# Patient Record
Sex: FEMALE | Employment: OTHER | URBAN - METROPOLITAN AREA
[De-identification: names, ages, dates, MRNs, and addresses within clinical notes are randomized per-mention and may not be internally consistent; named-entity substitution may affect disease eponyms.]

---

## 2017-09-13 PROBLEM — R56.9 SEIZURE (HCC): Status: ACTIVE | Noted: 2017-09-13

## 2017-10-03 ENCOUNTER — APPOINTMENT (OUTPATIENT)
Dept: LAB | Age: 77
End: 2017-10-03
Attending: INTERNAL MEDICINE

## 2017-10-03 DIAGNOSIS — R56.9 SEIZURE (HCC): ICD-10-CM

## 2017-10-03 PROCEDURE — 80185 ASSAY OF PHENYTOIN TOTAL: CPT

## 2017-10-03 PROCEDURE — 36415 COLL VENOUS BLD VENIPUNCTURE: CPT

## 2017-10-10 ENCOUNTER — APPOINTMENT (OUTPATIENT)
Dept: LAB | Age: 77
End: 2017-10-10
Attending: INTERNAL MEDICINE

## 2017-10-10 DIAGNOSIS — R56.9 SEIZURE (HCC): ICD-10-CM

## 2017-10-10 PROCEDURE — 80185 ASSAY OF PHENYTOIN TOTAL: CPT

## 2017-10-10 PROCEDURE — 36415 COLL VENOUS BLD VENIPUNCTURE: CPT

## 2017-10-17 ENCOUNTER — APPOINTMENT (OUTPATIENT)
Dept: LAB | Age: 77
End: 2017-10-17
Attending: INTERNAL MEDICINE

## 2017-10-17 DIAGNOSIS — R56.9 SEIZURE (HCC): ICD-10-CM

## 2017-10-17 PROCEDURE — 80185 ASSAY OF PHENYTOIN TOTAL: CPT

## 2017-10-17 PROCEDURE — 36415 COLL VENOUS BLD VENIPUNCTURE: CPT

## 2017-10-24 ENCOUNTER — APPOINTMENT (OUTPATIENT)
Dept: LAB | Age: 77
End: 2017-10-24
Attending: INTERNAL MEDICINE

## 2017-10-24 DIAGNOSIS — R56.9 SEIZURE (HCC): ICD-10-CM

## 2017-10-24 PROCEDURE — 36415 COLL VENOUS BLD VENIPUNCTURE: CPT

## 2017-10-24 PROCEDURE — 80185 ASSAY OF PHENYTOIN TOTAL: CPT

## 2017-11-07 ENCOUNTER — APPOINTMENT (OUTPATIENT)
Dept: LAB | Age: 77
End: 2017-11-07
Attending: INTERNAL MEDICINE

## 2017-11-07 DIAGNOSIS — E55.9 VITAMIN D DEFICIENCY: ICD-10-CM

## 2017-11-07 DIAGNOSIS — R56.9 SEIZURE (HCC): ICD-10-CM

## 2017-11-07 PROCEDURE — 36415 COLL VENOUS BLD VENIPUNCTURE: CPT

## 2017-11-07 PROCEDURE — 80185 ASSAY OF PHENYTOIN TOTAL: CPT

## 2017-11-07 PROCEDURE — 82306 VITAMIN D 25 HYDROXY: CPT

## 2017-12-05 ENCOUNTER — APPOINTMENT (OUTPATIENT)
Dept: LAB | Age: 77
End: 2017-12-05
Attending: INTERNAL MEDICINE

## 2017-12-05 DIAGNOSIS — R56.9 SEIZURE (HCC): ICD-10-CM

## 2017-12-05 PROCEDURE — 80185 ASSAY OF PHENYTOIN TOTAL: CPT

## 2017-12-05 PROCEDURE — 36415 COLL VENOUS BLD VENIPUNCTURE: CPT

## 2018-01-02 ENCOUNTER — APPOINTMENT (OUTPATIENT)
Dept: LAB | Age: 78
End: 2018-01-02
Attending: INTERNAL MEDICINE

## 2018-01-02 DIAGNOSIS — R56.9 SEIZURE (HCC): ICD-10-CM

## 2018-01-02 LAB — PHENYTOIN (DILANTIN): 17.3 UG/ML (ref 10–20)

## 2018-01-02 PROCEDURE — 36415 COLL VENOUS BLD VENIPUNCTURE: CPT

## 2018-01-02 PROCEDURE — 80185 ASSAY OF PHENYTOIN TOTAL: CPT

## 2018-01-23 ENCOUNTER — NURSE ONLY (OUTPATIENT)
Dept: LAB | Age: 78
End: 2018-01-23
Attending: INTERNAL MEDICINE

## 2018-01-23 DIAGNOSIS — N18.9 CHRONIC KIDNEY DISEASE, UNSPECIFIED CKD STAGE: ICD-10-CM

## 2018-01-23 DIAGNOSIS — R56.9 SEIZURE (HCC): ICD-10-CM

## 2018-01-23 DIAGNOSIS — I10 HYPERTENSION, UNSPECIFIED TYPE: ICD-10-CM

## 2018-01-23 LAB
BASOPHILS # BLD AUTO: 0.08 X10(3) UL (ref 0–0.1)
BASOPHILS NFR BLD AUTO: 1.1 %
BUN BLD-MCNC: 14 MG/DL (ref 8–20)
CALCIUM BLD-MCNC: 8.8 MG/DL (ref 8.3–10.3)
CHLORIDE: 109 MMOL/L (ref 101–111)
CO2: 26 MMOL/L (ref 22–32)
CREAT BLD-MCNC: 0.71 MG/DL (ref 0.55–1.02)
EOSINOPHIL # BLD AUTO: 0.51 X10(3) UL (ref 0–0.3)
EOSINOPHIL NFR BLD AUTO: 7.1 %
ERYTHROCYTE [DISTWIDTH] IN BLOOD BY AUTOMATED COUNT: 13.3 % (ref 11.5–16)
GLUCOSE BLD-MCNC: 85 MG/DL (ref 70–99)
HCT VFR BLD AUTO: 35.6 % (ref 34–50)
HGB BLD-MCNC: 11.7 G/DL (ref 12–16)
IMMATURE GRANULOCYTE COUNT: 0.02 X10(3) UL (ref 0–1)
IMMATURE GRANULOCYTE RATIO %: 0.3 %
LYMPHOCYTES # BLD AUTO: 2.54 X10(3) UL (ref 0.9–4)
LYMPHOCYTES NFR BLD AUTO: 35.6 %
MCH RBC QN AUTO: 29.3 PG (ref 27–33.2)
MCHC RBC AUTO-ENTMCNC: 32.9 G/DL (ref 31–37)
MCV RBC AUTO: 89 FL (ref 81–100)
MONOCYTES # BLD AUTO: 0.66 X10(3) UL (ref 0.1–0.6)
MONOCYTES NFR BLD AUTO: 9.2 %
NEUTROPHIL ABS PRELIM: 3.33 X10 (3) UL (ref 1.3–6.7)
NEUTROPHILS # BLD AUTO: 3.33 X10(3) UL (ref 1.3–6.7)
NEUTROPHILS NFR BLD AUTO: 46.7 %
PLATELET # BLD AUTO: 180 10(3)UL (ref 150–450)
POTASSIUM SERPL-SCNC: 4 MMOL/L (ref 3.6–5.1)
RBC # BLD AUTO: 4 X10(6)UL (ref 3.8–5.1)
RED CELL DISTRIBUTION WIDTH-SD: 43.1 FL (ref 35.1–46.3)
SODIUM SERPL-SCNC: 143 MMOL/L (ref 136–144)
TSI SER-ACNC: 0.58 MIU/ML (ref 0.35–5.5)
WBC # BLD AUTO: 7.1 X10(3) UL (ref 4–13)

## 2018-01-23 PROCEDURE — 84443 ASSAY THYROID STIM HORMONE: CPT

## 2018-01-23 PROCEDURE — 85025 COMPLETE CBC W/AUTO DIFF WBC: CPT

## 2018-01-23 PROCEDURE — 80048 BASIC METABOLIC PNL TOTAL CA: CPT

## 2018-01-23 PROCEDURE — 36415 COLL VENOUS BLD VENIPUNCTURE: CPT

## 2018-01-30 ENCOUNTER — APPOINTMENT (OUTPATIENT)
Dept: LAB | Age: 78
End: 2018-01-30
Attending: INTERNAL MEDICINE

## 2018-01-30 DIAGNOSIS — Z51.81 ENCOUNTER FOR MEDICATION MONITORING: ICD-10-CM

## 2018-01-30 DIAGNOSIS — R56.9 SEIZURE (HCC): ICD-10-CM

## 2018-01-30 LAB — PHENYTOIN (DILANTIN): 13.7 UG/ML (ref 10–20)

## 2018-01-30 PROCEDURE — 36415 COLL VENOUS BLD VENIPUNCTURE: CPT

## 2018-01-30 PROCEDURE — 80185 ASSAY OF PHENYTOIN TOTAL: CPT

## 2018-03-06 ENCOUNTER — APPOINTMENT (OUTPATIENT)
Dept: LAB | Age: 78
End: 2018-03-06
Attending: INTERNAL MEDICINE

## 2018-03-06 DIAGNOSIS — R56.9 SEIZURE (HCC): ICD-10-CM

## 2018-03-06 PROCEDURE — 80185 ASSAY OF PHENYTOIN TOTAL: CPT

## 2018-03-07 LAB — PHENYTOIN (DILANTIN): 14.8 UG/ML (ref 10–20)

## 2018-04-26 ENCOUNTER — NURSE ONLY (OUTPATIENT)
Dept: LAB | Age: 78
End: 2018-04-26
Attending: INTERNAL MEDICINE

## 2018-04-26 DIAGNOSIS — R56.9 SEIZURE (HCC): ICD-10-CM

## 2018-04-26 PROCEDURE — 80048 BASIC METABOLIC PNL TOTAL CA: CPT

## 2018-04-26 PROCEDURE — 85025 COMPLETE CBC W/AUTO DIFF WBC: CPT

## 2018-04-26 PROCEDURE — 36415 COLL VENOUS BLD VENIPUNCTURE: CPT

## 2018-06-05 ENCOUNTER — APPOINTMENT (OUTPATIENT)
Dept: LAB | Age: 78
End: 2018-06-05
Attending: INTERNAL MEDICINE

## 2018-06-05 DIAGNOSIS — R56.9 SEIZURE (HCC): ICD-10-CM

## 2018-06-05 PROCEDURE — 80185 ASSAY OF PHENYTOIN TOTAL: CPT

## 2018-06-05 PROCEDURE — 36415 COLL VENOUS BLD VENIPUNCTURE: CPT

## 2018-06-19 ENCOUNTER — APPOINTMENT (OUTPATIENT)
Dept: CT IMAGING | Facility: HOSPITAL | Age: 78
DRG: 101 | End: 2018-06-19
Attending: EMERGENCY MEDICINE
Payer: MEDICARE

## 2018-06-19 ENCOUNTER — HOSPITAL ENCOUNTER (INPATIENT)
Facility: HOSPITAL | Age: 78
LOS: 2 days | Discharge: HOME | DRG: 101 | End: 2018-06-21
Attending: EMERGENCY MEDICINE | Admitting: INTERNAL MEDICINE
Payer: MEDICARE

## 2018-06-19 DIAGNOSIS — G40.909 SEIZURE DISORDER (HCC): Primary | ICD-10-CM

## 2018-06-19 DIAGNOSIS — R56.9 SEIZURE (HCC): ICD-10-CM

## 2018-06-19 PROCEDURE — 96376 TX/PRO/DX INJ SAME DRUG ADON: CPT

## 2018-06-19 PROCEDURE — 99285 EMERGENCY DEPT VISIT HI MDM: CPT

## 2018-06-19 PROCEDURE — 85025 COMPLETE CBC W/AUTO DIFF WBC: CPT | Performed by: EMERGENCY MEDICINE

## 2018-06-19 PROCEDURE — 87086 URINE CULTURE/COLONY COUNT: CPT | Performed by: EMERGENCY MEDICINE

## 2018-06-19 PROCEDURE — 70450 CT HEAD/BRAIN W/O DYE: CPT | Performed by: EMERGENCY MEDICINE

## 2018-06-19 PROCEDURE — 87081 CULTURE SCREEN ONLY: CPT | Performed by: INTERNAL MEDICINE

## 2018-06-19 PROCEDURE — 93005 ELECTROCARDIOGRAM TRACING: CPT

## 2018-06-19 PROCEDURE — 96375 TX/PRO/DX INJ NEW DRUG ADDON: CPT

## 2018-06-19 PROCEDURE — 86225 DNA ANTIBODY NATIVE: CPT | Performed by: OTHER

## 2018-06-19 PROCEDURE — 86235 NUCLEAR ANTIGEN ANTIBODY: CPT | Performed by: OTHER

## 2018-06-19 PROCEDURE — 86038 ANTINUCLEAR ANTIBODIES: CPT | Performed by: OTHER

## 2018-06-19 PROCEDURE — 81001 URINALYSIS AUTO W/SCOPE: CPT | Performed by: EMERGENCY MEDICINE

## 2018-06-19 PROCEDURE — 80185 ASSAY OF PHENYTOIN TOTAL: CPT | Performed by: EMERGENCY MEDICINE

## 2018-06-19 PROCEDURE — 96374 THER/PROPH/DIAG INJ IV PUSH: CPT

## 2018-06-19 PROCEDURE — 84484 ASSAY OF TROPONIN QUANT: CPT | Performed by: EMERGENCY MEDICINE

## 2018-06-19 PROCEDURE — 93010 ELECTROCARDIOGRAM REPORT: CPT

## 2018-06-19 PROCEDURE — C9254 INJECTION, LACOSAMIDE: HCPCS | Performed by: EMERGENCY MEDICINE

## 2018-06-19 PROCEDURE — 80053 COMPREHEN METABOLIC PANEL: CPT | Performed by: EMERGENCY MEDICINE

## 2018-06-19 RX ORDER — DIPHENHYDRAMINE HCL 25 MG
25 CAPSULE ORAL EVERY 6 HOURS PRN
Status: DISCONTINUED | OUTPATIENT
Start: 2018-06-19 | End: 2018-06-21

## 2018-06-19 RX ORDER — PHENYTOIN SODIUM 100 MG/1
200 CAPSULE, EXTENDED RELEASE ORAL DAILY
Status: DISCONTINUED | OUTPATIENT
Start: 2018-06-19 | End: 2018-06-20

## 2018-06-19 RX ORDER — ATORVASTATIN CALCIUM 20 MG/1
20 TABLET, FILM COATED ORAL NIGHTLY
Status: DISCONTINUED | OUTPATIENT
Start: 2018-06-19 | End: 2018-06-21

## 2018-06-19 RX ORDER — CETIRIZINE HYDROCHLORIDE 10 MG/1
10 TABLET ORAL DAILY
Status: DISCONTINUED | OUTPATIENT
Start: 2018-06-19 | End: 2018-06-21

## 2018-06-19 RX ORDER — PHENYTOIN SODIUM 100 MG/1
100 CAPSULE, EXTENDED RELEASE ORAL EVERY OTHER DAY
Status: DISCONTINUED | OUTPATIENT
Start: 2018-06-20 | End: 2018-06-20 | Stop reason: SDUPTHER

## 2018-06-19 RX ORDER — CLONAZEPAM 1 MG/1
1 TABLET ORAL 2 TIMES DAILY PRN
Status: DISCONTINUED | OUTPATIENT
Start: 2018-06-19 | End: 2018-06-21

## 2018-06-19 RX ORDER — GABAPENTIN 300 MG/1
300 CAPSULE ORAL 2 TIMES DAILY
Status: DISCONTINUED | OUTPATIENT
Start: 2018-06-19 | End: 2018-06-21

## 2018-06-19 RX ORDER — PHENYTOIN SODIUM 100 MG/1
200 CAPSULE, EXTENDED RELEASE ORAL EVERY OTHER DAY
Status: DISCONTINUED | OUTPATIENT
Start: 2018-06-20 | End: 2018-06-20 | Stop reason: SDUPTHER

## 2018-06-19 RX ORDER — PANTOPRAZOLE SODIUM 40 MG/1
40 TABLET, DELAYED RELEASE ORAL
Status: DISCONTINUED | OUTPATIENT
Start: 2018-06-20 | End: 2018-06-21

## 2018-06-19 RX ORDER — SODIUM CHLORIDE 9 MG/ML
INJECTION, SOLUTION INTRAVENOUS CONTINUOUS
Status: DISCONTINUED | OUTPATIENT
Start: 2018-06-19 | End: 2018-06-21

## 2018-06-19 RX ORDER — PHENYTOIN 50 MG/1
50 TABLET, CHEWABLE ORAL EVERY OTHER DAY
Status: DISCONTINUED | OUTPATIENT
Start: 2018-06-20 | End: 2018-06-20 | Stop reason: SDUPTHER

## 2018-06-19 RX ORDER — ASPIRIN 81 MG/1
81 TABLET, CHEWABLE ORAL DAILY
Status: DISCONTINUED | OUTPATIENT
Start: 2018-06-20 | End: 2018-06-21

## 2018-06-19 RX ORDER — SODIUM CHLORIDE 9 MG/ML
1000 INJECTION, SOLUTION INTRAVENOUS ONCE
Status: COMPLETED | OUTPATIENT
Start: 2018-06-19 | End: 2018-06-19

## 2018-06-19 RX ORDER — LORAZEPAM 2 MG/ML
0.5 INJECTION INTRAMUSCULAR ONCE
Status: COMPLETED | OUTPATIENT
Start: 2018-06-19 | End: 2018-06-19

## 2018-06-19 RX ORDER — BUMETANIDE 1 MG/1
0.5 TABLET ORAL DAILY
Status: DISCONTINUED | OUTPATIENT
Start: 2018-06-19 | End: 2018-06-21

## 2018-06-19 NOTE — ED INITIAL ASSESSMENT (HPI)
Pt aox4. Pt presents to ed from [facility] per EMS. Pt states has had several focal seizures x 2 days. Pt states this is normal for her. Pt c/o generalized weakness, and chest pain after seizure today.

## 2018-06-19 NOTE — ED PROVIDER NOTES
Patient Seen in: [facility] Emergency Department    History   Patient presents with:  Seizure Disorder (neurologic)  Numbness Weakness (neurologic)    Stated Complaint: focal seizure and generalized weakness    HPI    Patient is a 61-year-old female (36.2 °C) (Temporal)   Resp 17   Ht 167.6 cm (5' 6\")   Wt 80 kg   SpO2 97%   BMI 28.47 kg/m²         Physical Exam   Constitutional: She is oriented to person, place, and time. She appears well-developed and well-nourished.    HENT:   Head: Normocephalic a 6/19/2018  CONCLUSION:  No acute intracranial process. Dictated by: Elida Murrieta MD on 6/19/2018 at 19:49     Approved by: Elida Murrieta MD            EKG    Rate, intervals and axes as noted on EKG Report.   Rate: 82  Rhythm: Sinus Rhythm  Reading: No In the room she is sort of looking around, fidgeting and occasionally looking at her left hand. She does have some of her responses, 1-2 words, some are appropriate but some are not. No evidence of facial droop, dysarthria.   As above, symptoms are very

## 2018-06-19 NOTE — ED NOTES
Pt aox4. Pt answering questions appropriately. Pt continuously sighing. Pt states \" everything is hurting me right now. \" Pt unable to describe her complaints. Continuing to monitor pt.  Pt ready for ct scan

## 2018-06-19 NOTE — ED NOTES
Patient began staring, nonverbal, pt following commands, moving all extremities, and reactive to painful stimuli. / Pao Messer notified and at bedside to evaluate pt. Pt placed on monitor and pulse ox.

## 2018-06-20 ENCOUNTER — APPOINTMENT (OUTPATIENT)
Dept: GENERAL RADIOLOGY | Facility: HOSPITAL | Age: 78
DRG: 101 | End: 2018-06-20
Attending: Other
Payer: MEDICARE

## 2018-06-20 PROCEDURE — 83735 ASSAY OF MAGNESIUM: CPT | Performed by: INTERNAL MEDICINE

## 2018-06-20 PROCEDURE — 97161 PT EVAL LOW COMPLEX 20 MIN: CPT

## 2018-06-20 PROCEDURE — 95819 EEG AWAKE AND ASLEEP: CPT

## 2018-06-20 PROCEDURE — 85652 RBC SED RATE AUTOMATED: CPT | Performed by: OTHER

## 2018-06-20 PROCEDURE — 84443 ASSAY THYROID STIM HORMONE: CPT | Performed by: INTERNAL MEDICINE

## 2018-06-20 PROCEDURE — 97165 OT EVAL LOW COMPLEX 30 MIN: CPT

## 2018-06-20 PROCEDURE — 82607 VITAMIN B-12: CPT | Performed by: OTHER

## 2018-06-20 PROCEDURE — 71046 X-RAY EXAM CHEST 2 VIEWS: CPT | Performed by: OTHER

## 2018-06-20 PROCEDURE — 85025 COMPLETE CBC W/AUTO DIFF WBC: CPT | Performed by: INTERNAL MEDICINE

## 2018-06-20 PROCEDURE — 84425 ASSAY OF VITAMIN B-1: CPT | Performed by: OTHER

## 2018-06-20 PROCEDURE — 82746 ASSAY OF FOLIC ACID SERUM: CPT | Performed by: OTHER

## 2018-06-20 RX ORDER — PHENYTOIN 50 MG/1
50 TABLET, CHEWABLE ORAL
Status: DISCONTINUED | OUTPATIENT
Start: 2018-06-21 | End: 2018-06-20

## 2018-06-20 RX ORDER — PHENYTOIN SODIUM 100 MG/1
100 CAPSULE, EXTENDED RELEASE ORAL EVERY OTHER DAY
COMMUNITY
End: 2018-10-15

## 2018-06-20 RX ORDER — PHENYTOIN SODIUM 200 MG/1
400 CAPSULE, EXTENDED RELEASE ORAL EVERY OTHER DAY
Status: ON HOLD | COMMUNITY
End: 2018-06-20

## 2018-06-20 RX ORDER — PHENYTOIN SODIUM 100 MG/1
100 CAPSULE, EXTENDED RELEASE ORAL EVERY OTHER DAY
Status: DISCONTINUED | OUTPATIENT
Start: 2018-06-21 | End: 2018-06-21

## 2018-06-20 RX ORDER — PHENYTOIN SODIUM 100 MG/1
350 CAPSULE, EXTENDED RELEASE ORAL EVERY OTHER DAY
Status: ON HOLD | COMMUNITY
End: 2018-06-20

## 2018-06-20 RX ORDER — PHENYTOIN SODIUM 100 MG/1
400 CAPSULE, EXTENDED RELEASE ORAL
Status: DISCONTINUED | OUTPATIENT
Start: 2018-06-22 | End: 2018-06-20

## 2018-06-20 RX ORDER — LORATADINE 10 MG/1
10 TABLET ORAL DAILY PRN
Status: ON HOLD | COMMUNITY
End: 2018-11-17

## 2018-06-20 RX ORDER — ERGOCALCIFEROL (VITAMIN D2) 1250 MCG
50000 CAPSULE ORAL WEEKLY
COMMUNITY
End: 2019-09-12

## 2018-06-20 RX ORDER — PHENYTOIN 50 MG/1
50 TABLET, CHEWABLE ORAL EVERY OTHER DAY
COMMUNITY
End: 2018-10-15

## 2018-06-20 RX ORDER — PHENYTOIN SODIUM 100 MG/1
200 CAPSULE, EXTENDED RELEASE ORAL EVERY OTHER DAY
Status: DISCONTINUED | OUTPATIENT
Start: 2018-06-22 | End: 2018-06-21

## 2018-06-20 RX ORDER — PHENYTOIN SODIUM 100 MG/1
300 CAPSULE, EXTENDED RELEASE ORAL
Status: DISCONTINUED | OUTPATIENT
Start: 2018-06-21 | End: 2018-06-20

## 2018-06-20 RX ORDER — MULTIVIT-MIN/FOLIC ACID/LUTEIN 500-250MCG
1 TABLET,CHEWABLE ORAL DAILY
COMMUNITY
End: 2019-05-15

## 2018-06-20 RX ORDER — PHENYTOIN SODIUM 200 MG/1
200 CAPSULE, EXTENDED RELEASE ORAL EVERY OTHER DAY
COMMUNITY
End: 2018-10-15

## 2018-06-20 RX ORDER — MELATONIN
1000 DAILY
COMMUNITY
End: 2019-05-15

## 2018-06-20 RX ORDER — PHENYTOIN SODIUM 200 MG/1
200 CAPSULE, EXTENDED RELEASE ORAL NIGHTLY
COMMUNITY
End: 2018-09-04

## 2018-06-20 RX ORDER — PHENYTOIN 50 MG/1
50 TABLET, CHEWABLE ORAL EVERY OTHER DAY
Status: DISCONTINUED | OUTPATIENT
Start: 2018-06-21 | End: 2018-06-21

## 2018-06-20 RX ORDER — PHENYTOIN SODIUM 100 MG/1
200 CAPSULE, EXTENDED RELEASE ORAL NIGHTLY
Status: DISCONTINUED | OUTPATIENT
Start: 2018-06-21 | End: 2018-06-21

## 2018-06-20 RX ORDER — CLONAZEPAM 1 MG/1
1 TABLET ORAL NIGHTLY
COMMUNITY
End: 2019-07-16

## 2018-06-20 NOTE — PLAN OF CARE
Problem: Patient/Family Goals  Goal: Patient/Family Long Term Goal  Patient's Long Term Goal: return to [facility]. Interventions:  -monitor for seizure activity and give medications as ordered.   - See additional Care Plan goals for specific i

## 2018-06-20 NOTE — CM/SW NOTE
Order to discuss home health/post d/c needs with patient. MSW spoke to patient, she has hx of [facility] and declines need for it at this time.

## 2018-06-20 NOTE — OCCUPATIONAL THERAPY NOTE
OCCUPATIONAL THERAPY QUICK EVALUATION - INPATIENT    Room Number: 5487/3110-M  Evaluation Date: 6/20/2018     Type of Evaluation: Quick Eval  Presenting Problem: possible seizure    Physician Order: IP Consult to Occupational Therapy  Reason for Therapy: help from another person does the patient currently need…  -   Putting on and taking off regular lower body clothing?: None   -   Bathing (including washing, rinsing, drying)?: None  -   Toileting, which includes using toilet, bedpan or urinal? : None  - from Occupational Therapy services. Please re-order if a new functional limitation presents during this admission.     Patient was able to achieve the following goals:  Patient able to toilet transfer: safely and independently  Patient able to dress lower

## 2018-06-20 NOTE — PLAN OF CARE
Problem: Patient/Family Goals  Goal: Patient/Family Short Term Goal  Patient's Short Term Goal:no seizure activity    Interventions:   - medications as ordered.    - See additional Care Plan goals for specific interventions   Outcome: Progressing  No seizur

## 2018-06-20 NOTE — H&P
Amanda U. [address]. Patient Status:  Inpatient    1940 MRN [id number]   [facility] 3NE-A Attending Ye Buckner MD   [address] Day # 1 PCP None Pcp     History of Present Illness:  Ayla Gallo is a(n) (two) times daily. Disp:  Rfl:  6/19/2018 at 0800   Levothyroxine Sodium 200 MCG Oral Tab Take 200 mcg by mouth before breakfast. Disp:  Rfl:  6/19/2018 at 0800   metoprolol Tartrate 25 MG Oral Tab Take 25 mg by mouth 2 (two) times daily.  Disp:  Rfl:  6/19 focal neurological deficits. Musculoskeletal: Full range of motion of all extremities. No swelling noted. Integument: No lesions. No erythema. Psychiatric: Appropriate mood and affect.     Laboratory Data:     Lab Results  Component Value Date   WBC 8.1 MD on 6/19/2018 at 19:49     Approved by: Ryland Cloud MD               Assessment & Plan:   Seizure disorder [facility])  On Dilatin , started on Lacosamide by neurology.  Tolerating well  EEG  And MRI of brain pending   CAD - Asymptomatic  Hypothyroidism well

## 2018-06-20 NOTE — CONSULTS
Matt 2  Neurology  Hospital Consultation Report    Nicho Olson Patient Status:  Observation    1940 MRN [id number]   [facility] 3NE-A Attending Rosalita Mohs, MD   Hosp Day # 0 PCP None Pcp   Date of Admission:   history. Family History  History reviewed. No pertinent family history.   Social History  Smoking status: Never Smoker                                                              Smokeless tobacco: Never Used                             Current Medication daily.   Omega-3 1000 MG Oral Cap Take by mouth.   ergocalciferol 01851 units Oral Cap Take by mouth every 7 days. Dexlansoprazole 60 MG Oral Capsule Delayed Release Take 60 mg by mouth daily.    phenytoin 50 MG Oral Chew Tab Chew 1 tablet (50 mg total) b 28.47 kg/m²    General: The patient appears attentive, bright and well composed. Head: Normocephalic, atraumatic. Eyes: anicteric  ENT: normal tongue, normal mucosa.    Neck: supple, normal range of motion, no cervical bruit  Lymph Nodes:  No adenopathy Sanjuanita Bui MD on 6/19/2018 at 19:49     Approved by: Sanjuanita Bui MD            Pertinent Labs and Imaging: Reviewed. Impression:     Seizure disorder (HCC)  Phenytoin and  lacosamide will be continued. EEG testing to follow.   MRI brain looking for

## 2018-06-20 NOTE — ED NOTES
RN called to bedside by pt daughter who states patient is having a seizure. RN witnessed patient looking for items in her bed. Pt not answering questions appropriately and looking around the room. Dr. Lyndon Paulino at bedside, orders received.

## 2018-06-20 NOTE — PROCEDURES
ELECTROENCEPHALOGRAM REPORT      Patient Name: Judi Miller  Chart ID: [id number]  Ordering Physician: Bere Bueno M.D. Date of Test: June 20, 2018  Patient Diagnosis: focal seizures    A portable bedsideEEG was obtained. No sedation was given.

## 2018-06-20 NOTE — CM/SW NOTE
06/20/18 1000   CM/SW Referral Data   Referral Source Social Work (self-referral)   Reason for Referral Discharge planning   Informant Patient   Patient Info   Patient's Mental Status Alert;Oriented   Patient's [address]

## 2018-06-20 NOTE — PROGRESS NOTES
06/20/18 0830   Clinical Encounter Type   Visited With Patient   Routine Visit Introduction   Continue Visiting No   Yarsanism Encounters   Spiritual Requests During Visit / Hospitalization 9[address] Leilani Johnson Patient

## 2018-06-20 NOTE — PHYSICAL THERAPY NOTE
PHYSICAL THERAPY QUICK EVALUATION - INPATIENT    Room Number: 3391/1945-S  Evaluation Date: 6/20/2018  Presenting Problem: seizures  Physician Order: PT Eval and Treat    Problem List  Principal Problem:    Seizure disorder [facility])      Past Medical Histor AM-PAC Score:  Raw Score: 22   PT Approx Degree of Impairment Score: 20.91%   Standardized Score (AM-PAC Scale): 53.28   CMS Modifier (G-Code): CJ      FUNCTIONAL ABILITY STATUS  Gait Assessment  Gait Assistance: Supervision  Distance (ft): 150  Assi

## 2018-06-21 ENCOUNTER — APPOINTMENT (OUTPATIENT)
Dept: MRI IMAGING | Facility: HOSPITAL | Age: 78
DRG: 101 | End: 2018-06-21
Attending: Other
Payer: MEDICARE

## 2018-06-21 VITALS
DIASTOLIC BLOOD PRESSURE: 65 MMHG | WEIGHT: 176.38 LBS | HEART RATE: 70 BPM | HEIGHT: 66 IN | TEMPERATURE: 98 F | OXYGEN SATURATION: 98 % | RESPIRATION RATE: 18 BRPM | BODY MASS INDEX: 28.34 KG/M2 | SYSTOLIC BLOOD PRESSURE: 126 MMHG

## 2018-06-21 PROCEDURE — A9576 INJ PROHANCE MULTIPACK: HCPCS | Performed by: INTERNAL MEDICINE

## 2018-06-21 PROCEDURE — 82962 GLUCOSE BLOOD TEST: CPT

## 2018-06-21 PROCEDURE — 70553 MRI BRAIN STEM W/O & W/DYE: CPT | Performed by: OTHER

## 2018-06-21 RX ORDER — CETIRIZINE HYDROCHLORIDE 10 MG/1
10 TABLET ORAL DAILY
Qty: 30 TABLET | Refills: 0 | Status: SHIPPED | OUTPATIENT
Start: 2018-06-22

## 2018-06-21 NOTE — PROGRESS NOTES
[facility]  Neurology  [facility] Consultation Report    Nicho Olson Patient Status:  Inpatient    1940 MRN [id number]   [facility] 3NE-A Attending Rosalita Mohs, MD   [facility] Day # 2 PCP None Pcp   Date of Admission:   Medications:  Phenytoin Sodium Extended (DILANTIN) ER cap 200 mg 200 mg Oral Nightly   [START ON 6/22/2018] Phenytoin Sodium Extended (DILANTIN) ER cap 200 mg 200 mg Oral QOD   Phenytoin Sodium Extended (DILANTIN) ER cap 100 mg 100 mg Oral QOD   And      p Chewable Tabs). Phenytoin Sodium Extended 100 MG Oral Cap Take 100 mg by mouth every other day. Takes Phenytoin  mg capsule with Phenytoin 50 mg chewable tablet to equal dose of Phenytoin 150 mg every other morning.  Alternating with Phenytoin ER 2 exertion, coughing or wheezing  CARDIOVASCULAR: denies chest pain on exertion; no palpitations  GI: denies abdominal pain, denies heartburn, denies vomiting, constipation,diarrhea,nausea; no rectal bleeding  : no complaint of urinary incontinence  PSYCH: 06/19/2018   CO2 27.0 06/19/2018    (H) 06/19/2018   CA 9.2 06/19/2018   ALB 3.7 06/19/2018   ALKPHO 119 06/19/2018   TP 7.7 06/19/2018   AST 24 06/19/2018   ALT 25 06/19/2018   TSH 0.752 06/20/2018   ESRML 18 06/20/2018   MG 2.3 06/20/2018   TROP < 100 mg by mouth twice a day #60, 3 refills to the patient's pharmacy.   Imani Whitlock MD  6/21/18  15:59

## 2018-06-21 NOTE — PLAN OF CARE
Impaired Functional Mobility    • Achieve highest/safest level of mobility/gait Progressing        NEUROLOGICAL - ADULT    • Absence of seizures Progressing    • Remains free of injury related to seizure activity Progressing        Patient/Family Goals

## 2018-06-21 NOTE — PROGRESS NOTES
Patient discharged to [facility] assist living daughter here to take her home. Explained all medications and follow up appts. Patient and her daughter verbalized there understanding of teaching.

## 2018-07-16 NOTE — DISCHARGE SUMMARY
[facility]  Discharge Summary    Yusuf Perera Patient Status:  Inpatient    1940 MRN [id number]   [facility] 3NE-A Attending No att. providers found   Hosp Day # 2 PCP None Pcp     Date of Admission: 2018    Date of Dischar Historical    !! Phenytoin Sodium Extended 200 MG Oral Cap  Take 200 mg by mouth every other day. Takes Phenytoin  mg every other morning. Alternating with Phenytoin 150mg every other morning (Phenytoin ER 100mg + Phenytoin 50mg Chewable Tabs).  , His Tab          Follow up Visits: Follow-up with Physicians as directed.         [facility]  7/16/2018  6:35 PM

## 2018-08-02 ENCOUNTER — NURSE ONLY (OUTPATIENT)
Dept: LAB | Age: 78
End: 2018-08-02
Attending: INTERNAL MEDICINE
Payer: COMMERCIAL

## 2018-08-02 DIAGNOSIS — R56.9 SEIZURE (HCC): ICD-10-CM

## 2018-08-02 LAB
ALBUMIN SERPL-MCNC: 3.1 G/DL (ref 3.5–4.8)
ALBUMIN/GLOB SERPL: 1 {RATIO} (ref 1–2)
ALP LIVER SERPL-CCNC: 94 U/L (ref 55–142)
ALT SERPL-CCNC: 18 U/L (ref 14–54)
ANION GAP SERPL CALC-SCNC: 5 MMOL/L (ref 0–18)
AST SERPL-CCNC: 15 U/L (ref 15–41)
BASOPHILS # BLD AUTO: 0.08 X10(3) UL (ref 0–0.1)
BASOPHILS NFR BLD AUTO: 1.2 %
BILIRUB SERPL-MCNC: 0.2 MG/DL (ref 0.1–2)
BUN BLD-MCNC: 14 MG/DL (ref 8–20)
BUN/CREAT SERPL: 22.6 (ref 10–20)
CALCIUM BLD-MCNC: 8.4 MG/DL (ref 8.3–10.3)
CHLORIDE SERPL-SCNC: 110 MMOL/L (ref 101–111)
CHOLEST SMN-MCNC: 179 MG/DL (ref ?–200)
CO2 SERPL-SCNC: 28 MMOL/L (ref 22–32)
CREAT BLD-MCNC: 0.62 MG/DL (ref 0.55–1.02)
EOSINOPHIL # BLD AUTO: 0.42 X10(3) UL (ref 0–0.3)
EOSINOPHIL NFR BLD AUTO: 6.5 %
ERYTHROCYTE [DISTWIDTH] IN BLOOD BY AUTOMATED COUNT: 13.6 % (ref 11.5–16)
EST. AVERAGE GLUCOSE BLD GHB EST-MCNC: 123 MG/DL (ref 68–126)
GLOBULIN PLAS-MCNC: 3.2 G/DL (ref 2.5–3.7)
GLUCOSE BLD-MCNC: 83 MG/DL (ref 70–99)
HBA1C MFR BLD HPLC: 5.9 % (ref ?–5.7)
HCT VFR BLD AUTO: 35.3 % (ref 34–50)
HDLC SERPL-MCNC: 87 MG/DL (ref 40–59)
HGB BLD-MCNC: 11.2 G/DL (ref 12–16)
IMMATURE GRANULOCYTE COUNT: 0.02 X10(3) UL (ref 0–1)
IMMATURE GRANULOCYTE RATIO %: 0.3 %
LDLC SERPL CALC-MCNC: 83 MG/DL (ref ?–100)
LYMPHOCYTES # BLD AUTO: 1.75 X10(3) UL (ref 0.9–4)
LYMPHOCYTES NFR BLD AUTO: 27.2 %
M PROTEIN MFR SERPL ELPH: 6.3 G/DL (ref 6.1–8.3)
MCH RBC QN AUTO: 28.5 PG (ref 27–33.2)
MCHC RBC AUTO-ENTMCNC: 31.7 G/DL (ref 31–37)
MCV RBC AUTO: 89.8 FL (ref 81–100)
MONOCYTES # BLD AUTO: 0.57 X10(3) UL (ref 0.1–1)
MONOCYTES NFR BLD AUTO: 8.9 %
NEUTROPHIL ABS PRELIM: 3.59 X10 (3) UL (ref 1.3–6.7)
NEUTROPHILS # BLD AUTO: 3.59 X10(3) UL (ref 1.3–6.7)
NEUTROPHILS NFR BLD AUTO: 55.9 %
NONHDLC SERPL-MCNC: 92 MG/DL (ref ?–130)
OSMOLALITY SERPL CALC.SUM OF ELEC: 296 MOSM/KG (ref 275–295)
PHENYTOIN (DILANTIN): 18.6 UG/ML (ref 10–20)
PLATELET # BLD AUTO: 154 10(3)UL (ref 150–450)
POTASSIUM SERPL-SCNC: 4.2 MMOL/L (ref 3.6–5.1)
RBC # BLD AUTO: 3.93 X10(6)UL (ref 3.8–5.1)
RED CELL DISTRIBUTION WIDTH-SD: 45.4 FL (ref 35.1–46.3)
SODIUM SERPL-SCNC: 143 MMOL/L (ref 136–144)
TRIGL SERPL-MCNC: 45 MG/DL (ref 30–149)
TSI SER-ACNC: 1.6 MIU/ML (ref 0.35–5.5)
VIT D+METAB SERPL-MCNC: 52.7 NG/ML (ref 30–100)
VLDLC SERPL CALC-MCNC: 9 MG/DL (ref 0–30)
WBC # BLD AUTO: 6.4 X10(3) UL (ref 4–13)

## 2018-08-02 PROCEDURE — 80061 LIPID PANEL: CPT

## 2018-08-02 PROCEDURE — 36415 COLL VENOUS BLD VENIPUNCTURE: CPT

## 2018-08-02 PROCEDURE — 80185 ASSAY OF PHENYTOIN TOTAL: CPT

## 2018-08-02 PROCEDURE — 82306 VITAMIN D 25 HYDROXY: CPT

## 2018-08-02 PROCEDURE — 84443 ASSAY THYROID STIM HORMONE: CPT

## 2018-08-02 PROCEDURE — 83036 HEMOGLOBIN GLYCOSYLATED A1C: CPT

## 2018-08-02 PROCEDURE — 85025 COMPLETE CBC W/AUTO DIFF WBC: CPT

## 2018-08-02 PROCEDURE — 80053 COMPREHEN METABOLIC PANEL: CPT

## 2018-09-08 ENCOUNTER — APPOINTMENT (OUTPATIENT)
Dept: GENERAL RADIOLOGY | Facility: HOSPITAL | Age: 78
End: 2018-09-08
Attending: EMERGENCY MEDICINE
Payer: MEDICARE

## 2018-09-08 ENCOUNTER — HOSPITAL ENCOUNTER (EMERGENCY)
Facility: HOSPITAL | Age: 78
Discharge: HOME | End: 2018-09-08
Attending: EMERGENCY MEDICINE
Payer: MEDICARE

## 2018-09-08 ENCOUNTER — APPOINTMENT (OUTPATIENT)
Dept: CT IMAGING | Facility: HOSPITAL | Age: 78
End: 2018-09-08
Attending: EMERGENCY MEDICINE
Payer: MEDICARE

## 2018-09-08 VITALS
SYSTOLIC BLOOD PRESSURE: 168 MMHG | RESPIRATION RATE: 16 BRPM | HEIGHT: 62 IN | DIASTOLIC BLOOD PRESSURE: 80 MMHG | OXYGEN SATURATION: 100 % | WEIGHT: 160 LBS | BODY MASS INDEX: 29.44 KG/M2 | TEMPERATURE: 98 F | HEART RATE: 70 BPM

## 2018-09-08 DIAGNOSIS — S20.212A CONTUSION OF RIB ON LEFT SIDE, INITIAL ENCOUNTER: ICD-10-CM

## 2018-09-08 DIAGNOSIS — S09.90XA INJURY OF HEAD, INITIAL ENCOUNTER: Primary | ICD-10-CM

## 2018-09-08 DIAGNOSIS — S80.02XA CONTUSION OF LEFT KNEE, INITIAL ENCOUNTER: ICD-10-CM

## 2018-09-08 PROCEDURE — 73562 X-RAY EXAM OF KNEE 3: CPT | Performed by: EMERGENCY MEDICINE

## 2018-09-08 PROCEDURE — 99285 EMERGENCY DEPT VISIT HI MDM: CPT

## 2018-09-08 PROCEDURE — 99284 EMERGENCY DEPT VISIT MOD MDM: CPT

## 2018-09-08 PROCEDURE — 71045 X-RAY EXAM CHEST 1 VIEW: CPT | Performed by: EMERGENCY MEDICINE

## 2018-09-08 PROCEDURE — 70450 CT HEAD/BRAIN W/O DYE: CPT | Performed by: EMERGENCY MEDICINE

## 2018-09-08 NOTE — ED PROVIDER NOTES
Patient Seen in: [facility] Emergency Department    History   Patient presents with:  Fall (musculoskeletal, neurologic)    Stated Complaint: fall    HPI    Patient is a 61-year-old female comes emergency room for evaluation of a fall.   Patient appa Normocephalic.  small right temporal contusion with ecchymosis per moist mucous membranes. Pupils equal round reactive to light accommodation, extraocular motion is intact, sclerae white, conjunctiva is pink.   Oropharynx is unremarkable, no exudate, denti forward. She hit her head on the right side and landed onto carpet  no LOC. FINDINGS:  No evidence of acute displaced fracture or dislocation. Normal mineralization.   Changes of previous left total knee arthroplasty without evidence of hardware complic radiograph was obtained. COMPARISON:  [facility] , XR CHEST PA + LAT CHEST (CPT=71046), 6/20/2018, 8:52.   INDICATIONS:  fall  PATIENT STATED HISTORY: (As transcribed by Technologist)  Fall  Patient fell Thursday while getting out of the shower her slippers go Impression:  Injury of head, initial encounter  (primary encounter diagnosis)  Contusion of left knee, initial encounter  Contusion of rib on left side, initial encounter    Disposition:  Discharge  9/8/2018  3:33 pm    Follow-up:  Yahaira Goodman MD  [phone]

## 2018-09-08 NOTE — ED INITIAL ASSESSMENT (HPI)
Patient fell Thursday while getting out of the shower her slippers got in the way  She hit her left knee. She was not seen for that fall Today  She was trying to take her stockings and shoes off she lost her balance and went forward.   She hit her head on t

## 2018-10-25 PROBLEM — I10 ESSENTIAL HYPERTENSION: Status: ACTIVE | Noted: 2018-10-25

## 2018-10-25 PROBLEM — E03.9 HYPOTHYROIDISM: Status: ACTIVE | Noted: 2018-10-25

## 2018-10-25 PROBLEM — E78.2 MIXED HYPERLIPIDEMIA: Status: ACTIVE | Noted: 2018-10-25

## 2018-11-15 ENCOUNTER — HOSPITAL ENCOUNTER (INPATIENT)
Facility: HOSPITAL | Age: 78
LOS: 1 days | Discharge: HOME HEALTH | DRG: 101 | End: 2018-11-17
Attending: EMERGENCY MEDICINE | Admitting: INTERNAL MEDICINE
Payer: MEDICARE

## 2018-11-15 ENCOUNTER — APPOINTMENT (OUTPATIENT)
Dept: GENERAL RADIOLOGY | Facility: HOSPITAL | Age: 78
DRG: 101 | End: 2018-11-15
Attending: EMERGENCY MEDICINE
Payer: MEDICARE

## 2018-11-15 ENCOUNTER — APPOINTMENT (OUTPATIENT)
Dept: CT IMAGING | Facility: HOSPITAL | Age: 78
DRG: 101 | End: 2018-11-15
Attending: EMERGENCY MEDICINE
Payer: MEDICARE

## 2018-11-15 DIAGNOSIS — R56.9 CONVULSIONS, UNSPECIFIED CONVULSION TYPE (HCC): Primary | ICD-10-CM

## 2018-11-15 DIAGNOSIS — R56.9 SEIZURE (HCC): ICD-10-CM

## 2018-11-15 LAB
ALBUMIN SERPL-MCNC: 3.9 G/DL (ref 3.1–4.5)
ALBUMIN/GLOB SERPL: 0.9 {RATIO} (ref 1–2)
ALP LIVER SERPL-CCNC: 126 U/L (ref 55–142)
ALT SERPL-CCNC: 26 U/L (ref 14–54)
ANION GAP SERPL CALC-SCNC: 13 MMOL/L (ref 0–18)
APTT PPP: 29.1 SECONDS (ref 26.1–34.6)
AST SERPL-CCNC: 24 U/L (ref 15–41)
BASOPHILS # BLD AUTO: 0.06 X10(3) UL (ref 0–0.1)
BASOPHILS NFR BLD AUTO: 0.6 %
BILIRUB SERPL-MCNC: 0.2 MG/DL (ref 0.1–2)
BUN BLD-MCNC: 16 MG/DL (ref 8–20)
BUN/CREAT SERPL: 17.6 (ref 10–20)
CALCIUM BLD-MCNC: 9.1 MG/DL (ref 8.3–10.3)
CHLORIDE SERPL-SCNC: 103 MMOL/L (ref 101–111)
CO2 SERPL-SCNC: 22 MMOL/L (ref 22–32)
CREAT BLD-MCNC: 0.91 MG/DL (ref 0.55–1.02)
EOSINOPHIL # BLD AUTO: 0.17 X10(3) UL (ref 0–0.3)
EOSINOPHIL NFR BLD AUTO: 1.7 %
ERYTHROCYTE [DISTWIDTH] IN BLOOD BY AUTOMATED COUNT: 13.9 % (ref 11.5–16)
GLOBULIN PLAS-MCNC: 4.2 G/DL (ref 2.8–4.4)
GLUCOSE BLD-MCNC: 93 MG/DL (ref 65–99)
GLUCOSE BLD-MCNC: 94 MG/DL (ref 70–99)
HCT VFR BLD AUTO: 39.9 % (ref 34–50)
HGB BLD-MCNC: 13.7 G/DL (ref 12–16)
IMMATURE GRANULOCYTE COUNT: 0.05 X10(3) UL (ref 0–1)
IMMATURE GRANULOCYTE RATIO %: 0.5 %
INR BLD: 0.99 (ref 0.9–1.1)
LYMPHOCYTES # BLD AUTO: 2.05 X10(3) UL (ref 0.9–4)
LYMPHOCYTES NFR BLD AUTO: 20.5 %
M PROTEIN MFR SERPL ELPH: 8.1 G/DL (ref 6.4–8.2)
MCH RBC QN AUTO: 29.2 PG (ref 27–33.2)
MCHC RBC AUTO-ENTMCNC: 34.3 G/DL (ref 31–37)
MCV RBC AUTO: 85.1 FL (ref 81–100)
MONOCYTES # BLD AUTO: 0.82 X10(3) UL (ref 0.1–1)
MONOCYTES NFR BLD AUTO: 8.2 %
NEUTROPHIL ABS PRELIM: 6.87 X10 (3) UL (ref 1.3–6.7)
NEUTROPHILS # BLD AUTO: 6.87 X10(3) UL (ref 1.3–6.7)
NEUTROPHILS NFR BLD AUTO: 68.5 %
OSMOLALITY SERPL CALC.SUM OF ELEC: 287 MOSM/KG (ref 275–295)
PHENYTOIN (DILANTIN): 24.9 UG/ML (ref 10–20)
PLATELET # BLD AUTO: 217 10(3)UL (ref 150–450)
POTASSIUM SERPL-SCNC: 3.9 MMOL/L (ref 3.6–5.1)
PSA SERPL DL<=0.01 NG/ML-MCNC: 13.5 SECONDS (ref 12.4–14.7)
RBC # BLD AUTO: 4.69 X10(6)UL (ref 3.8–5.1)
RED CELL DISTRIBUTION WIDTH-SD: 43 FL (ref 35.1–46.3)
SODIUM SERPL-SCNC: 138 MMOL/L (ref 136–144)
TROPONIN I SERPL-MCNC: <0.046 NG/ML (ref ?–0.05)
WBC # BLD AUTO: 10 X10(3) UL (ref 4–13)

## 2018-11-15 PROCEDURE — 71045 X-RAY EXAM CHEST 1 VIEW: CPT | Performed by: EMERGENCY MEDICINE

## 2018-11-15 PROCEDURE — 70450 CT HEAD/BRAIN W/O DYE: CPT | Performed by: EMERGENCY MEDICINE

## 2018-11-15 RX ORDER — LORAZEPAM 2 MG/ML
INJECTION INTRAMUSCULAR
Status: COMPLETED
Start: 2018-11-15 | End: 2018-11-15

## 2018-11-15 RX ORDER — LORAZEPAM 2 MG/ML
0.5 INJECTION INTRAMUSCULAR ONCE
Status: COMPLETED | OUTPATIENT
Start: 2018-11-15 | End: 2018-11-15

## 2018-11-16 ENCOUNTER — HOSPITAL ENCOUNTER (EMERGENCY)
Facility: HOSPITAL | Age: 78
Discharge: STILL ADMITTED OR ADMINISTRATIVE | End: 2018-11-17
Payer: COMMERCIAL

## 2018-11-16 PROBLEM — R56.9 CONVULSIONS, UNSPECIFIED CONVULSION TYPE (HCC): Status: ACTIVE | Noted: 2018-11-16

## 2018-11-16 LAB
ANION GAP SERPL CALC-SCNC: 7 MMOL/L (ref 0–18)
ATRIAL RATE: 94 BPM
BASOPHILS # BLD AUTO: 0.06 X10(3) UL (ref 0–0.1)
BASOPHILS NFR BLD AUTO: 0.7 %
BUN BLD-MCNC: 13 MG/DL (ref 8–20)
BUN/CREAT SERPL: 17.8 (ref 10–20)
CALCIUM BLD-MCNC: 8.3 MG/DL (ref 8.3–10.3)
CHLORIDE SERPL-SCNC: 107 MMOL/L (ref 101–111)
CO2 SERPL-SCNC: 23 MMOL/L (ref 22–32)
CREAT BLD-MCNC: 0.73 MG/DL (ref 0.55–1.02)
EOSINOPHIL # BLD AUTO: 0.18 X10(3) UL (ref 0–0.3)
EOSINOPHIL NFR BLD AUTO: 2.1 %
ERYTHROCYTE [DISTWIDTH] IN BLOOD BY AUTOMATED COUNT: 14.3 % (ref 11.5–16)
GLUCOSE BLD-MCNC: 116 MG/DL (ref 70–99)
HCT VFR BLD AUTO: 36.4 % (ref 34–50)
HGB BLD-MCNC: 12.4 G/DL (ref 12–16)
IMMATURE GRANULOCYTE COUNT: 0.02 X10(3) UL (ref 0–1)
IMMATURE GRANULOCYTE RATIO %: 0.2 %
LYMPHOCYTES # BLD AUTO: 1.79 X10(3) UL (ref 0.9–4)
LYMPHOCYTES NFR BLD AUTO: 20.4 %
MCH RBC QN AUTO: 29.2 PG (ref 27–33.2)
MCHC RBC AUTO-ENTMCNC: 34.1 G/DL (ref 31–37)
MCV RBC AUTO: 85.6 FL (ref 81–100)
MONOCYTES # BLD AUTO: 0.7 X10(3) UL (ref 0.1–1)
MONOCYTES NFR BLD AUTO: 8 %
NEUTROPHIL ABS PRELIM: 6.01 X10 (3) UL (ref 1.3–6.7)
NEUTROPHILS # BLD AUTO: 6.01 X10(3) UL (ref 1.3–6.7)
NEUTROPHILS NFR BLD AUTO: 68.6 %
OSMOLALITY SERPL CALC.SUM OF ELEC: 285 MOSM/KG (ref 275–295)
P AXIS: 63 DEGREES
P-R INTERVAL: 160 MS
PLATELET # BLD AUTO: 197 10(3)UL (ref 150–450)
POTASSIUM SERPL-SCNC: 3.4 MMOL/L (ref 3.6–5.1)
Q-T INTERVAL: 368 MS
QRS DURATION: 84 MS
QTC CALCULATION (BEZET): 460 MS
R AXIS: -3 DEGREES
RBC # BLD AUTO: 4.25 X10(6)UL (ref 3.8–5.1)
RED CELL DISTRIBUTION WIDTH-SD: 43.9 FL (ref 35.1–46.3)
SODIUM SERPL-SCNC: 137 MMOL/L (ref 136–144)
T AXIS: 51 DEGREES
T4 FREE SERPL-MCNC: 1 NG/DL (ref 0.9–1.8)
TSI SER-ACNC: 6.08 MIU/ML (ref 0.35–5.5)
VENTRICULAR RATE: 94 BPM
WBC # BLD AUTO: 8.8 X10(3) UL (ref 4–13)

## 2018-11-16 PROCEDURE — 99223 1ST HOSP IP/OBS HIGH 75: CPT | Performed by: NURSE PRACTITIONER

## 2018-11-16 RX ORDER — LORAZEPAM 2 MG/ML
1 INJECTION INTRAMUSCULAR EVERY 4 HOURS PRN
Status: DISCONTINUED | OUTPATIENT
Start: 2018-11-16 | End: 2018-11-16

## 2018-11-16 RX ORDER — FLUTICASONE PROPIONATE 50 MCG
1 SPRAY, SUSPENSION (ML) NASAL EVERY 12 HOURS
Status: DISCONTINUED | OUTPATIENT
Start: 2018-11-16 | End: 2018-11-17

## 2018-11-16 RX ORDER — HEPARIN SODIUM 5000 [USP'U]/ML
5000 INJECTION, SOLUTION INTRAVENOUS; SUBCUTANEOUS EVERY 8 HOURS SCHEDULED
Status: DISCONTINUED | OUTPATIENT
Start: 2018-11-16 | End: 2018-11-17

## 2018-11-16 RX ORDER — PANTOPRAZOLE SODIUM 40 MG/1
40 TABLET, DELAYED RELEASE ORAL
Status: DISCONTINUED | OUTPATIENT
Start: 2018-11-16 | End: 2018-11-17

## 2018-11-16 RX ORDER — LEVOTHYROXINE SODIUM 0.2 MG/1
200 TABLET ORAL
Status: DISCONTINUED | OUTPATIENT
Start: 2018-11-16 | End: 2018-11-17

## 2018-11-16 RX ORDER — SODIUM CHLORIDE 9 MG/ML
INJECTION, SOLUTION INTRAVENOUS CONTINUOUS
Status: DISCONTINUED | OUTPATIENT
Start: 2018-11-16 | End: 2018-11-17

## 2018-11-16 RX ORDER — POTASSIUM CHLORIDE 20 MEQ/1
40 TABLET, EXTENDED RELEASE ORAL EVERY 4 HOURS
Status: COMPLETED | OUTPATIENT
Start: 2018-11-16 | End: 2018-11-16

## 2018-11-16 RX ORDER — PHENYTOIN SODIUM 100 MG/1
400 CAPSULE, EXTENDED RELEASE ORAL
Status: DISCONTINUED | OUTPATIENT
Start: 2018-11-17 | End: 2018-11-16

## 2018-11-16 RX ORDER — ASPIRIN 81 MG/1
81 TABLET, CHEWABLE ORAL DAILY
Status: DISCONTINUED | OUTPATIENT
Start: 2018-11-16 | End: 2018-11-17

## 2018-11-16 RX ORDER — ECHINACEA PURPUREA EXTRACT 125 MG
1 TABLET ORAL
Status: DISCONTINUED | OUTPATIENT
Start: 2018-11-16 | End: 2018-11-17

## 2018-11-16 RX ORDER — ATORVASTATIN CALCIUM 20 MG/1
20 TABLET, FILM COATED ORAL NIGHTLY
Status: DISCONTINUED | OUTPATIENT
Start: 2018-11-16 | End: 2018-11-17

## 2018-11-16 RX ORDER — ONDANSETRON 2 MG/ML
4 INJECTION INTRAMUSCULAR; INTRAVENOUS EVERY 4 HOURS PRN
Status: DISCONTINUED | OUTPATIENT
Start: 2018-11-16 | End: 2018-11-17

## 2018-11-16 RX ORDER — GABAPENTIN 300 MG/1
300 CAPSULE ORAL 2 TIMES DAILY
Status: DISCONTINUED | OUTPATIENT
Start: 2018-11-16 | End: 2018-11-17

## 2018-11-16 RX ORDER — SODIUM CHLORIDE 9 MG/ML
INJECTION, SOLUTION INTRAVENOUS CONTINUOUS
Status: DISCONTINUED | OUTPATIENT
Start: 2018-11-16 | End: 2018-11-16

## 2018-11-16 RX ORDER — METOCLOPRAMIDE HYDROCHLORIDE 5 MG/ML
10 INJECTION INTRAMUSCULAR; INTRAVENOUS EVERY 8 HOURS PRN
Status: DISCONTINUED | OUTPATIENT
Start: 2018-11-16 | End: 2018-11-17

## 2018-11-16 RX ORDER — BUMETANIDE 1 MG/1
0.5 TABLET ORAL DAILY
Status: DISCONTINUED | OUTPATIENT
Start: 2018-11-16 | End: 2018-11-17

## 2018-11-16 RX ORDER — LORAZEPAM 2 MG/ML
1 INJECTION INTRAMUSCULAR
Status: DISCONTINUED | OUTPATIENT
Start: 2018-11-16 | End: 2018-11-17

## 2018-11-16 RX ORDER — ACETAMINOPHEN 325 MG/1
650 TABLET ORAL EVERY 6 HOURS PRN
Status: DISCONTINUED | OUTPATIENT
Start: 2018-11-16 | End: 2018-11-17

## 2018-11-16 NOTE — ED NOTES
RN spoke with patients daughter, [facility]. Notified daughter that patient will be admitted to the hospital due to having a seizure here in the emergency room. All questions/concerns addressed at this time.

## 2018-11-16 NOTE — PROGRESS NOTES
11/16/18 [address] [place] [address] of [facility]-Anointing Visiting  anointed patient   The patient was seen by Keith Capone. Received prayer, Scripture, support and Sacrament of the Sick.   remain available at the pag

## 2018-11-16 NOTE — PROGRESS NOTES
ICU  Critical Care APRN H & P    NAME: Letty Duncan - ROOM: [address] [place] [address] - MRN: [id number] - Age: 70year old - :1940    History Of Present Illness:  Letty Duncan is a 70year old female with PMHx significant for seizures, CAD s/p stents, hypothyr bruits  Lungs: Clear to auscultation bilaterally, respirations unlabored  Heart: Regular rate and rhythm, S1 and S2 normal, no murmur, rub or gallop  Abdomen: Soft, non-tender, bowel sounds active all four quadrants, no masses, no organomegaly  Extremities chronic small vessel ischemic disease is noted. No evidence of intracranial hemorrhage or extra-axial fluid collection.    Dictated by: Jenny Cervantes MD on 11/15/2018 at 21:33     Approved by: Jenny Cervantes MD            Xr Chest Ap Portable  (cpt ASA, BB    F/E/N:    - Cardiac diet as tolerated  - NS @ 75mL/hr  - Electrolyte replacement per protcol.      Proph:  -SQ heparin  -SCD    Dispo:   -Full code      Assessment and plan discussed with Siobhan Courser, [address] [place] [facility]

## 2018-11-16 NOTE — CONSULTS
Matt 2  Neurology  Hospital Consultation Report    Jerson Alanis Patient Status:  Inpatient    1940 MRN [id number]   [facility] 4SW-A Attending Jaron Gill MD   Hosp Day # 0 PCP None Pcp   Date of Admission:  11/15/20 Smoker      Smokeless tobacco: Never Used    Alcohol use: Not on file    Drug use: Not on file         Current Medications:    Current Facility-Administered Medications:  ondansetron HCl (ZOFRAN) injection 4 mg 4 mg Intravenous Q4H PRN   LORazepam (ATIVAN) (10 mg total) by mouth daily. Vitamin D3 1000 units Oral Tab Take 1,000 Units by mouth daily. loratadine 10 MG Oral Tab Take 10 mg by mouth daily as needed for Allergies.    Calcium Carbonate-Vit D-Min (CALCIUM 600+D PLUS MINERALS) 600-400 MG-UNIT Oral anxiety  HEMATOLOGIC: no reports of excessive bruising or bleeding  ENDOCRINE: blood sugars under good control  MUSCULOSKELETAL: no joint pain, redness, swelling  NEURO: as above    Physical Exam:   Physical Exam:  /78 (BP Location: Left arm)   Pulse 11/15/2018    PTT 29.1 11/15/2018    INR 0.99 11/15/2018    PTP 13.5 11/15/2018    T4F 1.0 11/16/2018    TSH 6.080 (H) 11/16/2018    ESRML 18 06/20/2018    MG 2.3 06/20/2018    TROP <0.046 11/15/2018    B12 475 06/20/2018       Imaging:  Ct Brain Or Head ( radiograph was obtained. COMPARISON:  [facility] , XR CHEST AP PORTABLE  (CPT=71045), 9/08/2018, 13:50.   INDICATIONS:  tingling  PATIENT STATED HISTORY: (As transcribed by Technologist)  Tingling down left arm, anxiety    FINDINGS: Cardiac silhouette and pulm

## 2018-11-16 NOTE — ED NOTES
At 2200 RN rounding on patient, noticed patients HR elevated to 120's and patient was tachyonic and not responding to verbal stimuli. MD to bedside, orders received for ativan, accucheck obtained. Patient more arousable post ativan administration.  Episode

## 2018-11-16 NOTE — ED PROVIDER NOTES
Patient Seen in: [facility] Emergency Department    History   Patient presents with:  Numbness Weakness (neurologic)    Stated Complaint: tingling     HPI    The patient is a 16-year-old female who presents emergency room with a history of seizures Current:/83   Pulse 98   Temp 98.3 °F (36.8 °C) (Temporal)   Resp 18   Ht 157.5 cm (5' 2\")   Wt 77.1 kg   SpO2 99%   BMI 31.09 kg/m²         Physical Exam  GENERAL: Well-developed, well-nourished female sitting up breathing easily in no appare Normal   PTT, ACTIVATED - Normal   POCT GLUCOSE - Normal   CBC WITH DIFFERENTIAL WITH PLATELET    Narrative: The following orders were created for panel order CBC WITH DIFFERENTIAL WITH PLATELET.   Procedure                               Abnormality in the left supra clinoid internal carotid artery extending into the left M1 segment is again noted. No significant changes since prior exam.  Sequelae of chronic small vessel ischemic disease is noted.  No evidence of intracranial hemorrhage or extra-axia and blood sugar all of which are unremarkable. Patient liver function tests troponin are found to be negative. Patient's coags are unremarkable. Patient's Dilantin level was mildly elevated at 24.9.   Patient was placed on a continuous pulse ox and cardi and tachycardia due to her underlying seizure disorder. This involved direct patient intervention, complex decision making, and/or extensive discussions with the patient, family, and clinical staff.         Disposition and Plan     Clinical Impression:  Co

## 2018-11-16 NOTE — ED INITIAL ASSESSMENT (HPI)
Patient with complaints of tingling radiating down to the left arm with intermittent shortness of breath that has been going on for two weeks. Was seen two weeks ago and had medication changes and patient states symptoms have not improved.

## 2018-11-16 NOTE — ED NOTES
Report given to [address]. Patient updated with plan of care, no questions at this time. Patient transported with RN and monitor.

## 2018-11-16 NOTE — CONSULTS
Pulmonary H&P/Consult       NAME: Pelon Haque - ROOM: [address] [place] [address]- - MRN: [id number] - Age: 70year old - :  1940    Date of Admission: 11/15/2018  8:02 PM  Admission Diagnosis: Convulsions, unspecified convulsion type ([facility] [R56.9]  Reason for cons Rfl: 6 11/15/2018 at Unknown time   PHENYTOIN SODIUM EXTENDED 200 MG Oral Cap TAKE 1 CAPSULE BY MOUTH NIGHTLY.  ALTERNATE BETWEEN 150 MG  MG EVERY OTHER DAY AS DIRECTED Disp: 30 capsule Rfl: 0 Past Week at Unknown time   LEVOTHYROXINE SODIUM 200 MCG mg by mouth every 6 (six) hours as needed for Itching. Disp:  Rfl:  Past Week at Unknown time   atorvastatin (LIPITOR) 20 MG Oral Tab Take 1 tablet (20 mg total) by mouth nightly.  Disp: 90 tablet Rfl: 0 Unknown at Unknown time   ergocalciferol (DRISDOL) 50 ALTERNATE BETWEEN 150 MG  MG EVERY OTHER DAY AS DIRECTED Disp: 30 capsule Rfl: 0   LEVOTHYROXINE SODIUM 200 MCG Oral Tab TAKE 1 TABLET BY MOUTH AT 6:30AM BEFORE BREAKFAST Disp: 90 tablet Rfl: 0   GABAPENTIN 300 MG Oral Cap TAKE ONE CAPSULE BY MOUTH metoprolol Tartrate  25 mg Oral 2x Daily(Beta Blocker)   • Phenytoin Sodium Extended  350 mg Oral Once per day on Sun Mon Wed Fri   • [START ON 11/17/2018] Phenytoin Sodium Extended  400 mg Oral Once per day on Tue Thu Sat     Continuous Infusing Medicatio PERRL, conjunctiva/corneas clear, EOM's intact, both eyes   Throat:   Lips, mucosa, and tongue normal; teeth and gums normal   Neck:   Supple, symmetrical, trachea midline, no adenopathy;        thyroid:  No enlargement/tenderness/nodules; no carotid    br 05:48 AM 0.752 0.350 - 5.500 mIU/mL Final     Albumin   Date/Time Value Ref Range Status   11/15/2018 08:23 PM 3.9 3.1 - 4.5 g/dL Final   09/13/2017 03:22 PM 3.9 3.5 - 4.8 g/dL Final       Imaging: chest x-ray reviewed- clear lung fields eml    ASSESSMENT/

## 2018-11-16 NOTE — PLAN OF CARE
METABOLIC/FLUID AND ELECTROLYTES - ADULT    • Hemodynamic stability and optimal renal function maintained Progressing        NEUROLOGICAL - ADULT    • Absence of seizures Progressing    • Remains free of injury related to seizure activity Progressing

## 2018-11-16 NOTE — H&P
Amanda U. 96. Patient Status:  Inpatient    1940 MRN [id number]   [facility] 4SW-A Attending Kevin Mcneal MD   Hosp Day # 0 PCP None Pcp     History of Present Illness:  Anali Hines is a(n) 1 TABLET BY MOUTH AT 6:30AM BEFORE BREAKFAST Disp: 90 tablet Rfl: 0 11/15/2018 at Unknown time   GABAPENTIN 300 MG Oral Cap TAKE ONE CAPSULE BY MOUTH TWICE DAILY Disp: 180 capsule Rfl: 0 11/15/2018 at Unknown time   Lacosamide 100 MG Oral Tab Take 1 tablet Cap Take 50,000 Units by mouth once a week. Disp:  Rfl:  Unknown at Unknown time   hydrocortisone 2.5 % External Cream Apply topically 2 (two) times daily as needed.  For itching/rash  Disp:  Rfl:  Unknown at Unknown time       Review of Systems:  A compreh 9/08/2018, 14:35. INDICATIONS:  tingling  TECHNIQUE:  Noncontrast CT scanning is performed through the brain. Dose reduction techniques were used.  Dose information is transmitted to the ACR ([address] [place] [address] of Radiology) [address] [facility] Radiology Data Reg radiograph.      Dictated by: Paulette Trent MD on 11/15/2018 at 21:01     Approved by: Paulette Trent MD               Assessment & Plan:    Recurrent Seizure , already on multiple medications for Seizure  Await neurology consult    CAD- stable  HT

## 2018-11-16 NOTE — PAYOR COMM NOTE
--------------  ADMISSION REVIEW       11/16  ED    Numbness Weakness      Stated Complaint: tingling      HPI     The patient is a 61-year-old female who presents emergency room with a history of seizures that she has had over the last 7 or 8 years as per all 4 extremities. There are no gross motor or sensory deficits appreciated. Cranial nerves II through XII are intact.   Patient is answering all questions appropriately.             HX  SEIZURE  HTN    VS  T 99.5  P 103  R 7  & 28  172/120  SATS 91      La AWAITING ADMISSION TO ICU AT THIS TIME.      The patient had IV line established and blood work done including a CBC, chemistries, BUN and creatinine, and blood sugar all of which are unremarkable.   Patient liver function tests troponin are found to be neg time.            ED COURSE      Clinical Impression:  Convulsions  DYSPNEA    TO ICU     ATIVAN IV X 2  IVF 75  ORAL DILANTIN

## 2018-11-16 NOTE — PROCEDURES
ELECTROENCEPHALOGRAM REPORT      Patient Name: Siri Rubin  Chart ID: [id number]  Ordering Physician: Cheryle Brown M.D. Date of Test:  November 16, 2018  Patient Diagnosis:  seizures    A portable bedside EEG was obtained. No sedation was given.

## 2018-11-17 VITALS
SYSTOLIC BLOOD PRESSURE: 122 MMHG | HEART RATE: 79 BPM | WEIGHT: 181.69 LBS | RESPIRATION RATE: 18 BRPM | OXYGEN SATURATION: 96 % | BODY MASS INDEX: 33.43 KG/M2 | HEIGHT: 62 IN | DIASTOLIC BLOOD PRESSURE: 66 MMHG | TEMPERATURE: 98 F

## 2018-11-17 LAB
ANION GAP SERPL CALC-SCNC: 9 MMOL/L (ref 0–18)
BASOPHILS # BLD AUTO: 0.08 X10(3) UL (ref 0–0.1)
BASOPHILS NFR BLD AUTO: 1.1 %
BUN BLD-MCNC: 11 MG/DL (ref 8–20)
BUN/CREAT SERPL: 16.4 (ref 10–20)
CALCIUM BLD-MCNC: 8.2 MG/DL (ref 8.3–10.3)
CHLORIDE SERPL-SCNC: 113 MMOL/L (ref 101–111)
CO2 SERPL-SCNC: 20 MMOL/L (ref 22–32)
CREAT BLD-MCNC: 0.67 MG/DL (ref 0.55–1.02)
EOSINOPHIL # BLD AUTO: 0.29 X10(3) UL (ref 0–0.3)
EOSINOPHIL NFR BLD AUTO: 3.8 %
ERYTHROCYTE [DISTWIDTH] IN BLOOD BY AUTOMATED COUNT: 14.4 % (ref 11.5–16)
GLUCOSE BLD-MCNC: 90 MG/DL (ref 70–99)
HCT VFR BLD AUTO: 34.9 % (ref 34–50)
HGB BLD-MCNC: 11.1 G/DL (ref 12–16)
IMMATURE GRANULOCYTE COUNT: 0.02 X10(3) UL (ref 0–1)
IMMATURE GRANULOCYTE RATIO %: 0.3 %
LYMPHOCYTES # BLD AUTO: 2.52 X10(3) UL (ref 0.9–4)
LYMPHOCYTES NFR BLD AUTO: 33.3 %
MCH RBC QN AUTO: 28.3 PG (ref 27–33.2)
MCHC RBC AUTO-ENTMCNC: 31.8 G/DL (ref 31–37)
MCV RBC AUTO: 89 FL (ref 81–100)
MONOCYTES # BLD AUTO: 0.75 X10(3) UL (ref 0.1–1)
MONOCYTES NFR BLD AUTO: 9.9 %
NEUTROPHIL ABS PRELIM: 3.9 X10 (3) UL (ref 1.3–6.7)
NEUTROPHILS # BLD AUTO: 3.9 X10(3) UL (ref 1.3–6.7)
NEUTROPHILS NFR BLD AUTO: 51.6 %
OSMOLALITY SERPL CALC.SUM OF ELEC: 293 MOSM/KG (ref 275–295)
PHENYTOIN (DILANTIN): 16.3 UG/ML (ref 10–20)
PLATELET # BLD AUTO: 175 10(3)UL (ref 150–450)
POTASSIUM SERPL-SCNC: 4.4 MMOL/L (ref 3.6–5.1)
POTASSIUM SERPL-SCNC: 4.4 MMOL/L (ref 3.6–5.1)
RBC # BLD AUTO: 3.92 X10(6)UL (ref 3.8–5.1)
RED CELL DISTRIBUTION WIDTH-SD: 46.5 FL (ref 35.1–46.3)
SODIUM SERPL-SCNC: 142 MMOL/L (ref 136–144)
WBC # BLD AUTO: 7.6 X10(3) UL (ref 4–13)

## 2018-11-17 RX ORDER — PHENYTOIN SODIUM 100 MG/1
100 CAPSULE, EXTENDED RELEASE ORAL
Status: DISCONTINUED | OUTPATIENT
Start: 2018-11-18 | End: 2018-11-17

## 2018-11-17 RX ORDER — PHENYTOIN SODIUM 100 MG/1
400 CAPSULE, EXTENDED RELEASE ORAL
Status: DISCONTINUED | OUTPATIENT
Start: 2018-11-19 | End: 2018-11-17

## 2018-11-17 RX ORDER — PHENYTOIN SODIUM 100 MG/1
CAPSULE, EXTENDED RELEASE ORAL
Qty: 30 CAPSULE | Refills: 6 | Status: SHIPPED | OUTPATIENT
Start: 2018-11-17 | End: 2019-01-16

## 2018-11-17 RX ORDER — PHENYTOIN SODIUM 100 MG/1
400 CAPSULE, EXTENDED RELEASE ORAL ONCE
Status: COMPLETED | OUTPATIENT
Start: 2018-11-17 | End: 2018-11-17

## 2018-11-17 RX ORDER — PHENYTOIN SODIUM 200 MG/1
CAPSULE, EXTENDED RELEASE ORAL
Qty: 30 CAPSULE | Refills: 0 | Status: SHIPPED | OUTPATIENT
Start: 2018-11-17 | End: 2019-01-16

## 2018-11-17 RX ORDER — PHENYTOIN SODIUM 100 MG/1
300 CAPSULE, EXTENDED RELEASE ORAL DAILY
Status: DISCONTINUED | OUTPATIENT
Start: 2018-11-17 | End: 2018-11-17

## 2018-11-17 RX ORDER — PHENYTOIN 50 MG/1
50 TABLET, CHEWABLE ORAL DAILY
Status: DISCONTINUED | OUTPATIENT
Start: 2018-11-17 | End: 2018-11-17

## 2018-11-17 NOTE — DISCHARGE SUMMARY
[facility]  Discharge Summary    Laurita Resendiz Patient Status:  Inpatient    1940 MRN [id number]   [facility] 4NW-A Attending Jenni Vargas MD   [facility] Day # 1 PCP Harhsil Parmar MD     Date of Admission: 11/15/2018    Date of Disc 3  Associated Diagnoses:Seizure (HCC)    cetirizine 10 MG Oral Tab  Take 1 tablet (10 mg total) by mouth daily. Qty: 30 tablet Refills: 0    Vitamin D3 1000 units Oral Tab  Take 1,000 Units by mouth daily.     Calcium Carbonate-Vit D-Min (CALCIUM 600+D PLU

## 2018-11-17 NOTE — PROGRESS NOTES
Pt ok to transfer to floor, no tele or pulse ox ordered. Bed assignment room 422. Pt informed and notified daughter Deena Lopez of transfer. All belongings packed and ready at bedside. Report called to larry. Awaiting transport.

## 2018-11-17 NOTE — PLAN OF CARE
Patient is alert and oriented x 4 . Vitals stable . No c/o pain or discomfort. No seizures noted. Sat up in the chair all morning. Gait is steady. Ok to discharge per Neurology. Fall precautions observed. Will continue to monitor.     1514: Pt is from [facility]

## 2018-11-17 NOTE — PROGRESS NOTES
Received as a transfer  From ICU per wheelchair, alert and oriented, no s/s of distress, seizure precaution, oriented to unit routine, standby assist with ambulation, IvFluids, denies pain,  Will continue  To monitor for seizure episode.    0700: Per ICU RN

## 2018-11-17 NOTE — PROGRESS NOTES
[facility]  Neurology  Hospital Progress Report    [place] Fay Patient Status:  Inpatient    1940 MRN [id number]   [facility] 4NW-A Attending Yang Saldana MD   [facility] Day # 1 PCP Claudine Lynn MD   Date of Admission:   0.9%  NaCl infusion  Intravenous Continuous   Heparin Sodium (Porcine) 5000 UNIT/ML injection 5,000 Units 5,000 Units Subcutaneous Q8H [address]   acetaminophen (TYLENOL) tab 650 mg 650 mg Oral Q6H PRN   Metoclopramide HCl (REGLAN) injection 10 mg 10 mg Lenell Vernon ClonazePAM 1 MG Oral Tab Take 1 mg by mouth nightly. aspirin 81 MG Oral Tab Take 81 mg by mouth daily.    Fluticasone Propionate 50 MCG/ACT Nasal Suspension 1 spray by Each Nare route Q12H.     metoprolol Tartrate 25 MG Oral Tab Take 25 mg by mouth 2 (t (1.575 m)   Wt 181 lb 11.2 oz (82.4 kg)   SpO2 96%   BMI 33.23 kg/m²    General: The patient appears attentive, bright and well composed. Head: Normocephalic, atraumatic. Eyes: anicteric  ENT: normal tongue, normal mucosa.    Neck: supple,   Cardiovascula tingling  TECHNIQUE:  Noncontrast CT scanning is performed through the brain. Dose reduction techniques were used.  Dose information is transmitted to the [facility]) [address] ([address]) which includes the Dose In Tomas Meza MD on 11/15/2018 at 21:01     Approved by: Tomas Meza MD            Pertinent Labs and Imaging: Reviewed.     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

## 2018-11-19 LAB — LACOSAMIDE, SERUM OR PLASMA: 2.4 UG/ML

## 2018-11-20 LAB — Lab: 1.5 UG/ML

## 2018-11-27 ENCOUNTER — APPOINTMENT (OUTPATIENT)
Dept: LAB | Age: 78
End: 2018-11-27
Attending: INTERNAL MEDICINE
Payer: MEDICARE

## 2018-11-27 DIAGNOSIS — R56.9 CONVULSIONS, UNSPECIFIED CONVULSION TYPE (HCC): ICD-10-CM

## 2018-11-27 PROCEDURE — 36415 COLL VENOUS BLD VENIPUNCTURE: CPT

## 2018-11-27 PROCEDURE — 80185 ASSAY OF PHENYTOIN TOTAL: CPT

## 2019-01-22 ENCOUNTER — NURSE ONLY (OUTPATIENT)
Dept: LAB | Age: 79
End: 2019-01-22
Attending: INTERNAL MEDICINE
Payer: MEDICARE

## 2019-01-22 DIAGNOSIS — I10 ESSENTIAL HYPERTENSION: ICD-10-CM

## 2019-01-22 DIAGNOSIS — I10 ESSENTIAL HYPERTENSION, MALIGNANT: ICD-10-CM

## 2019-01-22 DIAGNOSIS — R56.9 CONVULSIONS, UNSPECIFIED CONVULSION TYPE (HCC): ICD-10-CM

## 2019-01-22 DIAGNOSIS — G40.109 SPECIAL SENSORY ATTACKS (HCC): Primary | ICD-10-CM

## 2019-01-22 LAB
ALBUMIN SERPL-MCNC: 3.1 G/DL (ref 3.1–4.5)
ALBUMIN/GLOB SERPL: 0.9 {RATIO} (ref 1–2)
ALP LIVER SERPL-CCNC: 97 U/L (ref 55–142)
ALT SERPL-CCNC: 17 U/L (ref 14–54)
ANION GAP SERPL CALC-SCNC: 7 MMOL/L (ref 0–18)
AST SERPL-CCNC: 13 U/L (ref 15–41)
BASOPHILS # BLD AUTO: 0.06 X10(3) UL (ref 0–0.1)
BASOPHILS NFR BLD AUTO: 0.8 %
BILIRUB SERPL-MCNC: 0.2 MG/DL (ref 0.1–2)
BUN BLD-MCNC: 15 MG/DL (ref 8–20)
BUN/CREAT SERPL: 20.5 (ref 10–20)
CALCIUM BLD-MCNC: 8.5 MG/DL (ref 8.3–10.3)
CHLORIDE SERPL-SCNC: 110 MMOL/L (ref 101–111)
CO2 SERPL-SCNC: 26 MMOL/L (ref 22–32)
CREAT BLD-MCNC: 0.73 MG/DL (ref 0.55–1.02)
EOSINOPHIL # BLD AUTO: 0.54 X10(3) UL (ref 0–0.3)
EOSINOPHIL NFR BLD AUTO: 7.4 %
ERYTHROCYTE [DISTWIDTH] IN BLOOD BY AUTOMATED COUNT: 14.3 % (ref 11.5–16)
GLOBULIN PLAS-MCNC: 3.4 G/DL (ref 2.8–4.4)
GLUCOSE BLD-MCNC: 82 MG/DL (ref 70–99)
HCT VFR BLD AUTO: 36.6 % (ref 34–50)
HGB BLD-MCNC: 11.9 G/DL (ref 12–16)
IMMATURE GRANULOCYTE COUNT: 0.02 X10(3) UL (ref 0–1)
IMMATURE GRANULOCYTE RATIO %: 0.3 %
LYMPHOCYTES # BLD AUTO: 2.17 X10(3) UL (ref 0.9–4)
LYMPHOCYTES NFR BLD AUTO: 29.8 %
M PROTEIN MFR SERPL ELPH: 6.5 G/DL (ref 6.4–8.2)
MCH RBC QN AUTO: 29.3 PG (ref 27–33.2)
MCHC RBC AUTO-ENTMCNC: 32.5 G/DL (ref 31–37)
MCV RBC AUTO: 90.1 FL (ref 81–100)
MONOCYTES # BLD AUTO: 0.7 X10(3) UL (ref 0.1–1)
MONOCYTES NFR BLD AUTO: 9.6 %
NEUTROPHIL ABS PRELIM: 3.79 X10 (3) UL (ref 1.3–6.7)
NEUTROPHILS # BLD AUTO: 3.79 X10(3) UL (ref 1.3–6.7)
NEUTROPHILS NFR BLD AUTO: 52.1 %
OSMOLALITY SERPL CALC.SUM OF ELEC: 296 MOSM/KG (ref 275–295)
PHENYTOIN (DILANTIN): 15.7 UG/ML (ref 10–20)
PLATELET # BLD AUTO: 203 10(3)UL (ref 150–450)
POTASSIUM SERPL-SCNC: 4 MMOL/L (ref 3.6–5.1)
RBC # BLD AUTO: 4.06 X10(6)UL (ref 3.8–5.1)
RED CELL DISTRIBUTION WIDTH-SD: 47.8 FL (ref 35.1–46.3)
SODIUM SERPL-SCNC: 143 MMOL/L (ref 136–144)
WBC # BLD AUTO: 7.3 X10(3) UL (ref 4–13)

## 2019-01-22 PROCEDURE — 36415 COLL VENOUS BLD VENIPUNCTURE: CPT

## 2019-01-22 PROCEDURE — 85025 COMPLETE CBC W/AUTO DIFF WBC: CPT

## 2019-01-22 PROCEDURE — 80185 ASSAY OF PHENYTOIN TOTAL: CPT

## 2019-01-22 PROCEDURE — 80053 COMPREHEN METABOLIC PANEL: CPT

## 2019-04-18 ENCOUNTER — APPOINTMENT (OUTPATIENT)
Dept: LAB | Age: 79
End: 2019-04-18
Attending: INTERNAL MEDICINE
Payer: MEDICARE

## 2019-04-18 DIAGNOSIS — R56.9 CONVULSIONS, UNSPECIFIED CONVULSION TYPE (HCC): ICD-10-CM

## 2019-04-18 PROCEDURE — 36415 COLL VENOUS BLD VENIPUNCTURE: CPT

## 2019-04-18 PROCEDURE — 80185 ASSAY OF PHENYTOIN TOTAL: CPT

## 2019-05-15 PROBLEM — F03.90 DEMENTIA WITHOUT BEHAVIORAL DISTURBANCE (HCC): Status: ACTIVE | Noted: 2019-05-15

## 2019-05-15 PROBLEM — L40.9 SCALP PSORIASIS: Status: ACTIVE | Noted: 2019-05-15

## 2019-05-15 PROBLEM — E66.09 OBESITY DUE TO EXCESS CALORIES: Status: ACTIVE | Noted: 2019-05-15

## 2019-05-15 PROBLEM — I25.10 CORONARY ARTERY DISEASE INVOLVING NATIVE CORONARY ARTERY OF NATIVE HEART WITHOUT ANGINA PECTORIS: Status: ACTIVE | Noted: 2019-05-15

## 2019-05-15 PROBLEM — R26.81 UNSTEADY GAIT: Status: ACTIVE | Noted: 2019-05-15

## 2019-06-19 PROBLEM — R60.0 BILATERAL LEG EDEMA: Status: ACTIVE | Noted: 2019-06-19

## 2019-07-18 ENCOUNTER — NURSE ONLY (OUTPATIENT)
Dept: LAB | Age: 79
End: 2019-07-18
Attending: INTERNAL MEDICINE
Payer: MEDICARE

## 2019-07-18 DIAGNOSIS — E78.2 MIXED HYPERLIPIDEMIA: ICD-10-CM

## 2019-07-18 DIAGNOSIS — Z00.00 ENCOUNTER FOR INITIAL PREVENTIVE PHYSICAL EXAMINATION COVERED BY MEDICARE: ICD-10-CM

## 2019-07-18 DIAGNOSIS — I10 ESSENTIAL HYPERTENSION: ICD-10-CM

## 2019-07-18 LAB
ALBUMIN SERPL-MCNC: 3 G/DL (ref 3.4–5)
ALBUMIN/GLOB SERPL: 0.9 {RATIO} (ref 1–2)
ALP LIVER SERPL-CCNC: 88 U/L (ref 55–142)
ALT SERPL-CCNC: 21 U/L (ref 13–56)
ANION GAP SERPL CALC-SCNC: 6 MMOL/L (ref 0–18)
AST SERPL-CCNC: 20 U/L (ref 15–37)
BASOPHILS # BLD AUTO: 0.06 X10(3) UL (ref 0–0.2)
BASOPHILS NFR BLD AUTO: 0.8 %
BILIRUB SERPL-MCNC: 0.2 MG/DL (ref 0.1–2)
BUN BLD-MCNC: 12 MG/DL (ref 7–18)
BUN/CREAT SERPL: 18.2 (ref 10–20)
CALCIUM BLD-MCNC: 9.1 MG/DL (ref 8.5–10.1)
CHLORIDE SERPL-SCNC: 108 MMOL/L (ref 98–112)
CHOLEST SMN-MCNC: 163 MG/DL (ref ?–200)
CO2 SERPL-SCNC: 27 MMOL/L (ref 21–32)
CREAT BLD-MCNC: 0.66 MG/DL (ref 0.55–1.02)
DEPRECATED RDW RBC AUTO: 47 FL (ref 35.1–46.3)
EOSINOPHIL # BLD AUTO: 0.39 X10(3) UL (ref 0–0.7)
EOSINOPHIL NFR BLD AUTO: 5 %
ERYTHROCYTE [DISTWIDTH] IN BLOOD BY AUTOMATED COUNT: 14.7 % (ref 11–15)
EST. AVERAGE GLUCOSE BLD GHB EST-MCNC: 128 MG/DL (ref 68–126)
GLOBULIN PLAS-MCNC: 3.3 G/DL (ref 2.8–4.4)
GLUCOSE BLD-MCNC: 78 MG/DL (ref 70–99)
HBA1C MFR BLD HPLC: 6.1 % (ref ?–5.7)
HCT VFR BLD AUTO: 33.3 % (ref 35–48)
HDLC SERPL-MCNC: 78 MG/DL (ref 40–59)
HGB BLD-MCNC: 11.2 G/DL (ref 12–16)
IMM GRANULOCYTES # BLD AUTO: 0.03 X10(3) UL (ref 0–1)
IMM GRANULOCYTES NFR BLD: 0.4 %
LDLC SERPL CALC-MCNC: 72 MG/DL (ref ?–100)
LYMPHOCYTES # BLD AUTO: 2.38 X10(3) UL (ref 1–4)
LYMPHOCYTES NFR BLD AUTO: 30.4 %
M PROTEIN MFR SERPL ELPH: 6.3 G/DL (ref 6.4–8.2)
MCH RBC QN AUTO: 29.2 PG (ref 26–34)
MCHC RBC AUTO-ENTMCNC: 33.6 G/DL (ref 31–37)
MCV RBC AUTO: 86.7 FL (ref 80–100)
MONOCYTES # BLD AUTO: 0.75 X10(3) UL (ref 0.1–1)
MONOCYTES NFR BLD AUTO: 9.6 %
NEUTROPHILS # BLD AUTO: 4.21 X10 (3) UL (ref 1.5–7.7)
NEUTROPHILS # BLD AUTO: 4.21 X10(3) UL (ref 1.5–7.7)
NEUTROPHILS NFR BLD AUTO: 53.8 %
NONHDLC SERPL-MCNC: 85 MG/DL (ref ?–130)
OSMOLALITY SERPL CALC.SUM OF ELEC: 291 MOSM/KG (ref 275–295)
PLATELET # BLD AUTO: 185 10(3)UL (ref 150–450)
POTASSIUM SERPL-SCNC: 3.7 MMOL/L (ref 3.5–5.1)
RBC # BLD AUTO: 3.84 X10(6)UL (ref 3.8–5.3)
SODIUM SERPL-SCNC: 141 MMOL/L (ref 136–145)
T3FREE SERPL-MCNC: 2.15 PG/ML (ref 2.4–4.2)
T4 FREE SERPL-MCNC: 1.2 NG/DL (ref 0.8–1.7)
TRIGL SERPL-MCNC: 67 MG/DL (ref 30–149)
TSI SER-ACNC: 0.25 MIU/ML (ref 0.36–3.74)
VIT D+METAB SERPL-MCNC: 18.3 NG/ML (ref 30–100)
VLDLC SERPL CALC-MCNC: 13 MG/DL (ref 0–30)
WBC # BLD AUTO: 7.8 X10(3) UL (ref 4–11)

## 2019-07-18 PROCEDURE — 82306 VITAMIN D 25 HYDROXY: CPT

## 2019-07-18 PROCEDURE — 84481 FREE ASSAY (FT-3): CPT

## 2019-07-18 PROCEDURE — 85025 COMPLETE CBC W/AUTO DIFF WBC: CPT

## 2019-07-18 PROCEDURE — 80053 COMPREHEN METABOLIC PANEL: CPT

## 2019-07-18 PROCEDURE — 84443 ASSAY THYROID STIM HORMONE: CPT

## 2019-07-18 PROCEDURE — 36415 COLL VENOUS BLD VENIPUNCTURE: CPT

## 2019-07-18 PROCEDURE — 80061 LIPID PANEL: CPT

## 2019-07-18 PROCEDURE — 84439 ASSAY OF FREE THYROXINE: CPT

## 2019-07-18 PROCEDURE — 83036 HEMOGLOBIN GLYCOSYLATED A1C: CPT

## 2019-08-27 ENCOUNTER — APPOINTMENT (OUTPATIENT)
Dept: LAB | Age: 79
End: 2019-08-27
Attending: INTERNAL MEDICINE
Payer: MEDICARE

## 2019-08-27 DIAGNOSIS — R56.9 CONVULSIONS, UNSPECIFIED CONVULSION TYPE (HCC): ICD-10-CM

## 2019-08-27 LAB — PHENYTOIN SERPL-MCNC: 16.7 UG/ML (ref 10–20)

## 2019-08-27 PROCEDURE — 36415 COLL VENOUS BLD VENIPUNCTURE: CPT

## 2019-08-27 PROCEDURE — 80185 ASSAY OF PHENYTOIN TOTAL: CPT

## 2019-08-29 PROBLEM — E55.9 VITAMIN D DEFICIENCY: Status: ACTIVE | Noted: 2019-08-29

## 2019-10-29 ENCOUNTER — APPOINTMENT (OUTPATIENT)
Dept: LAB | Age: 79
End: 2019-10-29
Attending: INTERNAL MEDICINE
Payer: MEDICARE

## 2019-10-29 DIAGNOSIS — E03.9 HYPOTHYROIDISM, UNSPECIFIED TYPE: ICD-10-CM

## 2019-10-29 PROCEDURE — 84443 ASSAY THYROID STIM HORMONE: CPT

## 2019-10-29 PROCEDURE — 36415 COLL VENOUS BLD VENIPUNCTURE: CPT

## 2019-11-12 ENCOUNTER — NURSE ONLY (OUTPATIENT)
Dept: LAB | Age: 79
End: 2019-11-12
Attending: INTERNAL MEDICINE
Payer: MEDICARE

## 2019-11-12 DIAGNOSIS — R35.0 URINARY FREQUENCY: ICD-10-CM

## 2019-11-12 PROCEDURE — 87077 CULTURE AEROBIC IDENTIFY: CPT

## 2019-11-12 PROCEDURE — 87086 URINE CULTURE/COLONY COUNT: CPT

## 2019-11-12 PROCEDURE — 87186 SC STD MICRODIL/AGAR DIL: CPT

## 2019-11-12 PROCEDURE — 81001 URINALYSIS AUTO W/SCOPE: CPT

## 2019-11-26 ENCOUNTER — APPOINTMENT (OUTPATIENT)
Dept: LAB | Age: 79
End: 2019-11-26
Attending: INTERNAL MEDICINE
Payer: MEDICARE

## 2019-11-26 DIAGNOSIS — G40.909 SEIZURE DISORDER (HCC): ICD-10-CM

## 2019-11-26 PROCEDURE — 36415 COLL VENOUS BLD VENIPUNCTURE: CPT

## 2019-11-26 PROCEDURE — 80185 ASSAY OF PHENYTOIN TOTAL: CPT

## 2019-12-04 PROBLEM — M17.0 OSTEOARTHRITIS OF BOTH KNEES: Status: ACTIVE | Noted: 2019-12-04

## 2019-12-19 ENCOUNTER — APPOINTMENT (OUTPATIENT)
Dept: LAB | Age: 79
End: 2019-12-19
Attending: INTERNAL MEDICINE
Payer: MEDICARE

## 2019-12-19 DIAGNOSIS — I10 ESSENTIAL HYPERTENSION: ICD-10-CM

## 2019-12-19 DIAGNOSIS — R35.0 URINARY FREQUENCY: ICD-10-CM

## 2019-12-19 PROCEDURE — 85027 COMPLETE CBC AUTOMATED: CPT

## 2019-12-19 PROCEDURE — 80053 COMPREHEN METABOLIC PANEL: CPT

## 2019-12-19 PROCEDURE — 81001 URINALYSIS AUTO W/SCOPE: CPT

## 2019-12-19 PROCEDURE — 36415 COLL VENOUS BLD VENIPUNCTURE: CPT

## 2020-01-30 ENCOUNTER — APPOINTMENT (OUTPATIENT)
Dept: LAB | Age: 80
End: 2020-01-30
Attending: INTERNAL MEDICINE
Payer: MEDICARE

## 2020-01-30 DIAGNOSIS — E03.9 HYPOTHYROIDISM, UNSPECIFIED TYPE: ICD-10-CM

## 2020-01-30 LAB — TSI SER-ACNC: 0.44 MIU/ML (ref 0.36–3.74)

## 2020-01-30 PROCEDURE — 84443 ASSAY THYROID STIM HORMONE: CPT

## 2020-01-30 PROCEDURE — 36415 COLL VENOUS BLD VENIPUNCTURE: CPT

## 2020-02-27 ENCOUNTER — APPOINTMENT (OUTPATIENT)
Dept: LAB | Age: 80
End: 2020-02-27
Attending: INTERNAL MEDICINE
Payer: MEDICARE

## 2020-02-27 DIAGNOSIS — R56.9 CONVULSIONS, UNSPECIFIED CONVULSION TYPE (HCC): ICD-10-CM

## 2020-02-27 PROBLEM — L40.9 PSORIASIS: Status: ACTIVE | Noted: 2020-02-27

## 2020-02-27 PROBLEM — H91.92 HEARING LOSS OF LEFT EAR: Status: ACTIVE | Noted: 2020-02-27

## 2020-02-27 LAB — PHENYTOIN SERPL-MCNC: 16.9 UG/ML (ref 10–20)

## 2020-02-27 PROCEDURE — 36415 COLL VENOUS BLD VENIPUNCTURE: CPT

## 2020-02-27 PROCEDURE — 80185 ASSAY OF PHENYTOIN TOTAL: CPT

## 2020-04-07 ENCOUNTER — APPOINTMENT (OUTPATIENT)
Dept: LAB | Age: 80
End: 2020-04-07
Attending: INTERNAL MEDICINE
Payer: MEDICARE

## 2020-04-07 DIAGNOSIS — R56.9 CONVULSIONS, UNSPECIFIED CONVULSION TYPE (HCC): ICD-10-CM

## 2020-04-07 PROCEDURE — 80185 ASSAY OF PHENYTOIN TOTAL: CPT

## 2020-04-07 PROCEDURE — 36415 COLL VENOUS BLD VENIPUNCTURE: CPT

## 2020-04-09 ENCOUNTER — APPOINTMENT (OUTPATIENT)
Dept: LAB | Age: 80
End: 2020-04-09
Attending: INTERNAL MEDICINE
Payer: MEDICARE

## 2020-04-09 DIAGNOSIS — R35.0 URINARY FREQUENCY: ICD-10-CM

## 2020-04-09 PROCEDURE — 81003 URINALYSIS AUTO W/O SCOPE: CPT

## 2020-04-23 ENCOUNTER — APPOINTMENT (OUTPATIENT)
Dept: LAB | Age: 80
End: 2020-04-23
Attending: INTERNAL MEDICINE
Payer: MEDICARE

## 2020-04-23 DIAGNOSIS — I10 ESSENTIAL HYPERTENSION: ICD-10-CM

## 2020-04-23 DIAGNOSIS — E78.2 MIXED HYPERLIPIDEMIA: ICD-10-CM

## 2020-04-23 DIAGNOSIS — E03.9 HYPOTHYROIDISM, UNSPECIFIED TYPE: ICD-10-CM

## 2020-04-23 DIAGNOSIS — I12.9 RENAL DISEASE, HYPERTENSIVE BENIGN WITH CHRONIC KIDNEY DISEASE, STAGE 1-4 OR UNSPECIFIED CHRONIC KIDNEY DISEASE: ICD-10-CM

## 2020-04-23 DIAGNOSIS — E55.9 VITAMIN D DEFICIENCY: ICD-10-CM

## 2020-04-23 PROCEDURE — 83036 HEMOGLOBIN GLYCOSYLATED A1C: CPT

## 2020-04-23 PROCEDURE — 80053 COMPREHEN METABOLIC PANEL: CPT

## 2020-04-23 PROCEDURE — 82306 VITAMIN D 25 HYDROXY: CPT

## 2020-04-23 PROCEDURE — 82607 VITAMIN B-12: CPT

## 2020-04-23 PROCEDURE — 85027 COMPLETE CBC AUTOMATED: CPT

## 2020-04-23 PROCEDURE — 36415 COLL VENOUS BLD VENIPUNCTURE: CPT

## 2020-04-23 PROCEDURE — 80061 LIPID PANEL: CPT

## 2020-04-23 PROCEDURE — 84443 ASSAY THYROID STIM HORMONE: CPT

## 2020-07-16 ENCOUNTER — NURSE ONLY (OUTPATIENT)
Dept: LAB | Age: 80
End: 2020-07-16
Attending: INTERNAL MEDICINE
Payer: MEDICARE

## 2020-07-16 DIAGNOSIS — R35.0 URINARY FREQUENCY: ICD-10-CM

## 2020-07-16 DIAGNOSIS — R30.0 BURNING WITH URINATION: ICD-10-CM

## 2020-07-16 LAB
BILIRUB UR QL STRIP.AUTO: NEGATIVE
COLOR UR AUTO: YELLOW
GLUCOSE UR STRIP.AUTO-MCNC: NEGATIVE MG/DL
KETONES UR STRIP.AUTO-MCNC: NEGATIVE MG/DL
NITRITE UR QL STRIP.AUTO: POSITIVE
PH UR STRIP.AUTO: 6 [PH] (ref 4.5–8)
PROT UR STRIP.AUTO-MCNC: NEGATIVE MG/DL
SP GR UR STRIP.AUTO: 1.01 (ref 1–1.03)
UROBILINOGEN UR STRIP.AUTO-MCNC: <2 MG/DL
WBC #/AREA URNS AUTO: >50 /HPF
WBC CLUMPS UR QL AUTO: PRESENT

## 2020-07-16 PROCEDURE — 81001 URINALYSIS AUTO W/SCOPE: CPT

## 2020-07-16 PROCEDURE — 87086 URINE CULTURE/COLONY COUNT: CPT

## 2020-07-16 PROCEDURE — 87077 CULTURE AEROBIC IDENTIFY: CPT

## 2020-07-16 PROCEDURE — 87186 SC STD MICRODIL/AGAR DIL: CPT

## 2020-07-23 ENCOUNTER — APPOINTMENT (OUTPATIENT)
Dept: LAB | Age: 80
End: 2020-07-23
Attending: INTERNAL MEDICINE
Payer: MEDICARE

## 2020-07-23 DIAGNOSIS — I10 ESSENTIAL HYPERTENSION: ICD-10-CM

## 2020-07-23 DIAGNOSIS — Z87.440 HISTORY OF UTI: ICD-10-CM

## 2020-07-23 LAB
ALBUMIN SERPL-MCNC: 3 G/DL (ref 3.4–5)
ALBUMIN/GLOB SERPL: 0.8 {RATIO} (ref 1–2)
ALP LIVER SERPL-CCNC: 99 U/L (ref 55–142)
ALT SERPL-CCNC: 21 U/L (ref 13–56)
ANION GAP SERPL CALC-SCNC: 3 MMOL/L (ref 0–18)
AST SERPL-CCNC: 19 U/L (ref 15–37)
BILIRUB SERPL-MCNC: 0.2 MG/DL (ref 0.1–2)
BUN BLD-MCNC: 11 MG/DL (ref 7–18)
BUN/CREAT SERPL: 16.9 (ref 10–20)
CALCIUM BLD-MCNC: 8.7 MG/DL (ref 8.5–10.1)
CHLORIDE SERPL-SCNC: 109 MMOL/L (ref 98–112)
CO2 SERPL-SCNC: 28 MMOL/L (ref 21–32)
CREAT BLD-MCNC: 0.65 MG/DL (ref 0.55–1.02)
DEPRECATED RDW RBC AUTO: 45.1 FL (ref 35.1–46.3)
ERYTHROCYTE [DISTWIDTH] IN BLOOD BY AUTOMATED COUNT: 14 % (ref 11–15)
GLOBULIN PLAS-MCNC: 3.6 G/DL (ref 2.8–4.4)
GLUCOSE BLD-MCNC: 80 MG/DL (ref 70–99)
HCT VFR BLD AUTO: 35.8 % (ref 35–48)
HGB BLD-MCNC: 11.5 G/DL (ref 12–16)
M PROTEIN MFR SERPL ELPH: 6.6 G/DL (ref 6.4–8.2)
MCH RBC QN AUTO: 28.6 PG (ref 26–34)
MCHC RBC AUTO-ENTMCNC: 32.1 G/DL (ref 31–37)
MCV RBC AUTO: 89.1 FL (ref 80–100)
OSMOLALITY SERPL CALC.SUM OF ELEC: 288 MOSM/KG (ref 275–295)
PATIENT FASTING Y/N/NP: YES
PLATELET # BLD AUTO: 200 10(3)UL (ref 150–450)
POTASSIUM SERPL-SCNC: 3.9 MMOL/L (ref 3.5–5.1)
RBC # BLD AUTO: 4.02 X10(6)UL (ref 3.8–5.3)
SODIUM SERPL-SCNC: 140 MMOL/L (ref 136–145)
WBC # BLD AUTO: 8.4 X10(3) UL (ref 4–11)

## 2020-07-23 PROCEDURE — 80053 COMPREHEN METABOLIC PANEL: CPT

## 2020-07-23 PROCEDURE — 36415 COLL VENOUS BLD VENIPUNCTURE: CPT

## 2020-07-23 PROCEDURE — 85027 COMPLETE CBC AUTOMATED: CPT

## 2020-10-08 ENCOUNTER — NURSE ONLY (OUTPATIENT)
Dept: LAB | Age: 80
End: 2020-10-08
Attending: INTERNAL MEDICINE
Payer: MEDICARE

## 2020-10-08 DIAGNOSIS — Z87.440 PERSONAL HISTORY OF URINARY TRACT INFECTION: ICD-10-CM

## 2020-10-08 PROCEDURE — 81003 URINALYSIS AUTO W/O SCOPE: CPT

## 2021-03-05 DIAGNOSIS — Z23 NEED FOR VACCINATION: ICD-10-CM

## 2021-03-17 ENCOUNTER — NURSE ONLY (OUTPATIENT)
Dept: LAB | Age: 81
End: 2021-03-17
Attending: INTERNAL MEDICINE
Payer: MEDICARE

## 2021-03-17 DIAGNOSIS — R35.0 INCREASED URINARY FREQUENCY: ICD-10-CM

## 2021-03-17 PROCEDURE — 81001 URINALYSIS AUTO W/SCOPE: CPT

## 2021-03-18 LAB
BILIRUB UR QL STRIP.AUTO: NEGATIVE
CLARITY UR REFRACT.AUTO: CLEAR
COLOR UR AUTO: YELLOW
GLUCOSE UR STRIP.AUTO-MCNC: NEGATIVE MG/DL
HYALINE CASTS #/AREA URNS AUTO: PRESENT /LPF
KETONES UR STRIP.AUTO-MCNC: NEGATIVE MG/DL
NITRITE UR QL STRIP.AUTO: NEGATIVE
PH UR STRIP.AUTO: 6 [PH] (ref 5–8)
PROT UR STRIP.AUTO-MCNC: NEGATIVE MG/DL
RBC UR QL AUTO: NEGATIVE
SP GR UR STRIP.AUTO: 1.01 (ref 1–1.03)
UROBILINOGEN UR STRIP.AUTO-MCNC: <2 MG/DL

## 2021-04-21 PROBLEM — N39.46 MIXED STRESS AND URGE INCONTINENCE: Status: ACTIVE | Noted: 2021-04-21

## 2021-05-11 ENCOUNTER — NURSE ONLY (OUTPATIENT)
Dept: LAB | Age: 81
End: 2021-05-11
Attending: INTERNAL MEDICINE
Payer: MEDICARE

## 2021-05-11 DIAGNOSIS — F03.90 DEMENTIA WITHOUT BEHAVIORAL DISTURBANCE, UNSPECIFIED DEMENTIA TYPE (HCC): ICD-10-CM

## 2021-05-11 DIAGNOSIS — E55.9 VITAMIN D DEFICIENCY: ICD-10-CM

## 2021-05-11 DIAGNOSIS — I10 ESSENTIAL HYPERTENSION: ICD-10-CM

## 2021-05-11 DIAGNOSIS — G40.909 SEIZURE DISORDER (HCC): ICD-10-CM

## 2021-05-11 DIAGNOSIS — M17.0 OSTEOARTHRITIS OF BOTH KNEES, UNSPECIFIED OSTEOARTHRITIS TYPE: ICD-10-CM

## 2021-05-11 DIAGNOSIS — R56.9 CONVULSIONS, UNSPECIFIED CONVULSION TYPE (HCC): ICD-10-CM

## 2021-05-11 DIAGNOSIS — E03.9 HYPOTHYROIDISM, UNSPECIFIED TYPE: ICD-10-CM

## 2021-05-11 DIAGNOSIS — E78.2 MIXED HYPERLIPIDEMIA: ICD-10-CM

## 2021-05-11 PROCEDURE — 83550 IRON BINDING TEST: CPT

## 2021-05-11 PROCEDURE — 83036 HEMOGLOBIN GLYCOSYLATED A1C: CPT

## 2021-05-11 PROCEDURE — 36415 COLL VENOUS BLD VENIPUNCTURE: CPT

## 2021-05-11 PROCEDURE — 85027 COMPLETE CBC AUTOMATED: CPT

## 2021-05-11 PROCEDURE — 82607 VITAMIN B-12: CPT

## 2021-05-11 PROCEDURE — 80061 LIPID PANEL: CPT

## 2021-05-11 PROCEDURE — 80185 ASSAY OF PHENYTOIN TOTAL: CPT

## 2021-05-11 PROCEDURE — 80053 COMPREHEN METABOLIC PANEL: CPT

## 2021-05-11 PROCEDURE — 82728 ASSAY OF FERRITIN: CPT

## 2021-05-11 PROCEDURE — 82746 ASSAY OF FOLIC ACID SERUM: CPT

## 2021-05-11 PROCEDURE — 82306 VITAMIN D 25 HYDROXY: CPT

## 2021-05-11 PROCEDURE — 84443 ASSAY THYROID STIM HORMONE: CPT

## 2021-05-11 PROCEDURE — 83540 ASSAY OF IRON: CPT

## 2021-05-18 ENCOUNTER — LAB ENCOUNTER (OUTPATIENT)
Dept: LAB | Age: 81
End: 2021-05-18
Attending: INTERNAL MEDICINE
Payer: MEDICARE

## 2021-05-18 DIAGNOSIS — R35.0 URINARY FREQUENCY: ICD-10-CM

## 2021-05-18 PROCEDURE — 87086 URINE CULTURE/COLONY COUNT: CPT

## 2021-05-18 PROCEDURE — 87186 SC STD MICRODIL/AGAR DIL: CPT

## 2021-05-18 PROCEDURE — 87077 CULTURE AEROBIC IDENTIFY: CPT

## 2021-05-18 PROCEDURE — 81001 URINALYSIS AUTO W/SCOPE: CPT

## 2021-06-22 ENCOUNTER — NURSE ONLY (OUTPATIENT)
Dept: LAB | Age: 81
End: 2021-06-22
Attending: INTERNAL MEDICINE
Payer: MEDICARE

## 2021-06-22 DIAGNOSIS — R35.0 URINARY FREQUENCY: ICD-10-CM

## 2021-06-22 PROCEDURE — 81001 URINALYSIS AUTO W/SCOPE: CPT

## 2021-06-22 PROCEDURE — 87077 CULTURE AEROBIC IDENTIFY: CPT

## 2021-06-22 PROCEDURE — 87186 SC STD MICRODIL/AGAR DIL: CPT

## 2021-06-22 PROCEDURE — 87086 URINE CULTURE/COLONY COUNT: CPT

## 2021-07-28 PROBLEM — R73.03 PREDIABETES: Status: ACTIVE | Noted: 2021-07-28

## 2021-09-21 PROBLEM — G40.219 PARTIAL SYMPTOMATIC EPILEPSY WITH COMPLEX PARTIAL SEIZURES, INTRACTABLE, WITHOUT STATUS EPILEPTICUS (HCC): Status: ACTIVE | Noted: 2021-09-21

## 2021-09-21 PROBLEM — Z51.81 MEDICATION MONITORING ENCOUNTER: Status: ACTIVE | Noted: 2021-09-21

## 2021-10-05 ENCOUNTER — NURSE ONLY (OUTPATIENT)
Dept: LAB | Age: 81
End: 2021-10-05
Attending: INTERNAL MEDICINE
Payer: MEDICARE

## 2021-10-05 DIAGNOSIS — M17.0 OSTEOARTHRITIS OF BOTH KNEES, UNSPECIFIED OSTEOARTHRITIS TYPE: ICD-10-CM

## 2021-10-05 DIAGNOSIS — R56.9 CONVULSIONS, UNSPECIFIED CONVULSION TYPE (HCC): ICD-10-CM

## 2021-10-05 DIAGNOSIS — R56.9 SEIZURE (HCC): ICD-10-CM

## 2021-10-05 DIAGNOSIS — I10 ESSENTIAL HYPERTENSION: ICD-10-CM

## 2021-10-05 PROCEDURE — 85027 COMPLETE CBC AUTOMATED: CPT

## 2021-10-05 PROCEDURE — 36415 COLL VENOUS BLD VENIPUNCTURE: CPT

## 2021-10-05 PROCEDURE — 80339 ANTIEPILEPTICS NOS 1-3: CPT

## 2021-10-05 PROCEDURE — 80048 BASIC METABOLIC PNL TOTAL CA: CPT

## 2021-10-05 PROCEDURE — 80185 ASSAY OF PHENYTOIN TOTAL: CPT

## 2021-10-27 PROBLEM — N32.81 OAB (OVERACTIVE BLADDER): Status: ACTIVE | Noted: 2021-10-27

## 2021-10-27 PROBLEM — K59.09 CHRONIC CONSTIPATION: Status: ACTIVE | Noted: 2021-10-27

## 2021-12-20 PROBLEM — L98.491: Status: ACTIVE | Noted: 2021-12-20

## 2021-12-20 PROBLEM — R41.89 ALTERATION IN COGNITION: Status: ACTIVE | Noted: 2021-12-20

## 2021-12-30 ENCOUNTER — NURSE ONLY (OUTPATIENT)
Dept: LAB | Age: 81
End: 2021-12-30
Attending: INTERNAL MEDICINE
Payer: MEDICARE

## 2021-12-30 DIAGNOSIS — R30.0 BURNING WITH URINATION: ICD-10-CM

## 2021-12-30 LAB
BILIRUB UR QL STRIP.AUTO: NEGATIVE
COLOR UR AUTO: YELLOW
GLUCOSE UR STRIP.AUTO-MCNC: NEGATIVE MG/DL
KETONES UR STRIP.AUTO-MCNC: NEGATIVE MG/DL
NITRITE UR QL STRIP.AUTO: POSITIVE
PH UR STRIP.AUTO: 6 [PH] (ref 5–8)
PROT UR STRIP.AUTO-MCNC: 100 MG/DL
SP GR UR STRIP.AUTO: 1.01 (ref 1–1.03)
UROBILINOGEN UR STRIP.AUTO-MCNC: <2 MG/DL
WBC #/AREA URNS AUTO: >50 /HPF
WBC CLUMPS UR QL AUTO: PRESENT /HPF

## 2021-12-30 PROCEDURE — 87186 SC STD MICRODIL/AGAR DIL: CPT

## 2021-12-30 PROCEDURE — 87086 URINE CULTURE/COLONY COUNT: CPT

## 2021-12-30 PROCEDURE — 81001 URINALYSIS AUTO W/SCOPE: CPT

## 2021-12-30 PROCEDURE — 87077 CULTURE AEROBIC IDENTIFY: CPT

## 2022-01-20 ENCOUNTER — NURSE ONLY (OUTPATIENT)
Dept: LAB | Age: 82
End: 2022-01-20
Attending: INTERNAL MEDICINE
Payer: MEDICARE

## 2022-01-20 DIAGNOSIS — R60.0 BILATERAL LEG EDEMA: ICD-10-CM

## 2022-01-20 DIAGNOSIS — I10 ESSENTIAL HYPERTENSION: ICD-10-CM

## 2022-01-20 LAB
ALBUMIN SERPL-MCNC: 3.1 G/DL (ref 3.4–5)
ALBUMIN/GLOB SERPL: 1.1 {RATIO} (ref 1–2)
ALP LIVER SERPL-CCNC: 93 U/L
ALT SERPL-CCNC: 20 U/L
ANION GAP SERPL CALC-SCNC: 6 MMOL/L (ref 0–18)
AST SERPL-CCNC: 20 U/L (ref 15–37)
BILIRUB SERPL-MCNC: 0.3 MG/DL (ref 0.1–2)
BUN BLD-MCNC: 7 MG/DL (ref 7–18)
CALCIUM BLD-MCNC: 8.7 MG/DL (ref 8.5–10.1)
CHLORIDE SERPL-SCNC: 108 MMOL/L (ref 98–112)
CO2 SERPL-SCNC: 27 MMOL/L (ref 21–32)
CREAT BLD-MCNC: 0.53 MG/DL
ERYTHROCYTE [DISTWIDTH] IN BLOOD BY AUTOMATED COUNT: 12.7 %
FASTING STATUS PATIENT QL REPORTED: YES
GLOBULIN PLAS-MCNC: 2.9 G/DL (ref 2.8–4.4)
GLUCOSE BLD-MCNC: 83 MG/DL (ref 70–99)
HCT VFR BLD AUTO: 37.7 %
HGB BLD-MCNC: 12.5 G/DL
MCH RBC QN AUTO: 32.6 PG (ref 26–34)
MCHC RBC AUTO-ENTMCNC: 33.2 G/DL (ref 31–37)
MCV RBC AUTO: 98.2 FL
OSMOLALITY SERPL CALC.SUM OF ELEC: 289 MOSM/KG (ref 275–295)
PLATELET # BLD AUTO: 173 10(3)UL (ref 150–450)
POTASSIUM SERPL-SCNC: 4.4 MMOL/L (ref 3.5–5.1)
PROT SERPL-MCNC: 6 G/DL (ref 6.4–8.2)
RBC # BLD AUTO: 3.84 X10(6)UL
SODIUM SERPL-SCNC: 141 MMOL/L (ref 136–145)
WBC # BLD AUTO: 6.8 X10(3) UL (ref 4–11)

## 2022-01-20 PROCEDURE — 36415 COLL VENOUS BLD VENIPUNCTURE: CPT

## 2022-01-20 PROCEDURE — 80053 COMPREHEN METABOLIC PANEL: CPT

## 2022-01-20 PROCEDURE — 85027 COMPLETE CBC AUTOMATED: CPT

## 2022-05-24 ENCOUNTER — NURSE ONLY (OUTPATIENT)
Dept: LAB | Age: 82
End: 2022-05-24
Attending: INTERNAL MEDICINE
Payer: MEDICARE

## 2022-05-24 DIAGNOSIS — R56.9 CONVULSIONS, UNSPECIFIED CONVULSION TYPE (HCC): ICD-10-CM

## 2022-05-24 LAB — PHENYTOIN SERPL-MCNC: 16.6 UG/ML (ref 10–20)

## 2022-05-24 PROCEDURE — 80339 ANTIEPILEPTICS NOS 1-3: CPT

## 2022-05-24 PROCEDURE — 80185 ASSAY OF PHENYTOIN TOTAL: CPT

## 2022-05-24 PROCEDURE — 36415 COLL VENOUS BLD VENIPUNCTURE: CPT

## 2022-05-25 PROBLEM — H25.13 AGE-RELATED NUCLEAR CATARACT OF BOTH EYES: Status: ACTIVE | Noted: 2022-03-01

## 2022-05-26 LAB — LACOSAMIDE, SERUM OR PLASMA: 2.8 UG/ML

## 2022-07-20 PROBLEM — R26.9 GAIT DISTURBANCE: Status: ACTIVE | Noted: 2019-05-15

## 2022-07-22 ENCOUNTER — HOSPITAL ENCOUNTER (EMERGENCY)
Facility: HOSPITAL | Age: 82
Discharge: HOME | End: 2022-07-22
Attending: EMERGENCY MEDICINE
Payer: MEDICARE

## 2022-07-22 ENCOUNTER — APPOINTMENT (OUTPATIENT)
Dept: CT IMAGING | Facility: HOSPITAL | Age: 82
End: 2022-07-22
Attending: EMERGENCY MEDICINE
Payer: MEDICARE

## 2022-07-22 VITALS
BODY MASS INDEX: 29.44 KG/M2 | WEIGHT: 160 LBS | OXYGEN SATURATION: 96 % | DIASTOLIC BLOOD PRESSURE: 84 MMHG | TEMPERATURE: 98 F | HEART RATE: 72 BPM | HEIGHT: 62 IN | SYSTOLIC BLOOD PRESSURE: 155 MMHG | RESPIRATION RATE: 14 BRPM
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
## 2022-07-22 DIAGNOSIS — S09.90XA INJURY OF HEAD, INITIAL ENCOUNTER: Primary | ICD-10-CM

## 2022-07-22 DIAGNOSIS — S00.03XA HEMATOMA OF SCALP, INITIAL ENCOUNTER: ICD-10-CM

## 2022-07-22 PROCEDURE — 70450 CT HEAD/BRAIN W/O DYE: CPT | Performed by: EMERGENCY MEDICINE

## 2022-07-22 PROCEDURE — 99284 EMERGENCY DEPT VISIT MOD MDM: CPT

## 2022-07-22 NOTE — ED PROVIDER NOTES
CT BRAIN OR HEAD ([id number])    Result Date: 7/22/2022  PROCEDURE:  CT BRAIN OR HEAD ([id number])  LOCATION:  [id number]  COMPARISON:  [facility] , CT, CT BRAIN OR HEAD ([id number]), 9/08/2018, 2:35 PM.  MR, MRI BRAIN (W+WO) (CPT=70553), 6/21/2018, 4:46 PM.  [facility] , CT, CT BRAIN OR HEAD ([id number]), 6/19/2018, 7:28 PM.  [facility] , CT, CT BRAIN OR HEAD ([id number]), 11/15/2018, 9:18 PM.  INDICATIONS:  fall from nursing home right perioribital bruising no LOC  TECHNIQUE:  Noncontrast CT scanning is performed through the brain. Dose reduction techniques were used. Dose information is transmitted to the [facility]) NRDR ([address]) which includes the Dose Index Registry. PATIENT STATED HISTORY: (As transcribed by Technologist)  Fall, hit right side of forehead. FINDINGS:  VENTRICLES/SULCI:  Mild to moderate atrophy without hydrocephalus. INTRACRANIAL:  There are no abnormal extraaxial fluid collections. There is no midline shift. There are no acute intraparenchymal brain abnormalities. There is nothing specific for acute infarct. There is no hemorrhage or mass lesion. There is minimal chronic microvascular disease in the white unchanged. SINUSES:           No sign of acute sinusitis. MASTOIDS:          No sign of acute inflammation. SKULL:             No evidence for fracture or osseous abnormality. OTHER:             Right frontal scalp hematoma. CONCLUSION:  1. No acute infarct or hemorrhage. Stable chronic changes. Dictated by (CST): Brent Boss MD on 7/22/2022 at 2:19 PM     Finalized by (CST): Brent Boss MD on 7/22/2022 at 2:24 PM     No evidence of acute intracranial trauma soft tissue external trauma noted. Patient made aware of CT results.   Patient discharged as per disposition

## 2022-08-09 ENCOUNTER — LAB ENCOUNTER (OUTPATIENT)
Dept: LAB | Age: 82
End: 2022-08-09
Attending: INTERNAL MEDICINE
Payer: MEDICARE

## 2022-09-07 PROCEDURE — 87086 URINE CULTURE/COLONY COUNT: CPT | Performed by: INTERNAL MEDICINE

## 2022-09-07 PROCEDURE — 81001 URINALYSIS AUTO W/SCOPE: CPT | Performed by: INTERNAL MEDICINE

## 2022-09-07 PROCEDURE — 81015 MICROSCOPIC EXAM OF URINE: CPT | Performed by: INTERNAL MEDICINE

## 2022-09-08 ENCOUNTER — LAB REQUISITION (OUTPATIENT)
Dept: LAB | Facility: HOSPITAL | Age: 82
End: 2022-09-08
Payer: MEDICARE

## 2022-09-08 DIAGNOSIS — R30.0 DYSURIA: ICD-10-CM

## 2022-09-08 LAB
BILIRUB UR QL STRIP.AUTO: NEGATIVE
CLARITY UR REFRACT.AUTO: CLEAR
COLOR UR AUTO: YELLOW
GLUCOSE UR STRIP.AUTO-MCNC: NEGATIVE MG/DL
KETONES UR STRIP.AUTO-MCNC: NEGATIVE MG/DL
NITRITE UR QL STRIP.AUTO: NEGATIVE
PH UR STRIP.AUTO: 6.5 [PH] (ref 5–8)
SP GR UR STRIP.AUTO: 1.01 (ref 1–1.03)
UROBILINOGEN UR STRIP.AUTO-MCNC: 0.2 MG/DL
WBC #/AREA URNS AUTO: >50 /HPF
WBC #/AREA URNS AUTO: >50 /HPF

## 2022-09-13 ENCOUNTER — LAB ENCOUNTER (OUTPATIENT)
Dept: LAB | Age: 82
End: 2022-09-13
Attending: INTERNAL MEDICINE

## 2022-10-19 ENCOUNTER — APPOINTMENT (OUTPATIENT)
Dept: CT IMAGING | Facility: HOSPITAL | Age: 82
End: 2022-10-19
Attending: EMERGENCY MEDICINE
Payer: MEDICARE

## 2022-10-19 ENCOUNTER — HOSPITAL ENCOUNTER (INPATIENT)
Facility: HOSPITAL | Age: 82
LOS: 1 days | Discharge: NURSING, REHAB OR LONG-TERM CARE | End: 2022-10-22
Attending: EMERGENCY MEDICINE | Admitting: INTERNAL MEDICINE
Payer: MEDICARE

## 2022-10-19 DIAGNOSIS — R55 SYNCOPE, NEAR: Primary | ICD-10-CM

## 2022-10-19 DIAGNOSIS — I95.1 ORTHOSTASIS: ICD-10-CM

## 2022-10-19 DIAGNOSIS — S09.90XA INJURY OF HEAD, INITIAL ENCOUNTER: ICD-10-CM

## 2022-10-19 LAB
ALBUMIN SERPL-MCNC: 3.6 G/DL (ref 3.4–5)
ALBUMIN/GLOB SERPL: 0.9 {RATIO} (ref 1–2)
ALP LIVER SERPL-CCNC: 91 U/L
ALT SERPL-CCNC: 23 U/L
ANION GAP SERPL CALC-SCNC: 5 MMOL/L (ref 0–18)
AST SERPL-CCNC: 29 U/L (ref 15–37)
ATRIAL RATE: 63 BPM
BASOPHILS # BLD AUTO: 0.07 X10(3) UL (ref 0–0.2)
BASOPHILS NFR BLD AUTO: 0.8 %
BILIRUB SERPL-MCNC: 0.2 MG/DL (ref 0.1–2)
BUN BLD-MCNC: 11 MG/DL (ref 7–18)
CALCIUM BLD-MCNC: 9.1 MG/DL (ref 8.5–10.1)
CHLORIDE SERPL-SCNC: 103 MMOL/L (ref 98–112)
CO2 SERPL-SCNC: 28 MMOL/L (ref 21–32)
CREAT BLD-MCNC: 0.94 MG/DL
EOSINOPHIL # BLD AUTO: 0.68 X10(3) UL (ref 0–0.7)
EOSINOPHIL NFR BLD AUTO: 7.4 %
ERYTHROCYTE [DISTWIDTH] IN BLOOD BY AUTOMATED COUNT: 12.3 %
GFR SERPLBLD BASED ON 1.73 SQ M-ARVRAT: 61 ML/MIN/1.73M2 (ref 60–?)
GLOBULIN PLAS-MCNC: 3.8 G/DL (ref 2.8–4.4)
GLUCOSE BLD-MCNC: 86 MG/DL (ref 70–99)
HCT VFR BLD AUTO: 39.5 %
HGB BLD-MCNC: 13.4 G/DL
IMM GRANULOCYTES # BLD AUTO: 0.04 X10(3) UL (ref 0–1)
IMM GRANULOCYTES NFR BLD: 0.4 %
LYMPHOCYTES # BLD AUTO: 2.19 X10(3) UL (ref 1–4)
LYMPHOCYTES NFR BLD AUTO: 23.9 %
MCH RBC QN AUTO: 32.4 PG (ref 26–34)
MCHC RBC AUTO-ENTMCNC: 33.9 G/DL (ref 31–37)
MCV RBC AUTO: 95.6 FL
MONOCYTES # BLD AUTO: 0.8 X10(3) UL (ref 0.1–1)
MONOCYTES NFR BLD AUTO: 8.7 %
NEUTROPHILS # BLD AUTO: 5.4 X10 (3) UL (ref 1.5–7.7)
NEUTROPHILS # BLD AUTO: 5.4 X10(3) UL (ref 1.5–7.7)
NEUTROPHILS NFR BLD AUTO: 58.8 %
OSMOLALITY SERPL CALC.SUM OF ELEC: 281 MOSM/KG (ref 275–295)
P AXIS: 46 DEGREES
P-R INTERVAL: 164 MS
PLATELET # BLD AUTO: 180 10(3)UL (ref 150–450)
POTASSIUM SERPL-SCNC: 5 MMOL/L (ref 3.5–5.1)
PROT SERPL-MCNC: 7.4 G/DL (ref 6.4–8.2)
Q-T INTERVAL: 406 MS
QRS DURATION: 84 MS
QTC CALCULATION (BEZET): 415 MS
R AXIS: -19 DEGREES
RBC # BLD AUTO: 4.13 X10(6)UL
SARS-COV-2 RNA RESP QL NAA+PROBE: NOT DETECTED
SODIUM SERPL-SCNC: 136 MMOL/L (ref 136–145)
T AXIS: 47 DEGREES
VENTRICULAR RATE: 63 BPM
WBC # BLD AUTO: 9.2 X10(3) UL (ref 4–11)

## 2022-10-19 PROCEDURE — 99285 EMERGENCY DEPT VISIT HI MDM: CPT

## 2022-10-19 PROCEDURE — 80053 COMPREHEN METABOLIC PANEL: CPT | Performed by: EMERGENCY MEDICINE

## 2022-10-19 PROCEDURE — 85025 COMPLETE CBC W/AUTO DIFF WBC: CPT | Performed by: EMERGENCY MEDICINE

## 2022-10-19 PROCEDURE — 96361 HYDRATE IV INFUSION ADD-ON: CPT

## 2022-10-19 PROCEDURE — 96360 HYDRATION IV INFUSION INIT: CPT

## 2022-10-19 PROCEDURE — 70450 CT HEAD/BRAIN W/O DYE: CPT | Performed by: EMERGENCY MEDICINE

## 2022-10-19 PROCEDURE — 72125 CT NECK SPINE W/O DYE: CPT | Performed by: EMERGENCY MEDICINE

## 2022-10-19 PROCEDURE — 93005 ELECTROCARDIOGRAM TRACING: CPT

## 2022-10-19 PROCEDURE — 93010 ELECTROCARDIOGRAM REPORT: CPT

## 2022-10-19 RX ORDER — SODIUM CHLORIDE 9 MG/ML
INJECTION, SOLUTION INTRAVENOUS CONTINUOUS
Status: DISCONTINUED | OUTPATIENT
Start: 2022-10-19 | End: 2022-10-22

## 2022-10-19 RX ORDER — PHENYTOIN SODIUM 100 MG/1
100 CAPSULE, EXTENDED RELEASE ORAL SEE ADMIN INSTRUCTIONS
Status: ON HOLD | COMMUNITY
End: 2022-10-20

## 2022-10-19 RX ORDER — ENOXAPARIN SODIUM 100 MG/ML
40 INJECTION SUBCUTANEOUS DAILY
Status: DISCONTINUED | OUTPATIENT
Start: 2022-10-20 | End: 2022-10-22

## 2022-10-19 RX ORDER — GABAPENTIN 300 MG/1
300 CAPSULE ORAL 2 TIMES DAILY
Status: DISCONTINUED | OUTPATIENT
Start: 2022-10-19 | End: 2022-10-22

## 2022-10-19 RX ORDER — CLONAZEPAM 0.5 MG/1
1 TABLET ORAL NIGHTLY
Status: DISCONTINUED | OUTPATIENT
Start: 2022-10-19 | End: 2022-10-22

## 2022-10-19 RX ORDER — PHENYTOIN SODIUM 100 MG/1
100 CAPSULE, EXTENDED RELEASE ORAL
Status: DISCONTINUED | OUTPATIENT
Start: 2022-10-20 | End: 2022-10-20

## 2022-10-19 RX ORDER — PHENYTOIN SODIUM 100 MG/1
200 CAPSULE, EXTENDED RELEASE ORAL NIGHTLY
Status: ON HOLD | COMMUNITY
End: 2022-10-20

## 2022-10-19 RX ORDER — ASPIRIN 81 MG/1
81 TABLET ORAL DAILY
Status: DISCONTINUED | OUTPATIENT
Start: 2022-10-20 | End: 2022-10-22

## 2022-10-19 RX ORDER — FLUTICASONE PROPIONATE 50 MCG
1 SPRAY, SUSPENSION (ML) NASAL EVERY 12 HOURS
Status: DISCONTINUED | OUTPATIENT
Start: 2022-10-19 | End: 2022-10-22

## 2022-10-19 RX ORDER — HYDROCODONE BITARTRATE AND ACETAMINOPHEN 5; 325 MG/1; MG/1
1 TABLET ORAL EVERY 4 HOURS PRN
Status: DISCONTINUED | OUTPATIENT
Start: 2022-10-19 | End: 2022-10-22

## 2022-10-19 RX ORDER — POLYETHYLENE GLYCOL 3350 17 G/17G
17 POWDER, FOR SOLUTION ORAL DAILY PRN
Status: DISCONTINUED | OUTPATIENT
Start: 2022-10-19 | End: 2022-10-22

## 2022-10-19 RX ORDER — ACETAMINOPHEN 325 MG/1
650 TABLET ORAL EVERY 4 HOURS PRN
Status: DISCONTINUED | OUTPATIENT
Start: 2022-10-19 | End: 2022-10-22

## 2022-10-19 RX ORDER — HYDROCODONE BITARTRATE AND ACETAMINOPHEN 5; 325 MG/1; MG/1
2 TABLET ORAL EVERY 4 HOURS PRN
Status: DISCONTINUED | OUTPATIENT
Start: 2022-10-19 | End: 2022-10-22

## 2022-10-19 RX ORDER — LACOSAMIDE 100 MG/1
100 TABLET ORAL 2 TIMES DAILY
Status: DISCONTINUED | OUTPATIENT
Start: 2022-10-19 | End: 2022-10-22

## 2022-10-19 RX ORDER — FOLIC ACID 1 MG/1
1 TABLET ORAL DAILY
Status: DISCONTINUED | OUTPATIENT
Start: 2022-10-19 | End: 2022-10-22

## 2022-10-19 RX ORDER — DOCUSATE SODIUM 100 MG/1
100 CAPSULE, LIQUID FILLED ORAL 2 TIMES DAILY
Status: DISCONTINUED | OUTPATIENT
Start: 2022-10-19 | End: 2022-10-22

## 2022-10-19 RX ORDER — POLYVINYL ALCOHOL 14 MG/ML
2 SOLUTION/ DROPS OPHTHALMIC 4 TIMES DAILY PRN
Status: DISCONTINUED | OUTPATIENT
Start: 2022-10-19 | End: 2022-10-22

## 2022-10-19 RX ORDER — PHENYTOIN SODIUM 100 MG/1
200 CAPSULE, EXTENDED RELEASE ORAL
Status: DISCONTINUED | OUTPATIENT
Start: 2022-10-21 | End: 2022-10-20

## 2022-10-19 RX ORDER — PHENYTOIN SODIUM 100 MG/1
200 CAPSULE, EXTENDED RELEASE ORAL NIGHTLY
Status: DISCONTINUED | OUTPATIENT
Start: 2022-10-19 | End: 2022-10-22

## 2022-10-19 RX ORDER — FAMOTIDINE 20 MG/1
20 TABLET, FILM COATED ORAL DAILY
Status: DISCONTINUED | OUTPATIENT
Start: 2022-10-19 | End: 2022-10-22

## 2022-10-19 RX ORDER — ATORVASTATIN CALCIUM 20 MG/1
20 TABLET, FILM COATED ORAL NIGHTLY
Status: DISCONTINUED | OUTPATIENT
Start: 2022-10-19 | End: 2022-10-22

## 2022-10-19 RX ORDER — PHENYTOIN SODIUM 100 MG/1
200 CAPSULE, EXTENDED RELEASE ORAL SEE ADMIN INSTRUCTIONS
Status: ON HOLD | COMMUNITY
End: 2022-10-20

## 2022-10-19 NOTE — PROGRESS NOTES
NURSING ADMISSION NOTE      Patient admitted via Cart  Oriented to room. Safety precautions initiated. Bed in low position. Call light in reach.     A/Ox4, on room air, NSR on tele  High fall risk, bed alarm on  Patient orientated to use of call light  IVs to left and right hand  0.9 nacl at 125ml/hr  Admission navigator completed with daughter at bedside   Patient and family updated with plan of care  Will continue to monitor

## 2022-10-19 NOTE — ED INITIAL ASSESSMENT (HPI)
Pt in per EMS from [facility]. + Syncope when reaching for cane. Patient was in elevator and hit L side of head. Arrives in C collar. No thinners.  A&Ox3 c/o feeling dizzy

## 2022-10-20 LAB
ANION GAP SERPL CALC-SCNC: 6 MMOL/L (ref 0–18)
BASOPHILS # BLD AUTO: 0.04 X10(3) UL (ref 0–0.2)
BASOPHILS NFR BLD AUTO: 0.3 %
BILIRUB UR QL STRIP.AUTO: NEGATIVE
BUN BLD-MCNC: 11 MG/DL (ref 7–18)
CALCIUM BLD-MCNC: 8.5 MG/DL (ref 8.5–10.1)
CHLORIDE SERPL-SCNC: 110 MMOL/L (ref 98–112)
CO2 SERPL-SCNC: 22 MMOL/L (ref 21–32)
COLOR UR AUTO: YELLOW
CREAT BLD-MCNC: 0.56 MG/DL
EOSINOPHIL # BLD AUTO: 0.04 X10(3) UL (ref 0–0.7)
EOSINOPHIL NFR BLD AUTO: 0.3 %
ERYTHROCYTE [DISTWIDTH] IN BLOOD BY AUTOMATED COUNT: 12.4 %
GFR SERPLBLD BASED ON 1.73 SQ M-ARVRAT: 92 ML/MIN/1.73M2 (ref 60–?)
GLUCOSE BLD-MCNC: 152 MG/DL (ref 70–99)
GLUCOSE UR STRIP.AUTO-MCNC: NEGATIVE MG/DL
HCT VFR BLD AUTO: 35.2 %
HGB BLD-MCNC: 12.5 G/DL
IMM GRANULOCYTES # BLD AUTO: 0.11 X10(3) UL (ref 0–1)
IMM GRANULOCYTES NFR BLD: 0.7 %
LYMPHOCYTES # BLD AUTO: 0.95 X10(3) UL (ref 1–4)
LYMPHOCYTES NFR BLD AUTO: 6 %
MCH RBC QN AUTO: 34 PG (ref 26–34)
MCHC RBC AUTO-ENTMCNC: 35.5 G/DL (ref 31–37)
MCV RBC AUTO: 95.7 FL
MONOCYTES # BLD AUTO: 0.87 X10(3) UL (ref 0.1–1)
MONOCYTES NFR BLD AUTO: 5.5 %
NEUTROPHILS # BLD AUTO: 13.75 X10 (3) UL (ref 1.5–7.7)
NEUTROPHILS # BLD AUTO: 13.75 X10(3) UL (ref 1.5–7.7)
NEUTROPHILS NFR BLD AUTO: 87.2 %
NITRITE UR QL STRIP.AUTO: NEGATIVE
OSMOLALITY SERPL CALC.SUM OF ELEC: 288 MOSM/KG (ref 275–295)
PH UR STRIP.AUTO: 6 [PH] (ref 5–8)
PHENYTOIN SERPL-MCNC: 18.4 UG/ML (ref 10–20)
PLATELET # BLD AUTO: 156 10(3)UL (ref 150–450)
POTASSIUM SERPL-SCNC: 3.6 MMOL/L (ref 3.5–5.1)
PROT UR STRIP.AUTO-MCNC: 30 MG/DL
RBC # BLD AUTO: 3.68 X10(6)UL
RBC #/AREA URNS AUTO: >10 /HPF
SODIUM SERPL-SCNC: 138 MMOL/L (ref 136–145)
SP GR UR STRIP.AUTO: 1.02 (ref 1–1.03)
UROBILINOGEN UR STRIP.AUTO-MCNC: <2 MG/DL
WBC # BLD AUTO: 15.8 X10(3) UL (ref 4–11)
WBC #/AREA URNS AUTO: >50 /HPF

## 2022-10-20 PROCEDURE — 87086 URINE CULTURE/COLONY COUNT: CPT | Performed by: INTERNAL MEDICINE

## 2022-10-20 PROCEDURE — 81001 URINALYSIS AUTO W/SCOPE: CPT | Performed by: INTERNAL MEDICINE

## 2022-10-20 PROCEDURE — 85025 COMPLETE CBC W/AUTO DIFF WBC: CPT | Performed by: INTERNAL MEDICINE

## 2022-10-20 PROCEDURE — 80185 ASSAY OF PHENYTOIN TOTAL: CPT | Performed by: INTERNAL MEDICINE

## 2022-10-20 PROCEDURE — 87186 SC STD MICRODIL/AGAR DIL: CPT | Performed by: INTERNAL MEDICINE

## 2022-10-20 PROCEDURE — 87077 CULTURE AEROBIC IDENTIFY: CPT | Performed by: INTERNAL MEDICINE

## 2022-10-20 PROCEDURE — 80048 BASIC METABOLIC PNL TOTAL CA: CPT | Performed by: INTERNAL MEDICINE

## 2022-10-20 RX ORDER — BISACODYL 10 MG
10 SUPPOSITORY, RECTAL RECTAL ONCE
Status: COMPLETED | OUTPATIENT
Start: 2022-10-20 | End: 2022-10-20

## 2022-10-20 RX ORDER — LACOSAMIDE 100 MG/1
100 TABLET ORAL 2 TIMES DAILY
COMMUNITY
End: 2022-10-23

## 2022-10-20 RX ORDER — PHENYTOIN SODIUM 200 MG/1
200 CAPSULE, EXTENDED RELEASE ORAL
COMMUNITY

## 2022-10-20 RX ORDER — ANORECTAL OINTMENT 15.7; .44; 24; 20.6 G/100G; G/100G; G/100G; G/100G
OINTMENT TOPICAL 2 TIMES DAILY
COMMUNITY
End: 2022-10-22

## 2022-10-20 RX ORDER — MECLIZINE HCL 12.5 MG/1
12.5 TABLET ORAL 3 TIMES DAILY PRN
Status: DISCONTINUED | OUTPATIENT
Start: 2022-10-20 | End: 2022-10-22

## 2022-10-20 RX ORDER — PHENYTOIN SODIUM 100 MG/1
100 CAPSULE, EXTENDED RELEASE ORAL
Status: DISCONTINUED | OUTPATIENT
Start: 2022-10-21 | End: 2022-10-22

## 2022-10-20 RX ORDER — PHENYTOIN SODIUM 100 MG/1
100 CAPSULE, EXTENDED RELEASE ORAL
COMMUNITY

## 2022-10-20 RX ORDER — PHENYTOIN SODIUM 200 MG/1
200 CAPSULE, EXTENDED RELEASE ORAL NIGHTLY
COMMUNITY

## 2022-10-20 RX ORDER — ACETAMINOPHEN 500 MG
1 TABLET ORAL DAILY
COMMUNITY

## 2022-10-20 RX ORDER — AMOXICILLIN 500 MG
1200 CAPSULE ORAL DAILY
COMMUNITY

## 2022-10-20 RX ORDER — PHENYTOIN SODIUM 100 MG/1
200 CAPSULE, EXTENDED RELEASE ORAL
Status: DISCONTINUED | OUTPATIENT
Start: 2022-10-23 | End: 2022-10-22

## 2022-10-20 NOTE — PLAN OF CARE
Assumed care 0730  AO x3, verbal, forgetful, weak  On room air  NSR on tele. Pt positive for Orthostatic BP. 122/77(lying), 97/75 (sitting), 67/55(standing)  Pt verbalized dizziness when standing. MD notified. Once back to bed, pt denied dizziness. ON IVF 0.9 @100/hr. BP slowing came back up to 115/61 as pt relaxed in bed  PT/OT to assess pt. Gait is unsteady when standing for BPs  Needs X2 assist for transfer. Pt has not voided since last shift. Bladder scan to be done with volume of 640. Pt straight cath with output of 700ml. Urine culture collected. Had BM x4 during shift. Pt verbalized feeling much better post BM    Md notified of all stated above.       Problem: Patient/Family Goals  Goal: Patient/Family Long Term Goal  Description: Patient's Long Term Goal:     Interventions:  -   - See additional Care Plan goals for specific interventions  Outcome: Progressing  Goal: Patient/Family Short Term Goal  Description: Patient's Short Term Goal:     Interventions:   -   - See additional Care Plan goals for specific interventions  Outcome: Progressing

## 2022-10-21 PROCEDURE — 97530 THERAPEUTIC ACTIVITIES: CPT

## 2022-10-21 PROCEDURE — 97162 PT EVAL MOD COMPLEX 30 MIN: CPT

## 2022-10-21 PROCEDURE — 97535 SELF CARE MNGMENT TRAINING: CPT

## 2022-10-21 PROCEDURE — 97165 OT EVAL LOW COMPLEX 30 MIN: CPT

## 2022-10-21 PROCEDURE — 97116 GAIT TRAINING THERAPY: CPT

## 2022-10-21 NOTE — PLAN OF CARE
Assumed patient care at 10:30. A/Ox3-4, forgetful at times. Room air. Normal sinus Rhythm on tele. Orthostatic Bps Q shift (+). Regular diet. Electrolyte protocol (cardiac). PT/OT evaluated recommend BURAK. PIV  R ac infusing Normal saline @ 100ml/hr. Bladder scan Q shift. All safety precautions are in place and will continue to monitor.     Problem: Patient/Family Goals  Goal: Patient/Family Long Term Goal  Description: Patient's Long Term Goal: Discharge    Interventions:  - MD Consults  - IV Fluids  - IV ABX    - See additional Care Plan goals for specific interventions  Outcome: Progressing  Goal: Patient/Family Short Term Goal  Description: Patient's Short Term Goal: Treat infection    Interventions:   - IV ABX  - Monitor urine output  - Bladder scan as needed  - See additional Care Plan goals for specific interventions  Outcome: Progressing

## 2022-10-21 NOTE — CM/SW NOTE
10/21/22 1600   CM/SW Referral Data   Referral Source Physician   Reason for Referral Discharge planning   Informant Patient;Daughter   Patient Info   Patient's Current Mental Status at Time of Assessment Alert;Confused or unable to complete assessment   Patient's Home Environment Supportive Living   Post Acute Care Provider Upon Admission [facility] [place] [facility]   Discharge Needs   Anticipated D/C needs Subacute rehab   Services Requested   PASRR Level 1 Submitted Yes     Sw met with pt yesterday and spoke to pt's daughter David Lam today. Pt [facility] from [facility] following a fall. PT/OT rec B[facility]AK. Daughter only wants referral made to [facility]. Referral sent in aidin and PASSR completed. Await acceptance by [facility],  Per Dr Anne Castellanos, possible Saturday/Sunday.     Norma Goldsmith LCSW  /Discharge Planner

## 2022-10-21 NOTE — PLAN OF CARE
Assumed patient care at [address] [place] [address]. A&O x3, forgetful, compliant . On 1L NC, SpO2 above 90%, denies SOB. NSR on tele. Orthostatic BP Qshift. Incontinent. Bladder scan PRN. UA came back (+). Culture still pending. IV Rocephin started. Has been having loose/soft stool, had stool softener. No need for C.diff testing at this time per MD.  Redness and excoriation noted on abdominal folds, perineal and sacral area. Barrier cream and Mepilex dressing applied. Left Hand PIV infusing 0.9% NS @100/hr. Medications administered per protocol. PT/OT for eval. Up x 2. Fall/Seizure precautions in place. Patient/family updated on plan of care. All needs met at this time.       Problem: Patient/Family Goals  Goal: Patient/Family Long Term Goal  Description: Patient's Long Term Goal: Discharge    Interventions:  - MD Consults  - IV Fluids  - IV ABX    - See additional Care Plan goals for specific interventions  Outcome: Progressing  Goal: Patient/Family Short Term Goal  Description: Patient's Short Term Goal: Treat infection    Interventions:   - IV ABX  - Monitor urine output  - Bladder scan as needed  - See additional Care Plan goals for specific interventions  Outcome: Progressing

## 2022-10-22 ENCOUNTER — APPOINTMENT (OUTPATIENT)
Dept: GENERAL RADIOLOGY | Facility: HOSPITAL | Age: 82
End: 2022-10-22
Attending: INTERNAL MEDICINE
Payer: MEDICARE

## 2022-10-22 VITALS
TEMPERATURE: 98 F | SYSTOLIC BLOOD PRESSURE: 134 MMHG | RESPIRATION RATE: 18 BRPM | WEIGHT: 163.19 LBS | BODY MASS INDEX: 30 KG/M2 | OXYGEN SATURATION: 94 % | HEART RATE: 77 BPM | DIASTOLIC BLOOD PRESSURE: 62 MMHG

## 2022-10-22 LAB
ANION GAP SERPL CALC-SCNC: 7 MMOL/L (ref 0–18)
BASOPHILS # BLD AUTO: 0.04 X10(3) UL (ref 0–0.2)
BASOPHILS NFR BLD AUTO: 0.4 %
BUN BLD-MCNC: 7 MG/DL (ref 7–18)
CALCIUM BLD-MCNC: 8.3 MG/DL (ref 8.5–10.1)
CHLORIDE SERPL-SCNC: 114 MMOL/L (ref 98–112)
CO2 SERPL-SCNC: 22 MMOL/L (ref 21–32)
CREAT BLD-MCNC: 0.5 MG/DL
EOSINOPHIL # BLD AUTO: 0.4 X10(3) UL (ref 0–0.7)
EOSINOPHIL NFR BLD AUTO: 3.6 %
ERYTHROCYTE [DISTWIDTH] IN BLOOD BY AUTOMATED COUNT: 12.5 %
GFR SERPLBLD BASED ON 1.73 SQ M-ARVRAT: 95 ML/MIN/1.73M2 (ref 60–?)
GLUCOSE BLD-MCNC: 91 MG/DL (ref 70–99)
HCT VFR BLD AUTO: 32.5 %
HGB BLD-MCNC: 11.5 G/DL
IMM GRANULOCYTES # BLD AUTO: 0.05 X10(3) UL (ref 0–1)
IMM GRANULOCYTES NFR BLD: 0.4 %
LYMPHOCYTES # BLD AUTO: 1.3 X10(3) UL (ref 1–4)
LYMPHOCYTES NFR BLD AUTO: 11.7 %
MCH RBC QN AUTO: 34 PG (ref 26–34)
MCHC RBC AUTO-ENTMCNC: 35.4 G/DL (ref 31–37)
MCV RBC AUTO: 96.2 FL
MONOCYTES # BLD AUTO: 0.93 X10(3) UL (ref 0.1–1)
MONOCYTES NFR BLD AUTO: 8.3 %
NEUTROPHILS # BLD AUTO: 8.43 X10 (3) UL (ref 1.5–7.7)
NEUTROPHILS # BLD AUTO: 8.43 X10(3) UL (ref 1.5–7.7)
NEUTROPHILS NFR BLD AUTO: 75.6 %
OSMOLALITY SERPL CALC.SUM OF ELEC: 294 MOSM/KG (ref 275–295)
PLATELET # BLD AUTO: 127 10(3)UL (ref 150–450)
POTASSIUM SERPL-SCNC: 3.6 MMOL/L (ref 3.5–5.1)
RBC # BLD AUTO: 3.38 X10(6)UL
SODIUM SERPL-SCNC: 143 MMOL/L (ref 136–145)
WBC # BLD AUTO: 11.2 X10(3) UL (ref 4–11)

## 2022-10-22 PROCEDURE — 74018 RADEX ABDOMEN 1 VIEW: CPT | Performed by: INTERNAL MEDICINE

## 2022-10-22 PROCEDURE — 80048 BASIC METABOLIC PNL TOTAL CA: CPT | Performed by: INTERNAL MEDICINE

## 2022-10-22 PROCEDURE — 85025 COMPLETE CBC W/AUTO DIFF WBC: CPT | Performed by: INTERNAL MEDICINE

## 2022-10-22 RX ORDER — LACTULOSE 10 G/15ML
20 SOLUTION ORAL DAILY
Qty: 60 ML | Refills: 0 | Status: SHIPPED | OUTPATIENT
Start: 2022-10-22 | End: 2022-10-24

## 2022-10-22 RX ORDER — POLYETHYLENE GLYCOL 3350 17 G/17G
17 POWDER, FOR SOLUTION ORAL DAILY
Qty: 10 EACH | Refills: 1 | Status: SHIPPED | OUTPATIENT
Start: 2022-10-22

## 2022-10-22 RX ORDER — POLYVINYL ALCOHOL 14 MG/ML
2 SOLUTION/ DROPS OPHTHALMIC 4 TIMES DAILY PRN
Qty: 15 ML | Refills: 0 | Status: SHIPPED | OUTPATIENT
Start: 2022-10-22

## 2022-10-22 RX ORDER — CLONAZEPAM 1 MG/1
1 TABLET ORAL NIGHTLY
Qty: 28 TABLET | Refills: 0 | Status: SHIPPED | OUTPATIENT
Start: 2022-10-22

## 2022-10-22 RX ORDER — CEPHALEXIN 500 MG/1
500 CAPSULE ORAL 3 TIMES DAILY
Qty: 15 CAPSULE | Refills: 0 | Status: SHIPPED | OUTPATIENT
Start: 2022-10-22 | End: 2022-11-02

## 2022-10-22 RX ORDER — LACTULOSE 10 G/15ML
20 SOLUTION ORAL ONCE
Status: COMPLETED | OUTPATIENT
Start: 2022-10-22 | End: 2022-10-22

## 2022-10-22 NOTE — PROGRESS NOTES
Assumed care at 0700, A/Ox3-4. R/A, CPAP. Lactulose given per MD instructions. Transfer to [facility] pending. Staff will continue to monitor.

## 2022-10-22 NOTE — CERTIFICATION
**Certification      PHYSICIAN Certification of Need for Inpatient Hospitalization - Initial Certification    Patient will require inpatient services that will reasonably be expected to span two midnight's based on the clinical documentation in H+P. Based on patients current state of illness, I anticipate that, after discharge, patient will require TBD.

## 2022-10-22 NOTE — PROGRESS NOTES
Pt awake and alert upon assessment. On tele running NSR. On RA. Watery BMs. Pt is currently on bowel regimen for constipation. Pt urinating. Excoriation to perineal area. Orthostatics Qshift. Regular diet.

## 2022-10-22 NOTE — CM/SW NOTE
Patient medically cleared for discharge. LORENE placed phone call to Ankit at [facility] who confirmed bed available for today. SW set up ambulance. PCS form completed. RN updated, nurse to nurse number given.     Karlis Mcburney, LCSW  /Discharge Planner

## 2022-10-22 NOTE — DISCHARGE SUMMARY
[facility]  Discharge Summary    Jonathan Childers Patient Status:  Inpatient    1940 MRN [id number]   [facility] 3NE-A Attending Renetta Roes MD   [facility] Day # 1 PCP More Almonte MD     Date of Admission: 10/19/2022    Date of Discharge: No discharge date for patient encounter. Admitting Diagnosis: Orthostasis [I95.1]  Syncope, near [R55]  Injury of head, initial encounter [S09.90XA]    Discharge Diagnosis: Patient Active Problem List:     Seizure ([address]     Seizure disorder ([address]     Hypothyroidism     Mixed hyperlipidemia     Essential hypertension     Convulsions ([address]     Convulsions, unspecified convulsion type ([address]     Dementia without behavioral disturbance ([address]     Unsteady gait     Coronary artery disease involving native coronary artery of native heart without angina pectoris     Obesity due to excess calories     Scalp psoriasis     Bilateral leg edema     Vitamin D deficiency     Osteoarthritis of both knees     Psoriasis     Hearing loss of left ear     Mixed stress and urge incontinence     Prediabetes     Partial symptomatic epilepsy with complex partial seizures, intractable, without status epilepticus ([address]     Medication monitoring encounter     OAB (overactive bladder)     Chronic constipation     Perineal ulcer, limited to breakdown of skin (HCC)     Alteration in cognition     Peripheral vascular disease, unspecified ([address]     Age-related nuclear cataract of both eyes     Gait disturbance     Syncope, near     Injury of head, initial encounter     Orthostasis      Reason for Admission: ***    Physical Exam: See progress note        Hospital Course: ***    Disposition:     Discharge Condition: Stable    Discharge Medications: Current Discharge Medication List    START taking these medications    lactulose 10 GM/15ML Oral Solution  Take 30 mL (20 g total) by mouth daily for 2 days.   Qty: 60 mL Refills: 0    cephalexin 500 MG Oral Cap  Take 1 capsule (500 mg total) by mouth 3 (three) times daily. Qty: 15 capsule Refills: 0      CONTINUE these medications which have CHANGED    clonazePAM 1 MG Oral Tab  Take 1 tablet (1 mg total) by mouth nightly. Qty: 28 tablet Refills: 0    polyethylene glycol, PEG 3350, 17 g Oral Powd Pack  Take 17 g by mouth daily. Qty: 10 each Refills: 1    polyvinyl alcohol (ARTIFICIAL TEARS) 1.4 % Ophthalmic Solution  Place 0.1 mL (2 drops total) into both eyes 4 (four) times daily as needed. Qty: 15 mL Refills: 0      CONTINUE these medications which have NOT CHANGED    Calcium Carbonate-Vit D-Min (CALCIUM 1200) 2032-9454 MG-UNIT Oral Chew Tab  Chew 1 tablet by mouth daily. Omega-3 Fatty Acids (FISH OIL) 1200 MG Oral Cap  Take 1,200 mg by mouth daily. !! phenytoin  MG Oral Cap  Take 100 mg by mouth 4 (four) times a week. Every Tue, Thu, Sat and Sun for seizures. In the morning at 0900 am    !! Phenytoin Sodium Extended 200 MG Oral Cap  Take 200 mg by mouth nightly. At bedtime    !! Phenytoin Sodium Extended 200 MG Oral Cap  Take 200 mg by mouth Every Monday, Wednesday, and Friday. At 0900 am . Pt takes 100 mg on all other days in the morning    lacosamide (VIMPAT) 100 MG Oral Tab  Take 100 mg by mouth 2 (two) times daily.  At 0900 am and 2000    FLUTICASONE PROPIONATE 50 MCG/ACT Nasal Suspension  INSTILL ONE SPRAY INTO EACH NOSTRIL EVERY 12 HOURS  Qty: 16 g Refills: 11    ATORVASTATIN 20 MG Oral Tab  TAKE ONE TABLET BY MOUTH NIGHTLY  Qty: 14 tablet Refills: 11     MG Oral Cap  TAKE ONE CAPSULE BY MOUTH 2 TIMES A DAY  Qty: 28 capsule Refills: 11    FAMOTIDINE 20 MG Oral Tab  TAKE ONE TABLET BY MOUTH EVERY DAY, STOP PROTONIX  Qty: 14 tablet Refills: 11    FOLIC ACID 1 MG Oral Tab  TAKE ONE TABLET BY MOUTH EVERY DAY  Qty: 14 tablet Refills: 11    GABAPENTIN 300 MG Oral Cap  TAKE ONE CAPSULE BY MOUTH 2 TIMES A DAY  Qty: 28 capsule Refills: 11  Associated Diagnoses:Seizure (HCC)    LEVOTHYROXINE 175 MCG Oral Tab  TAKE ONE TABLET BY MOUTH EVERY MORNING  Qty: 14 tablet Refills: 11    METOPROLOL TARTRATE 25 MG Oral Tab  TAKE ONE TABLET BY MOUTH 2 TIMES A DAY  Qty: 28 tablet Refills: 11  Associated Diagnoses:Seizure ([facility])    MYRBETRIQ 50 MG Oral Tablet 24 Hr  TAKE ONE TABLET BY MOUTH EVERY DAY  Qty: 14 tablet Refills: 11    sennosides-docusate (STIMULANT LAXATIVE) 8.6-50 MG Oral Tab  Take 2 tablets by mouth every morning. Qty: 28 tablet Refills: 11    Vitamin D3, Cholecalciferol, 25 MCG (1000 UT) Oral Tab  Take 2 tablets (2,000 Units total) by mouth daily. Qty: 30 tablet Refills: 11    MAPAP 500 MG Oral Tab  TAKE ONE TABLET BY MOUTH EVERY 6 HOUR AS NEEDED FOR PAIN  Qty: 20 tablet Refills: 5    Calcium Carbonate Antacid (TUMS) 500 MG Oral Chew Tab  Chew 1 tablet (500 mg total) by mouth 2 (two) times daily as needed for Heartburn. Qty: 28 tablet Refills: 1    aspirin 81 MG Oral Tab  Take 1 tablet (81 mg total) by mouth daily. Qty: 90 tablet Refills: 0  Associated Diagnoses:Seizure ([address]    ! ! - Potential duplicate medications found. Please discuss with provider. STOP taking these medications    Menthol-Zinc Oxide (CALMOSEPTINE) 0.44-20.6 % External Ointment    diclofenac 1 % External Gel    hydrocortisone 2.5 % External Cream    BUMETANIDE 0.5 MG Oral Tab    ketoconazole 2 % External Shampoo    hydrocortisone 2.5 % External Ointment          Follow up Visits: Follow-up with Physicians as directed.         Nel العلي MD  10/22/2022  12:40 PM

## 2022-10-22 NOTE — PROGRESS NOTES
NURSING DISCHARGE NOTE    Discharged Rehab facility via Ambulance. Accompanied by Support staff  Belongings Taken by patient/family. Discharge paperwork provided and explained. Copy of AVS given to family. Report called and given to receiving facility. All questions and concerns addressed.

## 2022-11-08 ENCOUNTER — LAB REQUISITION (OUTPATIENT)
Dept: LAB | Facility: HOSPITAL | Age: 82
End: 2022-11-08
Payer: MEDICARE

## 2022-11-08 DIAGNOSIS — Z87.440 PERSONAL HISTORY OF URINARY (TRACT) INFECTIONS: ICD-10-CM

## 2022-11-08 LAB
ANION GAP SERPL CALC-SCNC: 4 MMOL/L (ref 0–18)
BUN BLD-MCNC: 9 MG/DL (ref 7–18)
CALCIUM BLD-MCNC: 9.1 MG/DL (ref 8.5–10.1)
CHLORIDE SERPL-SCNC: 112 MMOL/L (ref 98–112)
CO2 SERPL-SCNC: 26 MMOL/L (ref 21–32)
CREAT BLD-MCNC: 0.62 MG/DL
ERYTHROCYTE [DISTWIDTH] IN BLOOD BY AUTOMATED COUNT: 13 %
GFR SERPLBLD BASED ON 1.73 SQ M-ARVRAT: 90 ML/MIN/1.73M2 (ref 60–?)
GLUCOSE BLD-MCNC: 89 MG/DL (ref 70–99)
HCT VFR BLD AUTO: 33.9 %
HGB BLD-MCNC: 11.5 G/DL
MCH RBC QN AUTO: 33.4 PG (ref 26–34)
MCHC RBC AUTO-ENTMCNC: 33.9 G/DL (ref 31–37)
MCV RBC AUTO: 98.5 FL
OSMOLALITY SERPL CALC.SUM OF ELEC: 292 MOSM/KG (ref 275–295)
PLATELET # BLD AUTO: 194 10(3)UL (ref 150–450)
POTASSIUM SERPL-SCNC: 4.1 MMOL/L (ref 3.5–5.1)
RBC # BLD AUTO: 3.44 X10(6)UL
SODIUM SERPL-SCNC: 142 MMOL/L (ref 136–145)
WBC # BLD AUTO: 6.2 X10(3) UL (ref 4–11)

## 2022-11-08 PROCEDURE — 85027 COMPLETE CBC AUTOMATED: CPT | Performed by: INTERNAL MEDICINE

## 2022-11-08 PROCEDURE — 80048 BASIC METABOLIC PNL TOTAL CA: CPT | Performed by: INTERNAL MEDICINE

## 2022-12-21 PROBLEM — G31.84 MILD COGNITIVE IMPAIRMENT: Status: ACTIVE | Noted: 2021-12-20

## 2023-02-01 PROCEDURE — 87086 URINE CULTURE/COLONY COUNT: CPT | Performed by: INTERNAL MEDICINE

## 2023-02-01 PROCEDURE — 81001 URINALYSIS AUTO W/SCOPE: CPT | Performed by: INTERNAL MEDICINE

## 2023-02-02 ENCOUNTER — LAB REQUISITION (OUTPATIENT)
Dept: LAB | Facility: HOSPITAL | Age: 83
End: 2023-02-02
Payer: MEDICARE

## 2023-02-02 DIAGNOSIS — R35.0 FREQUENCY OF MICTURITION: ICD-10-CM

## 2023-02-02 DIAGNOSIS — N39.46 MIXED INCONTINENCE: ICD-10-CM

## 2023-02-02 LAB
ALBUMIN SERPL-MCNC: 3 G/DL (ref 3.4–5)
ALBUMIN/GLOB SERPL: 1 {RATIO} (ref 1–2)
ALP LIVER SERPL-CCNC: 67 U/L
ALT SERPL-CCNC: 15 U/L
ANION GAP SERPL CALC-SCNC: 3 MMOL/L (ref 0–18)
AST SERPL-CCNC: 24 U/L (ref 15–37)
BASOPHILS # BLD AUTO: 0.08 X10(3) UL (ref 0–0.2)
BASOPHILS NFR BLD AUTO: 1.4 %
BILIRUB SERPL-MCNC: 0.2 MG/DL (ref 0.1–2)
BILIRUB UR QL STRIP.AUTO: NEGATIVE
BUN BLD-MCNC: 10 MG/DL (ref 7–18)
CALCIUM BLD-MCNC: 9.1 MG/DL (ref 8.5–10.1)
CHLORIDE SERPL-SCNC: 109 MMOL/L (ref 98–112)
CLARITY UR REFRACT.AUTO: CLEAR
CO2 SERPL-SCNC: 27 MMOL/L (ref 21–32)
COLOR UR AUTO: YELLOW
CREAT BLD-MCNC: 0.56 MG/DL
EOSINOPHIL # BLD AUTO: 0.53 X10(3) UL (ref 0–0.7)
EOSINOPHIL NFR BLD AUTO: 9.5 %
ERYTHROCYTE [DISTWIDTH] IN BLOOD BY AUTOMATED COUNT: 12 %
GFR SERPLBLD BASED ON 1.73 SQ M-ARVRAT: 92 ML/MIN/1.73M2 (ref 60–?)
GLOBULIN PLAS-MCNC: 3 G/DL (ref 2.8–4.4)
GLUCOSE BLD-MCNC: 81 MG/DL (ref 70–99)
GLUCOSE UR STRIP.AUTO-MCNC: NEGATIVE MG/DL
HCT VFR BLD AUTO: 36.6 %
HGB BLD-MCNC: 12.7 G/DL
IMM GRANULOCYTES # BLD AUTO: 0.01 X10(3) UL (ref 0–1)
IMM GRANULOCYTES NFR BLD: 0.2 %
KETONES UR STRIP.AUTO-MCNC: NEGATIVE MG/DL
LYMPHOCYTES # BLD AUTO: 1.91 X10(3) UL (ref 1–4)
LYMPHOCYTES NFR BLD AUTO: 34.2 %
MCH RBC QN AUTO: 33.2 PG (ref 26–34)
MCHC RBC AUTO-ENTMCNC: 34.7 G/DL (ref 31–37)
MCV RBC AUTO: 95.8 FL
MONOCYTES # BLD AUTO: 0.57 X10(3) UL (ref 0.1–1)
MONOCYTES NFR BLD AUTO: 10.2 %
NEUTROPHILS # BLD AUTO: 2.49 X10 (3) UL (ref 1.5–7.7)
NEUTROPHILS # BLD AUTO: 2.49 X10(3) UL (ref 1.5–7.7)
NEUTROPHILS NFR BLD AUTO: 44.5 %
NITRITE UR QL STRIP.AUTO: NEGATIVE
OSMOLALITY SERPL CALC.SUM OF ELEC: 286 MOSM/KG (ref 275–295)
PH UR STRIP.AUTO: 7 [PH] (ref 5–8)
PLATELET # BLD AUTO: 148 10(3)UL (ref 150–450)
POTASSIUM SERPL-SCNC: 4.2 MMOL/L (ref 3.5–5.1)
PROT SERPL-MCNC: 6 G/DL (ref 6.4–8.2)
PROT UR STRIP.AUTO-MCNC: NEGATIVE MG/DL
RBC # BLD AUTO: 3.82 X10(6)UL
RBC UR QL AUTO: NEGATIVE
SODIUM SERPL-SCNC: 139 MMOL/L (ref 136–145)
SP GR UR STRIP.AUTO: 1.01 (ref 1–1.03)
UROBILINOGEN UR STRIP.AUTO-MCNC: 0.2 MG/DL
WBC # BLD AUTO: 5.6 X10(3) UL (ref 4–11)

## 2023-02-02 PROCEDURE — 85025 COMPLETE CBC W/AUTO DIFF WBC: CPT | Performed by: INTERNAL MEDICINE

## 2023-02-02 PROCEDURE — 80053 COMPREHEN METABOLIC PANEL: CPT | Performed by: INTERNAL MEDICINE

## 2023-03-15 PROBLEM — J30.89 NON-SEASONAL ALLERGIC RHINITIS: Status: ACTIVE | Noted: 2023-03-15

## 2023-03-22 PROCEDURE — 87086 URINE CULTURE/COLONY COUNT: CPT | Performed by: INTERNAL MEDICINE

## 2023-03-22 PROCEDURE — 81001 URINALYSIS AUTO W/SCOPE: CPT | Performed by: INTERNAL MEDICINE

## 2023-03-22 PROCEDURE — 87077 CULTURE AEROBIC IDENTIFY: CPT | Performed by: INTERNAL MEDICINE

## 2023-03-23 ENCOUNTER — LAB REQUISITION (OUTPATIENT)
Dept: LAB | Facility: HOSPITAL | Age: 83
End: 2023-03-23
Payer: MEDICARE

## 2023-03-23 DIAGNOSIS — R30.0 DYSURIA: ICD-10-CM

## 2023-03-23 LAB
BILIRUB UR QL STRIP.AUTO: NEGATIVE
CLARITY UR REFRACT.AUTO: CLEAR
COLOR UR AUTO: YELLOW
GLUCOSE UR STRIP.AUTO-MCNC: NEGATIVE MG/DL
KETONES UR STRIP.AUTO-MCNC: NEGATIVE MG/DL
NITRITE UR QL STRIP.AUTO: NEGATIVE
PH UR STRIP.AUTO: 6 [PH] (ref 5–8)
PROT UR STRIP.AUTO-MCNC: NEGATIVE MG/DL
RBC UR QL AUTO: NEGATIVE
SP GR UR STRIP.AUTO: 1 (ref 1–1.03)
UROBILINOGEN UR STRIP.AUTO-MCNC: <2 MG/DL

## 2023-04-12 PROBLEM — H90.6 MIXED CONDUCTIVE AND SENSORINEURAL HEARING LOSS OF BOTH EARS: Status: ACTIVE | Noted: 2023-04-12

## 2023-05-01 ENCOUNTER — APPOINTMENT (OUTPATIENT)
Dept: GENERAL RADIOLOGY | Facility: HOSPITAL | Age: 83
End: 2023-05-01
Attending: EMERGENCY MEDICINE
Payer: MEDICARE

## 2023-05-01 ENCOUNTER — HOSPITAL ENCOUNTER (INPATIENT)
Facility: HOSPITAL | Age: 83
LOS: 1 days | Discharge: NURSING, REHAB OR LONG-TERM CARE | End: 2023-05-03
Attending: EMERGENCY MEDICINE | Admitting: INTERNAL MEDICINE
Payer: MEDICARE

## 2023-05-01 ENCOUNTER — APPOINTMENT (OUTPATIENT)
Dept: CT IMAGING | Facility: HOSPITAL | Age: 83
End: 2023-05-01
Attending: EMERGENCY MEDICINE
Payer: MEDICARE

## 2023-05-01 DIAGNOSIS — R41.82 ALTERED MENTAL STATUS, UNSPECIFIED ALTERED MENTAL STATUS TYPE: Primary | ICD-10-CM

## 2023-05-01 DIAGNOSIS — R47.9 SPEECH DISTURBANCE, UNSPECIFIED TYPE: ICD-10-CM

## 2023-05-01 LAB
ALBUMIN SERPL-MCNC: 3.5 G/DL (ref 3.4–5)
ALBUMIN/GLOB SERPL: 1.2 {RATIO} (ref 1–2)
ALP LIVER SERPL-CCNC: 72 U/L
ALT SERPL-CCNC: 20 U/L
ANION GAP SERPL CALC-SCNC: 4 MMOL/L (ref 0–18)
AST SERPL-CCNC: 19 U/L (ref 15–37)
BASOPHILS # BLD AUTO: 0.05 X10(3) UL (ref 0–0.2)
BASOPHILS NFR BLD AUTO: 0.5 %
BILIRUB SERPL-MCNC: 0.4 MG/DL (ref 0.1–2)
BILIRUB UR QL STRIP.AUTO: NEGATIVE
BUN BLD-MCNC: 16 MG/DL (ref 7–18)
CALCIUM BLD-MCNC: 8.7 MG/DL (ref 8.5–10.1)
CHLORIDE SERPL-SCNC: 105 MMOL/L (ref 98–112)
CLARITY UR REFRACT.AUTO: CLEAR
CO2 SERPL-SCNC: 26 MMOL/L (ref 21–32)
COLOR UR AUTO: YELLOW
CREAT BLD-MCNC: 0.67 MG/DL
EOSINOPHIL # BLD AUTO: 0.2 X10(3) UL (ref 0–0.7)
EOSINOPHIL NFR BLD AUTO: 1.9 %
ERYTHROCYTE [DISTWIDTH] IN BLOOD BY AUTOMATED COUNT: 12.2 %
GFR SERPLBLD BASED ON 1.73 SQ M-ARVRAT: 88 ML/MIN/1.73M2 (ref 60–?)
GLOBULIN PLAS-MCNC: 2.9 G/DL (ref 2.8–4.4)
GLUCOSE BLD-MCNC: 112 MG/DL (ref 70–99)
GLUCOSE BLD-MCNC: 98 MG/DL (ref 70–99)
GLUCOSE UR STRIP.AUTO-MCNC: NEGATIVE MG/DL
HCT VFR BLD AUTO: 36.9 %
HGB BLD-MCNC: 12.6 G/DL
HYALINE CASTS #/AREA URNS AUTO: PRESENT /LPF
IMM GRANULOCYTES # BLD AUTO: 0.04 X10(3) UL (ref 0–1)
IMM GRANULOCYTES NFR BLD: 0.4 %
KETONES UR STRIP.AUTO-MCNC: NEGATIVE MG/DL
LEUKOCYTE ESTERASE UR QL STRIP.AUTO: NEGATIVE
LYMPHOCYTES # BLD AUTO: 1.35 X10(3) UL (ref 1–4)
LYMPHOCYTES NFR BLD AUTO: 12.5 %
MCH RBC QN AUTO: 32.6 PG (ref 26–34)
MCHC RBC AUTO-ENTMCNC: 34.1 G/DL (ref 31–37)
MCV RBC AUTO: 95.6 FL
MONOCYTES # BLD AUTO: 0.86 X10(3) UL (ref 0.1–1)
MONOCYTES NFR BLD AUTO: 8 %
NEUTROPHILS # BLD AUTO: 8.3 X10 (3) UL (ref 1.5–7.7)
NEUTROPHILS # BLD AUTO: 8.3 X10(3) UL (ref 1.5–7.7)
NEUTROPHILS NFR BLD AUTO: 76.7 %
NITRITE UR QL STRIP.AUTO: NEGATIVE
OSMOLALITY SERPL CALC.SUM OF ELEC: 281 MOSM/KG (ref 275–295)
PH UR STRIP.AUTO: 6 [PH] (ref 5–8)
PLATELET # BLD AUTO: 149 10(3)UL (ref 150–450)
POTASSIUM SERPL-SCNC: 3.8 MMOL/L (ref 3.5–5.1)
PROT SERPL-MCNC: 6.4 G/DL (ref 6.4–8.2)
PROT UR STRIP.AUTO-MCNC: NEGATIVE MG/DL
RBC # BLD AUTO: 3.86 X10(6)UL
SODIUM SERPL-SCNC: 135 MMOL/L (ref 136–145)
SP GR UR STRIP.AUTO: 1.01 (ref 1–1.03)
UROBILINOGEN UR STRIP.AUTO-MCNC: <2 MG/DL
WBC # BLD AUTO: 10.8 X10(3) UL (ref 4–11)

## 2023-05-01 PROCEDURE — 70450 CT HEAD/BRAIN W/O DYE: CPT | Performed by: EMERGENCY MEDICINE

## 2023-05-01 PROCEDURE — 71045 X-RAY EXAM CHEST 1 VIEW: CPT | Performed by: EMERGENCY MEDICINE

## 2023-05-01 RX ORDER — SODIUM CHLORIDE 9 MG/ML
INJECTION, SOLUTION INTRAVENOUS CONTINUOUS
Status: DISCONTINUED | OUTPATIENT
Start: 2023-05-01 | End: 2023-05-03

## 2023-05-02 ENCOUNTER — NURSE ONLY (OUTPATIENT)
Dept: ELECTROPHYSIOLOGY | Facility: HOSPITAL | Age: 83
End: 2023-05-02
Attending: INTERNAL MEDICINE
Payer: MEDICARE

## 2023-05-02 ENCOUNTER — APPOINTMENT (OUTPATIENT)
Dept: GENERAL RADIOLOGY | Facility: HOSPITAL | Age: 83
End: 2023-05-02
Attending: INTERNAL MEDICINE
Payer: MEDICARE

## 2023-05-02 PROBLEM — R47.9 SPEECH DISTURBANCE, UNSPECIFIED TYPE: Status: ACTIVE | Noted: 2023-05-02

## 2023-05-02 PROBLEM — R41.82 ALTERED MENTAL STATUS, UNSPECIFIED ALTERED MENTAL STATUS TYPE: Status: ACTIVE | Noted: 2023-05-02

## 2023-05-02 PROBLEM — R41.82 ALTERED MENTAL STATUS: Status: ACTIVE | Noted: 2023-05-02

## 2023-05-02 LAB
ATRIAL RATE: 93 BPM
MAGNESIUM SERPL-MCNC: 1.7 MG/DL (ref 1.6–2.6)
P AXIS: 55 DEGREES
P-R INTERVAL: 166 MS
PHENYTOIN SERPL-MCNC: 13.4 UG/ML (ref 10–20)
Q-T INTERVAL: 354 MS
QRS DURATION: 76 MS
QTC CALCULATION (BEZET): 440 MS
R AXIS: -12 DEGREES
SARS-COV-2 RNA RESP QL NAA+PROBE: NOT DETECTED
T AXIS: 38 DEGREES
VENTRICULAR RATE: 93 BPM

## 2023-05-02 PROCEDURE — 95816 EEG AWAKE AND DROWSY: CPT | Performed by: OTHER

## 2023-05-02 PROCEDURE — 99223 1ST HOSP IP/OBS HIGH 75: CPT | Performed by: OTHER

## 2023-05-02 PROCEDURE — 74018 RADEX ABDOMEN 1 VIEW: CPT | Performed by: INTERNAL MEDICINE

## 2023-05-02 RX ORDER — CLONAZEPAM 1 MG/1
1 TABLET ORAL NIGHTLY
Status: DISCONTINUED | OUTPATIENT
Start: 2023-05-02 | End: 2023-05-03

## 2023-05-02 RX ORDER — GABAPENTIN 300 MG/1
300 CAPSULE ORAL 2 TIMES DAILY
Status: DISCONTINUED | OUTPATIENT
Start: 2023-05-02 | End: 2023-05-03

## 2023-05-02 RX ORDER — LORAZEPAM 1 MG/1
1 TABLET ORAL NIGHTLY
Status: DISCONTINUED | OUTPATIENT
Start: 2023-05-02 | End: 2023-05-02

## 2023-05-02 RX ORDER — CALCIUM CARBONATE-CHOLECALCIFEROL TAB 250 MG-125 UNIT 250-125 MG-UNIT
2 TAB ORAL DAILY
Status: DISCONTINUED | OUTPATIENT
Start: 2023-05-02 | End: 2023-05-03

## 2023-05-02 RX ORDER — HEPARIN SODIUM 5000 [USP'U]/ML
5000 INJECTION, SOLUTION INTRAVENOUS; SUBCUTANEOUS EVERY 12 HOURS SCHEDULED
Status: DISCONTINUED | OUTPATIENT
Start: 2023-05-02 | End: 2023-05-03

## 2023-05-02 RX ORDER — ATORVASTATIN CALCIUM 20 MG/1
20 TABLET, FILM COATED ORAL NIGHTLY
Status: DISCONTINUED | OUTPATIENT
Start: 2023-05-02 | End: 2023-05-03

## 2023-05-02 RX ORDER — POTASSIUM CHLORIDE 20 MEQ/1
40 TABLET, EXTENDED RELEASE ORAL ONCE
Status: COMPLETED | OUTPATIENT
Start: 2023-05-02 | End: 2023-05-02

## 2023-05-02 RX ORDER — FLUTICASONE PROPIONATE 50 MCG
1 SPRAY, SUSPENSION (ML) NASAL EVERY 12 HOURS
Status: DISCONTINUED | OUTPATIENT
Start: 2023-05-02 | End: 2023-05-03

## 2023-05-02 RX ORDER — FAMOTIDINE 20 MG/1
20 TABLET, FILM COATED ORAL DAILY
Status: DISCONTINUED | OUTPATIENT
Start: 2023-05-02 | End: 2023-05-03

## 2023-05-02 RX ORDER — PHENYTOIN SODIUM 100 MG/1
200 CAPSULE, EXTENDED RELEASE ORAL NIGHTLY
Status: DISCONTINUED | OUTPATIENT
Start: 2023-05-02 | End: 2023-05-03

## 2023-05-02 RX ORDER — MAGNESIUM OXIDE 400 MG/1
400 TABLET ORAL ONCE
Status: COMPLETED | OUTPATIENT
Start: 2023-05-02 | End: 2023-05-02

## 2023-05-02 RX ORDER — PHENYTOIN SODIUM 100 MG/1
100 CAPSULE, EXTENDED RELEASE ORAL
Status: DISCONTINUED | OUTPATIENT
Start: 2023-05-02 | End: 2023-05-03

## 2023-05-02 RX ORDER — LORAZEPAM 2 MG/ML
INJECTION INTRAMUSCULAR
Status: DISPENSED
Start: 2023-05-02 | End: 2023-05-02

## 2023-05-02 RX ORDER — PHENYTOIN SODIUM 100 MG/1
200 CAPSULE, EXTENDED RELEASE ORAL
Status: DISCONTINUED | OUTPATIENT
Start: 2023-05-03 | End: 2023-05-03

## 2023-05-02 RX ORDER — DOCUSATE SODIUM 100 MG/1
100 CAPSULE, LIQUID FILLED ORAL 2 TIMES DAILY
Status: DISCONTINUED | OUTPATIENT
Start: 2023-05-02 | End: 2023-05-03

## 2023-05-02 RX ORDER — LORAZEPAM 2 MG/ML
1 INJECTION INTRAMUSCULAR
Status: DISCONTINUED | OUTPATIENT
Start: 2023-05-02 | End: 2023-05-03

## 2023-05-02 RX ORDER — LACOSAMIDE 100 MG/1
100 TABLET ORAL 2 TIMES DAILY
Status: DISCONTINUED | OUTPATIENT
Start: 2023-05-02 | End: 2023-05-03

## 2023-05-02 RX ORDER — ASPIRIN 81 MG/1
81 TABLET ORAL DAILY
Status: DISCONTINUED | OUTPATIENT
Start: 2023-05-02 | End: 2023-05-03

## 2023-05-02 RX ORDER — ASPIRIN 81 MG/1
324 TABLET, CHEWABLE ORAL ONCE
Status: COMPLETED | OUTPATIENT
Start: 2023-05-02 | End: 2023-05-02

## 2023-05-02 RX ORDER — ONDANSETRON 2 MG/ML
4 INJECTION INTRAMUSCULAR; INTRAVENOUS EVERY 4 HOURS PRN
Status: ACTIVE | OUTPATIENT
Start: 2023-05-02 | End: 2023-05-02

## 2023-05-02 RX ORDER — LORAZEPAM 2 MG/ML
1 INJECTION INTRAMUSCULAR ONCE
Status: COMPLETED | OUTPATIENT
Start: 2023-05-02 | End: 2023-05-02

## 2023-05-02 RX ORDER — POLYVINYL ALCOHOL 14 MG/ML
2 SOLUTION/ DROPS OPHTHALMIC 4 TIMES DAILY PRN
Status: DISCONTINUED | OUTPATIENT
Start: 2023-05-02 | End: 2023-05-03

## 2023-05-02 RX ORDER — CALCIUM CARBONATE 200(500)MG
500 TABLET,CHEWABLE ORAL 2 TIMES DAILY PRN
Status: DISCONTINUED | OUTPATIENT
Start: 2023-05-02 | End: 2023-05-03

## 2023-05-02 RX ORDER — FOLIC ACID 1 MG/1
1 TABLET ORAL DAILY
Status: DISCONTINUED | OUTPATIENT
Start: 2023-05-02 | End: 2023-05-03

## 2023-05-02 NOTE — ED INITIAL ASSESSMENT (HPI)
Pt presents to Ed per EMS for AMS. Last known aly west@ 1700. Staff noted patient with slight aphasia, unable to remember what she did today, what she ate, giggling when  asked questions. Leida negative. P further has hx of dementia.

## 2023-05-02 NOTE — OCCUPATIONAL THERAPY NOTE
Attempted to see patient for OT treatment session, however patient occupied w/ EEG. Will re-attempt as schedule permits.

## 2023-05-02 NOTE — PHYSICAL THERAPY NOTE
Orders received and chart reviewed. PT attempted to work with the patient, however per RN, patient currently getting EEG. Will attempt to follow-up for evaluation as triage scheduling and patient appropriateness allows.

## 2023-05-02 NOTE — PROCEDURES
.  EEG REPORT;    Reason for Examination: altered mental status    Technical Summary:   18 Channels of EEG and 1 Channel of EKG was performed utilizing internation 10/20 method. Background Activity: The background activity consisted of 7-8 Hz waveforms, reactive to external stimulation. Abnormality:  Throughout the recording mild to moderate, 2 to 5 Hz, diffuse slow activity was noted. Activation:    Hyperventilation:   Not performed. Photic Stimulation:  Mild driving response seen. Sleep:  Stage I sleep seen. Impression:  Abnormal EEG. Mild to moderate diffuse slowing into delta and theta range was noted. This constellation of findings can be seen in encephalopathy due to metabolic/toxic etiology, medication effects or diffuse cerebral injury. No focal, lateralized or generalized epileptiform activity seen. Clinical correlation is recommended. Deja Ochoa MD  [facility].

## 2023-05-02 NOTE — CM/SW NOTE
Patient is a long term care resident at [facility] supportive living. SW sent medical updates to [facility] via [facility] for eventual patient return. SW will continue to follow for plan of care changes and remain available for any additional DC needs or concerns.      Florence Fernandez MSW, LSW  Discharge Planner   [phone]

## 2023-05-02 NOTE — ED QUICK NOTES
Orders for admission, patient is aware of plan and ready to go upstairs. Any questions, please call ED RN Chalo Bee at extension [phone].      Patient Covid vaccination status: Fully vaccinated     COVID Test Ordered in ED: Rapid SARS-CoV-2 by PCR    COVID Suspicion at Admission: N/A    Running Infusions:    None    Mental Status/LOC at time of transport: A & 0 x 3    Other pertinent information:   CIWA score: N/A   NIH score:  0

## 2023-05-03 VITALS
BODY MASS INDEX: 30.36 KG/M2 | OXYGEN SATURATION: 100 % | HEIGHT: 62 IN | DIASTOLIC BLOOD PRESSURE: 61 MMHG | TEMPERATURE: 98 F | HEART RATE: 70 BPM | RESPIRATION RATE: 18 BRPM | SYSTOLIC BLOOD PRESSURE: 133 MMHG | WEIGHT: 165 LBS

## 2023-05-03 LAB
ANION GAP SERPL CALC-SCNC: 3 MMOL/L (ref 0–18)
BASOPHILS # BLD AUTO: 0.06 X10(3) UL (ref 0–0.2)
BASOPHILS NFR BLD AUTO: 0.8 %
BUN BLD-MCNC: 7 MG/DL (ref 7–18)
CALCIUM BLD-MCNC: 8.1 MG/DL (ref 8.5–10.1)
CHLORIDE SERPL-SCNC: 113 MMOL/L (ref 98–112)
CO2 SERPL-SCNC: 24 MMOL/L (ref 21–32)
CREAT BLD-MCNC: 0.59 MG/DL
EOSINOPHIL # BLD AUTO: 0.21 X10(3) UL (ref 0–0.7)
EOSINOPHIL NFR BLD AUTO: 2.9 %
ERYTHROCYTE [DISTWIDTH] IN BLOOD BY AUTOMATED COUNT: 12.4 %
GFR SERPLBLD BASED ON 1.73 SQ M-ARVRAT: 90 ML/MIN/1.73M2 (ref 60–?)
GLUCOSE BLD-MCNC: 97 MG/DL (ref 70–99)
HCT VFR BLD AUTO: 33 %
HGB BLD-MCNC: 11.5 G/DL
IMM GRANULOCYTES # BLD AUTO: 0.03 X10(3) UL (ref 0–1)
IMM GRANULOCYTES NFR BLD: 0.4 %
LYMPHOCYTES # BLD AUTO: 1.95 X10(3) UL (ref 1–4)
LYMPHOCYTES NFR BLD AUTO: 27.2 %
MAGNESIUM SERPL-MCNC: 2 MG/DL (ref 1.6–2.6)
MCH RBC QN AUTO: 33.2 PG (ref 26–34)
MCHC RBC AUTO-ENTMCNC: 34.8 G/DL (ref 31–37)
MCV RBC AUTO: 95.4 FL
MONOCYTES # BLD AUTO: 0.67 X10(3) UL (ref 0.1–1)
MONOCYTES NFR BLD AUTO: 9.3 %
NEUTROPHILS # BLD AUTO: 4.26 X10 (3) UL (ref 1.5–7.7)
NEUTROPHILS # BLD AUTO: 4.26 X10(3) UL (ref 1.5–7.7)
NEUTROPHILS NFR BLD AUTO: 59.4 %
OSMOLALITY SERPL CALC.SUM OF ELEC: 288 MOSM/KG (ref 275–295)
PLATELET # BLD AUTO: 134 10(3)UL (ref 150–450)
POTASSIUM SERPL-SCNC: 3.9 MMOL/L (ref 3.5–5.1)
POTASSIUM SERPL-SCNC: 3.9 MMOL/L (ref 3.5–5.1)
RBC # BLD AUTO: 3.46 X10(6)UL
SODIUM SERPL-SCNC: 140 MMOL/L (ref 136–145)
WBC # BLD AUTO: 7.2 X10(3) UL (ref 4–11)

## 2023-05-03 PROCEDURE — 99233 SBSQ HOSP IP/OBS HIGH 50: CPT | Performed by: OTHER

## 2023-05-03 RX ORDER — MEMANTINE HYDROCHLORIDE 5 MG/1
5 TABLET ORAL DAILY
Qty: 30 TABLET | Refills: 0 | Status: SHIPPED | OUTPATIENT
Start: 2023-05-04

## 2023-05-03 RX ORDER — MEMANTINE HYDROCHLORIDE 5 MG/1
5 TABLET ORAL DAILY
Status: DISCONTINUED | OUTPATIENT
Start: 2023-05-03 | End: 2023-05-03

## 2023-05-03 RX ORDER — CLONAZEPAM 1 MG/1
1 TABLET ORAL NIGHTLY
Qty: 30 TABLET | Refills: 5 | Status: SHIPPED | OUTPATIENT
Start: 2023-05-03 | End: 2023-05-04

## 2023-05-03 NOTE — CM/SW NOTE
05/03/23 1600   Discharge disposition   Expected discharge disposition [facility] Provider [facility]   Discharge transportation Private car         Received call from [facility] and they do not feel pt is strong enough to return to their supportive liviing- she is only walking 15 ft here. Pt can usually walk to meals from her apt. Pt and daughter agree that she is not strong enough to return to apt. PASSR done and referral sent to [facility]. RN to call report to [phone].     Aashish Blankenship LCSW  /Discharge Planner

## 2023-05-03 NOTE — PLAN OF CARE
Patient is A/O x1 with some confusion. Denies any abd pain and has not had BM. Abd xray done. Seizure and fall prec maintained. Needs addressed. Will continue to monitor.     Problem: GASTROINTESTINAL - ADULT  Goal: Minimal or absence of nausea and vomiting  Description: INTERVENTIONS:  - Maintain adequate hydration with IV or PO as ordered and tolerated  - Nasogastric tube to low intermittent suction as ordered  - Evaluate effectiveness of ordered antiemetic medications  - Provide nonpharmacologic comfort measures as appropriate  - Advance diet as tolerated, if ordered  - Obtain nutritional consult as needed  - Evaluate fluid balance  Outcome: Progressing  Goal: Maintains or returns to baseline bowel function  Description: INTERVENTIONS:  - Assess bowel function  - Maintain adequate hydration with IV or PO as ordered and tolerated  - Evaluate effectiveness of GI medications  - Encourage mobilization and activity  - Obtain nutritional consult as needed  - Establish a toileting routine/schedule  - Consider collaborating with pharmacy to review patient's medication profile  Outcome: Progressing     Problem: NEUROLOGICAL - ADULT  Goal: Absence of seizures  Description: INTERVENTIONS  - Monitor for seizure activity  - Administer anti-seizure medications as ordered  - Monitor neurological status  Outcome: Progressing

## 2023-05-03 NOTE — PROGRESS NOTES
NURSING DISCHARGE NOTE    Discharged Rehab facility via Wheelchair. Accompanied by Family member  Belongings Taken by patient/family. All belongings and discharge papers given to patient and mother. Mother will drive patient to [facility] skilled nursing facility until ready to return to her supportive living apartment at [facility]. Will need to be strong enough to walk more than 15 feet.

## 2023-08-15 ENCOUNTER — LAB REQUISITION (OUTPATIENT)
Dept: LAB | Facility: HOSPITAL | Age: 83
End: 2023-08-15
Payer: MEDICARE

## 2023-08-15 DIAGNOSIS — I10 ESSENTIAL (PRIMARY) HYPERTENSION: ICD-10-CM

## 2023-08-15 DIAGNOSIS — R60.0 LOCALIZED EDEMA: ICD-10-CM

## 2023-08-15 LAB
ALBUMIN SERPL-MCNC: 3.3 G/DL (ref 3.4–5)
ALBUMIN/GLOB SERPL: 1.1 {RATIO} (ref 1–2)
ALP LIVER SERPL-CCNC: 89 U/L
ALT SERPL-CCNC: 22 U/L
ANION GAP SERPL CALC-SCNC: 2 MMOL/L (ref 0–18)
AST SERPL-CCNC: 22 U/L (ref 15–37)
BASOPHILS # BLD AUTO: 0.05 X10(3) UL (ref 0–0.2)
BASOPHILS NFR BLD AUTO: 0.8 %
BILIRUB SERPL-MCNC: 0.3 MG/DL (ref 0.1–2)
BUN BLD-MCNC: 10 MG/DL (ref 7–18)
CALCIUM BLD-MCNC: 9 MG/DL (ref 8.5–10.1)
CHLORIDE SERPL-SCNC: 106 MMOL/L (ref 98–112)
CHOLEST SERPL-MCNC: 178 MG/DL (ref ?–200)
CO2 SERPL-SCNC: 28 MMOL/L (ref 21–32)
CREAT BLD-MCNC: 0.97 MG/DL
EGFRCR SERPLBLD CKD-EPI 2021: 59 ML/MIN/1.73M2 (ref 60–?)
EOSINOPHIL # BLD AUTO: 0.63 X10(3) UL (ref 0–0.7)
EOSINOPHIL NFR BLD AUTO: 10 %
ERYTHROCYTE [DISTWIDTH] IN BLOOD BY AUTOMATED COUNT: 12.4 %
FASTING PATIENT LIPID ANSWER: YES
FASTING STATUS PATIENT QL REPORTED: YES
FOLATE SERPL-MCNC: 38.3 NG/ML (ref 8.7–?)
GLOBULIN PLAS-MCNC: 2.9 G/DL (ref 2.8–4.4)
GLUCOSE BLD-MCNC: 79 MG/DL (ref 70–99)
HCT VFR BLD AUTO: 35.2 %
HDLC SERPL-MCNC: 98 MG/DL (ref 40–59)
HGB BLD-MCNC: 12.2 G/DL
IMM GRANULOCYTES # BLD AUTO: 0.01 X10(3) UL (ref 0–1)
IMM GRANULOCYTES NFR BLD: 0.2 %
LDLC SERPL CALC-MCNC: 72 MG/DL (ref ?–100)
LYMPHOCYTES # BLD AUTO: 2.07 X10(3) UL (ref 1–4)
LYMPHOCYTES NFR BLD AUTO: 32.7 %
MCH RBC QN AUTO: 32.5 PG (ref 26–34)
MCHC RBC AUTO-ENTMCNC: 34.7 G/DL (ref 31–37)
MCV RBC AUTO: 93.9 FL
MONOCYTES # BLD AUTO: 0.59 X10(3) UL (ref 0.1–1)
MONOCYTES NFR BLD AUTO: 9.3 %
NEUTROPHILS # BLD AUTO: 2.98 X10 (3) UL (ref 1.5–7.7)
NEUTROPHILS # BLD AUTO: 2.98 X10(3) UL (ref 1.5–7.7)
NEUTROPHILS NFR BLD AUTO: 47 %
NONHDLC SERPL-MCNC: 80 MG/DL (ref ?–130)
OSMOLALITY SERPL CALC.SUM OF ELEC: 280 MOSM/KG (ref 275–295)
PLATELET # BLD AUTO: 176 10(3)UL (ref 150–450)
POTASSIUM SERPL-SCNC: 3.9 MMOL/L (ref 3.5–5.1)
PROT SERPL-MCNC: 6.2 G/DL (ref 6.4–8.2)
RBC # BLD AUTO: 3.75 X10(6)UL
SODIUM SERPL-SCNC: 136 MMOL/L (ref 136–145)
TRIGL SERPL-MCNC: 37 MG/DL (ref 30–149)
TSI SER-ACNC: 1 MIU/ML (ref 0.36–3.74)
VIT B12 SERPL-MCNC: 317 PG/ML (ref 193–986)
VIT D+METAB SERPL-MCNC: 31.5 NG/ML (ref 30–100)
VLDLC SERPL CALC-MCNC: 6 MG/DL (ref 0–30)
WBC # BLD AUTO: 6.3 X10(3) UL (ref 4–11)

## 2023-08-15 PROCEDURE — 84443 ASSAY THYROID STIM HORMONE: CPT | Performed by: INTERNAL MEDICINE

## 2023-08-15 PROCEDURE — 82746 ASSAY OF FOLIC ACID SERUM: CPT | Performed by: INTERNAL MEDICINE

## 2023-08-15 PROCEDURE — 80061 LIPID PANEL: CPT | Performed by: INTERNAL MEDICINE

## 2023-08-15 PROCEDURE — 83036 HEMOGLOBIN GLYCOSYLATED A1C: CPT | Performed by: INTERNAL MEDICINE

## 2023-08-15 PROCEDURE — 82306 VITAMIN D 25 HYDROXY: CPT | Performed by: INTERNAL MEDICINE

## 2023-08-15 PROCEDURE — 85025 COMPLETE CBC W/AUTO DIFF WBC: CPT | Performed by: INTERNAL MEDICINE

## 2023-08-15 PROCEDURE — 82607 VITAMIN B-12: CPT | Performed by: INTERNAL MEDICINE

## 2023-08-15 PROCEDURE — 80053 COMPREHEN METABOLIC PANEL: CPT | Performed by: INTERNAL MEDICINE

## 2023-08-16 LAB
EST. AVERAGE GLUCOSE BLD GHB EST-MCNC: 114 MG/DL (ref 68–126)
HBA1C MFR BLD: 5.6 % (ref ?–5.7)

## 2023-09-12 ENCOUNTER — LAB REQUISITION (OUTPATIENT)
Dept: LAB | Facility: HOSPITAL | Age: 83
End: 2023-09-12
Payer: MEDICARE

## 2023-09-12 DIAGNOSIS — G40.909 EPILEPSY, UNSPECIFIED, NOT INTRACTABLE, WITHOUT STATUS EPILEPTICUS (HCC): ICD-10-CM

## 2023-09-12 PROCEDURE — 80186 ASSAY OF PHENYTOIN FREE: CPT | Performed by: INTERNAL MEDICINE

## 2023-09-12 PROCEDURE — 80339 ANTIEPILEPTICS NOS 1-3: CPT | Performed by: INTERNAL MEDICINE

## 2023-09-12 PROCEDURE — 80185 ASSAY OF PHENYTOIN TOTAL: CPT | Performed by: INTERNAL MEDICINE

## 2023-09-13 PROBLEM — H91.93 HEARING LOSS ASSOCIATED WITH SYNDROME OF BOTH EARS: Status: ACTIVE | Noted: 2023-09-06

## 2023-09-13 PROBLEM — L30.9 ECZEMA: Status: ACTIVE | Noted: 2023-09-13

## 2023-09-14 ENCOUNTER — LAB REQUISITION (OUTPATIENT)
Dept: LAB | Facility: HOSPITAL | Age: 83
End: 2023-09-14
Payer: MEDICARE

## 2023-09-14 DIAGNOSIS — Z92.89 PERSONAL HISTORY OF OTHER MEDICAL TREATMENT: ICD-10-CM

## 2023-09-14 PROCEDURE — 86480 TB TEST CELL IMMUN MEASURE: CPT | Performed by: INTERNAL MEDICINE

## 2023-09-14 PROCEDURE — 36415 COLL VENOUS BLD VENIPUNCTURE: CPT | Performed by: INTERNAL MEDICINE

## 2023-09-15 LAB
LACOSAMIDE: 3.6 UG/ML
PHENYTOIN, FREE, SERUM: 1.7 UG/ML
PHENYTOIN, SERUM: 21.9 UG/ML

## 2023-09-18 LAB
M TB IFN-G CD4+ T-CELLS BLD-ACNC: 0.05 IU/ML
M TB TUBERC IFN-G BLD QL: NEGATIVE
M TB TUBERC IGNF/MITOGEN IGNF CONTROL: 7.23 IU/ML
QFT TB1 AG MINUS NIL: 0 IU/ML
QFT TB2 AG MINUS NIL: 0 IU/ML

## 2023-09-26 ENCOUNTER — HOSPITAL ENCOUNTER (EMERGENCY)
Facility: HOSPITAL | Age: 83
Discharge: HOME | End: 2023-09-26
Attending: STUDENT IN AN ORGANIZED HEALTH CARE EDUCATION/TRAINING PROGRAM
Payer: MEDICARE

## 2023-09-26 VITALS
SYSTOLIC BLOOD PRESSURE: 170 MMHG | HEIGHT: 62 IN | HEART RATE: 68 BPM | WEIGHT: 170 LBS | OXYGEN SATURATION: 98 % | TEMPERATURE: 98 F | DIASTOLIC BLOOD PRESSURE: 80 MMHG | BODY MASS INDEX: 31.28 KG/M2 | RESPIRATION RATE: 14 BRPM

## 2023-09-26 DIAGNOSIS — N30.90 CYSTITIS: Primary | ICD-10-CM

## 2023-09-26 LAB
ALBUMIN SERPL-MCNC: 3.3 G/DL (ref 3.4–5)
ALBUMIN/GLOB SERPL: 0.8 {RATIO} (ref 1–2)
ALP LIVER SERPL-CCNC: 94 U/L
ALT SERPL-CCNC: 20 U/L
ANION GAP SERPL CALC-SCNC: 4 MMOL/L (ref 0–18)
AST SERPL-CCNC: 27 U/L (ref 15–37)
ATRIAL RATE: 65 BPM
BASOPHILS # BLD AUTO: 0.08 X10(3) UL (ref 0–0.2)
BASOPHILS NFR BLD AUTO: 0.8 %
BILIRUB SERPL-MCNC: 0.2 MG/DL (ref 0.1–2)
BILIRUB UR QL STRIP.AUTO: NEGATIVE
BUN BLD-MCNC: 8 MG/DL (ref 7–18)
CALCIUM BLD-MCNC: 8.9 MG/DL (ref 8.5–10.1)
CHLORIDE SERPL-SCNC: 108 MMOL/L (ref 98–112)
CO2 SERPL-SCNC: 26 MMOL/L (ref 21–32)
CREAT BLD-MCNC: 0.88 MG/DL
EGFRCR SERPLBLD CKD-EPI 2021: 66 ML/MIN/1.73M2 (ref 60–?)
EOSINOPHIL # BLD AUTO: 0.71 X10(3) UL (ref 0–0.7)
EOSINOPHIL NFR BLD AUTO: 7.4 %
ERYTHROCYTE [DISTWIDTH] IN BLOOD BY AUTOMATED COUNT: 12.6 %
GLOBULIN PLAS-MCNC: 3.9 G/DL (ref 2.8–4.4)
GLUCOSE BLD-MCNC: 94 MG/DL (ref 70–99)
GLUCOSE UR STRIP.AUTO-MCNC: NORMAL MG/DL
HCT VFR BLD AUTO: 39.3 %
HGB BLD-MCNC: 13.5 G/DL
IMM GRANULOCYTES # BLD AUTO: 0.03 X10(3) UL (ref 0–1)
IMM GRANULOCYTES NFR BLD: 0.3 %
KETONES UR STRIP.AUTO-MCNC: NEGATIVE MG/DL
LEUKOCYTE ESTERASE UR QL STRIP.AUTO: 500
LYMPHOCYTES # BLD AUTO: 2.21 X10(3) UL (ref 1–4)
LYMPHOCYTES NFR BLD AUTO: 23.2 %
MCH RBC QN AUTO: 32.8 PG (ref 26–34)
MCHC RBC AUTO-ENTMCNC: 34.4 G/DL (ref 31–37)
MCV RBC AUTO: 95.6 FL
MONOCYTES # BLD AUTO: 0.93 X10(3) UL (ref 0.1–1)
MONOCYTES NFR BLD AUTO: 9.7 %
NEUTROPHILS # BLD AUTO: 5.58 X10 (3) UL (ref 1.5–7.7)
NEUTROPHILS # BLD AUTO: 5.58 X10(3) UL (ref 1.5–7.7)
NEUTROPHILS NFR BLD AUTO: 58.6 %
NITRITE UR QL STRIP.AUTO: NEGATIVE
OSMOLALITY SERPL CALC.SUM OF ELEC: 284 MOSM/KG (ref 275–295)
P AXIS: 53 DEGREES
P-R INTERVAL: 160 MS
PH UR STRIP.AUTO: 6.5 [PH] (ref 5–8)
PLATELET # BLD AUTO: 198 10(3)UL (ref 150–450)
POTASSIUM SERPL-SCNC: 4 MMOL/L (ref 3.5–5.1)
PROT SERPL-MCNC: 7.2 G/DL (ref 6.4–8.2)
PROT UR STRIP.AUTO-MCNC: NEGATIVE MG/DL
Q-T INTERVAL: 396 MS
QRS DURATION: 86 MS
QTC CALCULATION (BEZET): 411 MS
R AXIS: -12 DEGREES
RBC # BLD AUTO: 4.11 X10(6)UL
RBC UR QL AUTO: NEGATIVE
SODIUM SERPL-SCNC: 138 MMOL/L (ref 136–145)
SP GR UR STRIP.AUTO: 1.01 (ref 1–1.03)
T AXIS: 45 DEGREES
UROBILINOGEN UR STRIP.AUTO-MCNC: NORMAL MG/DL
VENTRICULAR RATE: 65 BPM
WBC # BLD AUTO: 9.5 X10(3) UL (ref 4–11)
WBC #/AREA URNS AUTO: >50 /HPF

## 2023-09-26 PROCEDURE — 93005 ELECTROCARDIOGRAM TRACING: CPT

## 2023-09-26 PROCEDURE — 81001 URINALYSIS AUTO W/SCOPE: CPT | Performed by: STUDENT IN AN ORGANIZED HEALTH CARE EDUCATION/TRAINING PROGRAM

## 2023-09-26 PROCEDURE — 85025 COMPLETE CBC W/AUTO DIFF WBC: CPT | Performed by: STUDENT IN AN ORGANIZED HEALTH CARE EDUCATION/TRAINING PROGRAM

## 2023-09-26 PROCEDURE — 99284 EMERGENCY DEPT VISIT MOD MDM: CPT

## 2023-09-26 PROCEDURE — 80053 COMPREHEN METABOLIC PANEL: CPT | Performed by: STUDENT IN AN ORGANIZED HEALTH CARE EDUCATION/TRAINING PROGRAM

## 2023-09-26 PROCEDURE — 96365 THER/PROPH/DIAG IV INF INIT: CPT

## 2023-09-26 PROCEDURE — 80053 COMPREHEN METABOLIC PANEL: CPT

## 2023-09-26 PROCEDURE — 85025 COMPLETE CBC W/AUTO DIFF WBC: CPT

## 2023-09-26 PROCEDURE — 87086 URINE CULTURE/COLONY COUNT: CPT | Performed by: STUDENT IN AN ORGANIZED HEALTH CARE EDUCATION/TRAINING PROGRAM

## 2023-09-26 PROCEDURE — 93010 ELECTROCARDIOGRAM REPORT: CPT

## 2023-09-26 RX ORDER — CEPHALEXIN 500 MG/1
500 CAPSULE ORAL 2 TIMES DAILY
Qty: 14 CAPSULE | Refills: 0 | Status: SHIPPED | OUTPATIENT
Start: 2023-09-27 | End: 2023-10-04

## 2023-09-26 NOTE — ED QUICK NOTES
1620: Report given to Annabel Caceres at [facility]. 36: Spoke with daughter, updated on plan of care. 1635: [facility] ETA 1830 for transport back to [facility].

## 2023-11-15 ENCOUNTER — HOSPITAL ENCOUNTER (OUTPATIENT)
Dept: ULTRASOUND IMAGING | Age: 83
Discharge: HOME | End: 2023-11-15
Attending: NURSE PRACTITIONER
Payer: MEDICARE

## 2023-11-15 DIAGNOSIS — R42 DIZZINESS AND GIDDINESS: ICD-10-CM

## 2023-11-15 PROCEDURE — 93880 EXTRACRANIAL BILAT STUDY: CPT | Performed by: NURSE PRACTITIONER

## 2023-12-06 PROBLEM — S09.90XA INJURY OF HEAD, INITIAL ENCOUNTER: Status: RESOLVED | Noted: 2022-10-19 | Resolved: 2023-12-06

## 2024-02-19 ENCOUNTER — HOSPITAL ENCOUNTER (EMERGENCY)
Facility: HOSPITAL | Age: 84
Discharge: HOME | End: 2024-02-19
Attending: EMERGENCY MEDICINE
Payer: MEDICARE

## 2024-02-19 ENCOUNTER — APPOINTMENT (OUTPATIENT)
Dept: CT IMAGING | Facility: HOSPITAL | Age: 84
End: 2024-02-19
Attending: EMERGENCY MEDICINE
Payer: MEDICARE

## 2024-02-19 VITALS
DIASTOLIC BLOOD PRESSURE: 80 MMHG | BODY MASS INDEX: 31.28 KG/M2 | TEMPERATURE: 98 F | RESPIRATION RATE: 18 BRPM | SYSTOLIC BLOOD PRESSURE: 157 MMHG | WEIGHT: 170 LBS | HEIGHT: 62 IN | HEART RATE: 67 BPM | OXYGEN SATURATION: 100 %

## 2024-02-19 DIAGNOSIS — N30.01 ACUTE CYSTITIS WITH HEMATURIA: ICD-10-CM

## 2024-02-19 DIAGNOSIS — S00.01XA ABRASION OF SCALP, INITIAL ENCOUNTER: ICD-10-CM

## 2024-02-19 DIAGNOSIS — S09.8XXA BLUNT HEAD TRAUMA, INITIAL ENCOUNTER: Primary | ICD-10-CM

## 2024-02-19 DIAGNOSIS — S16.1XXA STRAIN OF NECK MUSCLE, INITIAL ENCOUNTER: ICD-10-CM

## 2024-02-19 LAB
BILIRUB UR QL STRIP.AUTO: NEGATIVE
GLUCOSE UR STRIP.AUTO-MCNC: NORMAL MG/DL
HYALINE CASTS #/AREA URNS AUTO: PRESENT /LPF
KETONES UR STRIP.AUTO-MCNC: NEGATIVE MG/DL
LEUKOCYTE ESTERASE UR QL STRIP.AUTO: 500
NITRITE UR QL STRIP.AUTO: NEGATIVE
PH UR STRIP.AUTO: 6.5 [PH] (ref 5–8)
PROT UR STRIP.AUTO-MCNC: NEGATIVE MG/DL
RBC UR QL AUTO: NEGATIVE
SP GR UR STRIP.AUTO: 1.01 (ref 1–1.03)
UROBILINOGEN UR STRIP.AUTO-MCNC: NORMAL MG/DL
WBC #/AREA URNS AUTO: >50 /HPF
WBC CLUMPS UR QL AUTO: PRESENT /HPF

## 2024-02-19 PROCEDURE — 81001 URINALYSIS AUTO W/SCOPE: CPT | Performed by: EMERGENCY MEDICINE

## 2024-02-19 PROCEDURE — 72125 CT NECK SPINE W/O DYE: CPT | Performed by: EMERGENCY MEDICINE

## 2024-02-19 PROCEDURE — 87086 URINE CULTURE/COLONY COUNT: CPT | Performed by: EMERGENCY MEDICINE

## 2024-02-19 PROCEDURE — 99284 EMERGENCY DEPT VISIT MOD MDM: CPT

## 2024-02-19 PROCEDURE — 70450 CT HEAD/BRAIN W/O DYE: CPT | Performed by: EMERGENCY MEDICINE

## 2024-02-19 RX ORDER — NITROFURANTOIN 25; 75 MG/1; MG/1
100 CAPSULE ORAL 2 TIMES DAILY
Qty: 20 CAPSULE | Refills: 0 | Status: SHIPPED | OUTPATIENT
Start: 2024-02-19 | End: 2024-02-29

## 2024-02-19 NOTE — ED QUICK NOTES
Patient ambulated to bathroom with mild assistant.  She has a unsteady gait and usually walks with a walker per patient.

## 2024-02-19 NOTE — ED PROVIDER NOTES
Patient Seen in: [facility] Emergency Department      History     Chief Complaint   Patient presents with    Fall     Witnessed fall at SNF struck back of head. No LOC. No thinners. Refusing C-collar.     Stated Complaint: Witnessed fall at SNF struck back of head. No LOC. No thinners.  Refusing C-col*    Subjective:   HPI    Patient is an 84-year-old female presents emergency room with a history of slipping and falling when she was moving her wheelchair trying to  an orange that had fallen out of her refrigerator and she slipped and fell back hitting the back of her head.  The patient did not lose consciousness.  Patient complains of some discomfort to the back of her head where she hit.  The patient has some mild discomfort in her neck but refused cervical collar both en route and here in the emergency room.  Patient denies history of any weakness or numbness to the arms or legs.  Patient denies history of any precipitating factors to her fall but states that she just slipped.  Patient denies history of any other somatic complaints or discomfort at this time.  Objective:   Past Medical History:   Diagnosis Date    Anxiety state     Atherosclerosis of coronary artery     Coronary atherosclerosis     Dementia (HCC)     Dementia without behavioral disturbance (HCC)     Disorder of thyroid     Esophageal reflux     Essential hypertension     GERD without esophagitis     Hearing impairment     left side partial hearing loss    High blood pressure     High cholesterol     History of blood clots     Hyperlipidemia     Hypertensive chronic kidney disease     Hypothyroidism     Incontinence     Muscle weakness     Personal history of venous thrombosis and embolism     Pneumonia due to organism     Presence of coronary angioplasty implant and graft     Psoriasis     Renal disorder     Seizure disorder (HCC)     Visual impairment     Vitamin D deficiency               Past Surgical History:   Procedure  Laterality Date    CATH BARE METAL STENT (BMS)  05/03/2011    xience V    COLONOSCOPY      HYSTERECTOMY      TOTAL KNEE REPLACEMENT      Left knee                Social History     Socioeconomic History    Marital status:    Tobacco Use    Smoking status: Never    Smokeless tobacco: Never   Substance and Sexual Activity    Alcohol use: Not Currently    Drug use: Never              Review of Systems    Positive for stated complaint: Witnessed fall at SNF struck back of head. No LOC. No thinners.  Refusing C-col*  Other systems are as noted in HPI.  Constitutional and vital signs reviewed.      All other systems reviewed and negative except as noted above.    Physical Exam     ED Triage Vitals [02/19/24 1302]   /89   Pulse 68   Resp 18   Temp 97.6 °F (36.4 °C)   Temp src Temporal   SpO2 100 %   O2 Device None (Room air)       Current:/80   Pulse 67   Temp 97.6 °F (36.4 °C) (Temporal)   Resp 18   Ht 157.5 cm (5' 2\")   Wt 77.1 kg   SpO2 100%   BMI 31.09 kg/m²         Physical Exam  GENERAL: Well-developed, well-nourished female sitting up breathing easily in no apparent distress.  Patient is nontoxic in appearance.  HEENT: Head is normocephalic.  There is a small superficial abrasion noted to the left occipital aspect of the scalp with no deep laceration appreciated.  Pupils are 4 mm equally round and reactive to light. Oropharynx is clear. Mucous membranes are moist.  NECK: There is mild focal tenderness to palpation appreciated to the paraspinal area of the cervical spine with no focal midline tenderness to palpation appreciated.   LUNGS: Clear to auscultation bilaterally with no wheeze. There is good equal air entry bilaterally.  HEART: Regular rate and rhythm. Normal S1, S2 no S3, or S4. No murmur.  ABDOMEN: There is no focal tenderness to palpation appreciated anywhere throughout the abdomen. There is no guarding, no rebound, no mass, and no organomegaly appreciated. There is normoactive  bowel sounds. EXTREMITIES: There is no cyanosis, clubbing, however there is chronic appearing edema appreciated in both lower extremities. Pulses are 2+ and equal in all 4 extremities.  NEURO: Patient is awake, alert and oriented to time place and person. Motor strength is 5 over 5 in all 4 extremities. There are no gross motor or sensory deficits appreciated. Cranial nerves II through XII are intact.  Patient is answering all questions appropriately.           ED Course     Labs Reviewed   URINALYSIS WITH CULTURE REFLEX - Abnormal; Notable for the following components:       Result Value    Clarity Urine Turbid (*)     Leukocyte Esterase Urine 500 (*)     WBC Urine >50 (*)     RBC Urine 6-10 (*)     Bacteria Urine 1+ (*)     Squamous Epi. Cells Few (*)     Hyaline Casts Present (*)     WBC Clump Present (*)     All other components within normal limits   URINE CULTURE, ROUTINE     I personally reviewed the patient's CT scan of the brain and my individual interpretation shows no evidence of any acute fracture or intracerebral bleed.  I also reviewed official radiology report which showed results as noted below.        CT SPINE CERVICAL (CPT=72125)    Result Date: 2/19/2024  CONCLUSION:  1. No acute process. 2. Mild degenerative disc and facet changes, with suggestion of secondary mild bilateral neural foraminal stenosis at C5-6.  Please correlate clinically.    LOCATION:  [facility]   Dictated by (CST): Oh Goodman DO on 2/19/2024 at 3:00 PM     Finalized by (CST): Oh Goodman DO on 2/19/2024 at 3:04 PM       CT BRAIN OR HEAD (54990)    Result Date: 2/19/2024  CONCLUSION:  1. No acute process. 2. Stable atrophy and white matter disease consistent with chronic small vessel ischemic changes.    LOCATION:  [facility]   Dictated by (CST): Oh Goodman DO on 2/19/2024 at 2:48 PM     Finalized by (CST): Oh Goodman DO on 2/19/2024 at 2:50 PM               MDM          14:34 patient sitting back and breathing easily in no  apparent distress this time.  Patient resting comfortably at this time.  Patient with no further bleeding from the scalp appreciated.  Awaiting CT reports at this time.  15:06 patient sitting up and tolerating water at this time.  Patient with no other new complaints at this time.  Will continue to observe at this time    Patient underwent CT scan of the head and cervical spine with results as noted above.  Patient is sitting back and breathing easily in no apparent distress at this time.  Patient does have a small abrasion which was cleaned here in the emergency room.  There is no evidence of any gaping laceration appreciated on initial exam or repeat examination here in the ER.  Patient's daughter was concerned the patient may have a UTI and a urinalysis was done which did confirm the patient does have a UTI.  Patient specific gravity was normal.  Patient will be discharged home at this time.  Patient was observed for some time in the ER remained awake, alert, and appropriate throughout the rest of the ER stay.  The patient will be treated with Macrobid for home instructions to encourage fluids and rest at home to apply ice to areas of pain at home take Tylenol for pain at home to return to the ER immediately if symptoms worsen or if any other problems arise.  Patient discharged home at this time.                             Medical Decision Making      Disposition and Plan     Clinical Impression:  1. Blunt head trauma, initial encounter    2. Abrasion of scalp, initial encounter    3. Strain of neck muscle, initial encounter    4. Acute cystitis with hematuria         Disposition:  Discharge  2/19/2024  3:58 pm    Follow-up:  Alondra Burgess MD  [address] [place] [id number]  [phone]    Follow up in 2 day(s)            Medications Prescribed:  Current Discharge Medication List        START taking these medications    Details   nitrofurantoin monohydrate macro 100 MG Oral Cap Take 1 capsule  (100 mg total) by mouth 2 (two) times daily for 10 days.  Qty: 20 capsule, Refills: 0

## 2024-02-19 NOTE — DISCHARGE INSTRUCTIONS
Rest at home   Tylenol for pain at home  Ice to areas of pain at home  Return to the ER if symptoms worsen or if any other problems arise

## 2024-02-27 ENCOUNTER — LAB REQUISITION (OUTPATIENT)
Dept: LAB | Facility: HOSPITAL | Age: 84
End: 2024-02-27
Payer: MEDICARE

## 2024-02-27 DIAGNOSIS — G40.219 LOCALIZATION-RELATED (FOCAL) (PARTIAL) SYMPTOMATIC EPILEPSY AND EPILEPTIC SYNDROMES WITH COMPLEX PARTIAL SEIZURES, INTRACTABLE, WITHOUT STATUS EPILEPTICUS (HCC): ICD-10-CM

## 2024-02-27 DIAGNOSIS — E03.9 HYPOTHYROIDISM, UNSPECIFIED: ICD-10-CM

## 2024-02-27 DIAGNOSIS — R59.0 LOCALIZED ENLARGED LYMPH NODES: ICD-10-CM

## 2024-02-27 LAB
ALBUMIN SERPL-MCNC: 3 G/DL (ref 3.4–5)
ALBUMIN/GLOB SERPL: 1 {RATIO} (ref 1–2)
ALP LIVER SERPL-CCNC: 88 U/L
ALT SERPL-CCNC: 15 U/L
ANION GAP SERPL CALC-SCNC: 2 MMOL/L (ref 0–18)
AST SERPL-CCNC: 23 U/L (ref 15–37)
BILIRUB SERPL-MCNC: 0.2 MG/DL (ref 0.1–2)
BUN BLD-MCNC: 14 MG/DL (ref 9–23)
CALCIUM BLD-MCNC: 8.9 MG/DL (ref 8.5–10.1)
CHLORIDE SERPL-SCNC: 111 MMOL/L (ref 98–112)
CO2 SERPL-SCNC: 28 MMOL/L (ref 21–32)
CREAT BLD-MCNC: 0.74 MG/DL
EGFRCR SERPLBLD CKD-EPI 2021: 81 ML/MIN/1.73M2 (ref 60–?)
ERYTHROCYTE [DISTWIDTH] IN BLOOD BY AUTOMATED COUNT: 12.9 %
FASTING STATUS PATIENT QL REPORTED: YES
GLOBULIN PLAS-MCNC: 3 G/DL (ref 2.8–4.4)
GLUCOSE BLD-MCNC: 87 MG/DL (ref 70–99)
HCT VFR BLD AUTO: 33.2 %
HGB BLD-MCNC: 11.5 G/DL
MCH RBC QN AUTO: 33.4 PG (ref 26–34)
MCHC RBC AUTO-ENTMCNC: 34.6 G/DL (ref 31–37)
MCV RBC AUTO: 96.5 FL
OSMOLALITY SERPL CALC.SUM OF ELEC: 292 MOSM/KG (ref 275–295)
PHENYTOIN SERPL-MCNC: 26.7 UG/ML (ref 10–20)
PLATELET # BLD AUTO: 173 10(3)UL (ref 150–450)
POTASSIUM SERPL-SCNC: 4.3 MMOL/L (ref 3.5–5.1)
PROT SERPL-MCNC: 6 G/DL (ref 6.4–8.2)
RBC # BLD AUTO: 3.44 X10(6)UL
SODIUM SERPL-SCNC: 141 MMOL/L (ref 136–145)
TSI SER-ACNC: 1.18 MIU/ML (ref 0.36–3.74)
WBC # BLD AUTO: 6.8 X10(3) UL (ref 4–11)

## 2024-02-27 PROCEDURE — 80053 COMPREHEN METABOLIC PANEL: CPT | Performed by: INTERNAL MEDICINE

## 2024-02-27 PROCEDURE — 85027 COMPLETE CBC AUTOMATED: CPT | Performed by: INTERNAL MEDICINE

## 2024-02-27 PROCEDURE — 80339 ANTIEPILEPTICS NOS 1-3: CPT | Performed by: INTERNAL MEDICINE

## 2024-02-27 PROCEDURE — 80185 ASSAY OF PHENYTOIN TOTAL: CPT | Performed by: INTERNAL MEDICINE

## 2024-02-27 PROCEDURE — 84443 ASSAY THYROID STIM HORMONE: CPT | Performed by: INTERNAL MEDICINE

## 2024-03-01 LAB — LACOSAMIDE: 4.1 UG/ML

## 2024-03-14 ENCOUNTER — LAB REQUISITION (OUTPATIENT)
Dept: LAB | Facility: HOSPITAL | Age: 84
End: 2024-03-14
Payer: MEDICARE

## 2024-03-14 DIAGNOSIS — G40.909 EPILEPSY, UNSPECIFIED, NOT INTRACTABLE, WITHOUT STATUS EPILEPTICUS (HCC): ICD-10-CM

## 2024-03-14 DIAGNOSIS — R56.9 UNSPECIFIED CONVULSIONS (HCC): ICD-10-CM

## 2024-03-14 LAB — PHENYTOIN SERPL-MCNC: 21.9 UG/ML (ref 10–20)

## 2024-03-14 PROCEDURE — 80185 ASSAY OF PHENYTOIN TOTAL: CPT | Performed by: INTERNAL MEDICINE

## 2024-04-02 ENCOUNTER — LAB REQUISITION (OUTPATIENT)
Dept: LAB | Facility: HOSPITAL | Age: 84
End: 2024-04-02
Payer: MEDICARE

## 2024-04-02 DIAGNOSIS — R56.9 UNSPECIFIED CONVULSIONS (HCC): ICD-10-CM

## 2024-04-02 DIAGNOSIS — G40.219 LOCALIZATION-RELATED (FOCAL) (PARTIAL) SYMPTOMATIC EPILEPSY AND EPILEPTIC SYNDROMES WITH COMPLEX PARTIAL SEIZURES, INTRACTABLE, WITHOUT STATUS EPILEPTICUS (HCC): ICD-10-CM

## 2024-04-02 LAB — PHENYTOIN SERPL-MCNC: 19.8 UG/ML (ref 10–20)

## 2024-04-02 PROCEDURE — 80185 ASSAY OF PHENYTOIN TOTAL: CPT | Performed by: INTERNAL MEDICINE

## 2024-04-17 PROBLEM — R60.0 PEDAL EDEMA: Status: ACTIVE | Noted: 2024-04-17

## 2024-05-02 ENCOUNTER — LAB REQUISITION (OUTPATIENT)
Dept: LAB | Facility: HOSPITAL | Age: 84
End: 2024-05-02
Payer: MEDICARE

## 2024-05-02 DIAGNOSIS — R56.9 UNSPECIFIED CONVULSIONS (HCC): ICD-10-CM

## 2024-05-02 DIAGNOSIS — G40.219 LOCALIZATION-RELATED (FOCAL) (PARTIAL) SYMPTOMATIC EPILEPSY AND EPILEPTIC SYNDROMES WITH COMPLEX PARTIAL SEIZURES, INTRACTABLE, WITHOUT STATUS EPILEPTICUS (HCC): ICD-10-CM

## 2024-05-02 LAB — PHENYTOIN SERPL-MCNC: 17.5 UG/ML (ref 10–20)

## 2024-05-02 PROCEDURE — 80185 ASSAY OF PHENYTOIN TOTAL: CPT | Performed by: INTERNAL MEDICINE

## 2024-05-15 PROBLEM — Z51.81 MEDICATION MONITORING ENCOUNTER: Status: RESOLVED | Noted: 2021-09-21 | Resolved: 2024-05-15

## 2024-07-02 ENCOUNTER — LAB REQUISITION (OUTPATIENT)
Dept: LAB | Facility: HOSPITAL | Age: 84
End: 2024-07-02
Payer: MEDICARE

## 2024-07-02 DIAGNOSIS — G40.219 LOCALIZATION-RELATED (FOCAL) (PARTIAL) SYMPTOMATIC EPILEPSY AND EPILEPTIC SYNDROMES WITH COMPLEX PARTIAL SEIZURES, INTRACTABLE, WITHOUT STATUS EPILEPTICUS (HCC): ICD-10-CM

## 2024-07-02 DIAGNOSIS — R56.9 UNSPECIFIED CONVULSIONS (HCC): ICD-10-CM

## 2024-07-02 LAB — PHENYTOIN SERPL-MCNC: 17.7 UG/ML (ref 10–20)

## 2024-07-02 PROCEDURE — 80185 ASSAY OF PHENYTOIN TOTAL: CPT | Performed by: INTERNAL MEDICINE

## 2024-09-12 ENCOUNTER — LAB REQUISITION (OUTPATIENT)
Dept: LAB | Facility: HOSPITAL | Age: 84
End: 2024-09-12
Payer: MEDICARE

## 2024-09-12 DIAGNOSIS — R76.11 NONSPECIFIC REACTION TO TUBERCULIN SKIN TEST WITHOUT ACTIVE TUBERCULOSIS: ICD-10-CM

## 2024-09-12 PROCEDURE — 86480 TB TEST CELL IMMUN MEASURE: CPT | Performed by: INTERNAL MEDICINE

## 2024-09-13 LAB
M TB IFN-G CD4+ T-CELLS BLD-ACNC: 0 IU/ML
M TB TUBERC IFN-G BLD QL: NEGATIVE
M TB TUBERC IGNF/MITOGEN IGNF CONTROL: 4.38 IU/ML
QFT TB1 AG MINUS NIL: 0 IU/ML
QFT TB2 AG MINUS NIL: 0.01 IU/ML

## 2024-10-04 ENCOUNTER — LAB REQUISITION (OUTPATIENT)
Dept: LAB | Facility: HOSPITAL | Age: 84
End: 2024-10-04
Payer: MEDICARE

## 2024-10-04 DIAGNOSIS — G40.219 LOCALIZATION-RELATED (FOCAL) (PARTIAL) SYMPTOMATIC EPILEPSY AND EPILEPTIC SYNDROMES WITH COMPLEX PARTIAL SEIZURES, INTRACTABLE, WITHOUT STATUS EPILEPTICUS (HCC): ICD-10-CM

## 2024-10-04 LAB — PHENYTOIN SERPL-MCNC: 19 UG/ML (ref 10–20)

## 2024-10-04 PROCEDURE — 80185 ASSAY OF PHENYTOIN TOTAL: CPT | Performed by: INTERNAL MEDICINE

## 2024-10-21 ENCOUNTER — NURSE ONLY (OUTPATIENT)
Dept: ELECTROPHYSIOLOGY | Facility: HOSPITAL | Age: 84
DRG: 101 | End: 2024-10-21
Attending: INTERNAL MEDICINE
Payer: MEDICARE

## 2024-10-21 ENCOUNTER — HOSPITAL ENCOUNTER (INPATIENT)
Facility: HOSPITAL | Age: 84
LOS: 2 days | Discharge: HOME HEALTH | End: 2024-10-23
Attending: EMERGENCY MEDICINE | Admitting: INTERNAL MEDICINE
Payer: MEDICARE

## 2024-10-21 ENCOUNTER — APPOINTMENT (OUTPATIENT)
Dept: GENERAL RADIOLOGY | Facility: HOSPITAL | Age: 84
End: 2024-10-21
Attending: EMERGENCY MEDICINE
Payer: MEDICARE

## 2024-10-21 ENCOUNTER — APPOINTMENT (OUTPATIENT)
Dept: CT IMAGING | Facility: HOSPITAL | Age: 84
End: 2024-10-21
Attending: EMERGENCY MEDICINE
Payer: MEDICARE

## 2024-10-21 ENCOUNTER — NURSE ONLY (OUTPATIENT)
Dept: ELECTROPHYSIOLOGY | Facility: HOSPITAL | Age: 84
End: 2024-10-21
Attending: INTERNAL MEDICINE
Payer: MEDICARE

## 2024-10-21 DIAGNOSIS — G40.909 RECURRENT SEIZURES (HCC): Primary | ICD-10-CM

## 2024-10-21 DIAGNOSIS — R19.7 DIARRHEA, UNSPECIFIED TYPE: ICD-10-CM

## 2024-10-21 DIAGNOSIS — E87.1 HYPONATREMIA: ICD-10-CM

## 2024-10-21 LAB
ALBUMIN SERPL-MCNC: 4.4 G/DL (ref 3.2–4.8)
ALBUMIN/GLOB SERPL: 1.6 {RATIO} (ref 1–2)
ALP LIVER SERPL-CCNC: 95 U/L
ALT SERPL-CCNC: 18 U/L
ANION GAP SERPL CALC-SCNC: 7 MMOL/L (ref 0–18)
AST SERPL-CCNC: 27 U/L (ref ?–34)
ATRIAL RATE: 97 BPM
BASOPHILS # BLD AUTO: 0.02 X10(3) UL (ref 0–0.2)
BASOPHILS NFR BLD AUTO: 0.1 %
BILIRUB SERPL-MCNC: 0.5 MG/DL (ref 0.2–1.1)
BILIRUB UR QL STRIP.AUTO: NEGATIVE
BUN BLD-MCNC: 6 MG/DL (ref 9–23)
C DIFF TOX B STL QL: NEGATIVE
CALCIUM BLD-MCNC: 9.9 MG/DL (ref 8.7–10.4)
CHLORIDE SERPL-SCNC: 94 MMOL/L (ref 98–112)
CO2 SERPL-SCNC: 25 MMOL/L (ref 21–32)
COLOR UR AUTO: YELLOW
CREAT BLD-MCNC: 0.75 MG/DL
EGFRCR SERPLBLD CKD-EPI 2021: 79 ML/MIN/1.73M2 (ref 60–?)
EOSINOPHIL # BLD AUTO: 0.05 X10(3) UL (ref 0–0.7)
EOSINOPHIL NFR BLD AUTO: 0.3 %
ERYTHROCYTE [DISTWIDTH] IN BLOOD BY AUTOMATED COUNT: 11.9 %
GLOBULIN PLAS-MCNC: 2.8 G/DL (ref 2–3.5)
GLUCOSE BLD-MCNC: 126 MG/DL (ref 70–99)
GLUCOSE BLD-MCNC: 147 MG/DL (ref 70–99)
GLUCOSE BLD-MCNC: 150 MG/DL (ref 70–99)
GLUCOSE UR STRIP.AUTO-MCNC: NORMAL MG/DL
HCT VFR BLD AUTO: 37.8 %
HGB BLD-MCNC: 13.7 G/DL
HYALINE CASTS #/AREA URNS AUTO: PRESENT /LPF
IMM GRANULOCYTES # BLD AUTO: 0.06 X10(3) UL (ref 0–1)
IMM GRANULOCYTES NFR BLD: 0.4 %
KETONES UR STRIP.AUTO-MCNC: NEGATIVE MG/DL
LEUKOCYTE ESTERASE UR QL STRIP.AUTO: 500
LYMPHOCYTES # BLD AUTO: 0.71 X10(3) UL (ref 1–4)
LYMPHOCYTES NFR BLD AUTO: 4.9 %
MAGNESIUM SERPL-MCNC: 1.6 MG/DL (ref 1.6–2.6)
MCH RBC QN AUTO: 33.6 PG (ref 26–34)
MCHC RBC AUTO-ENTMCNC: 36.2 G/DL (ref 31–37)
MCV RBC AUTO: 92.6 FL
MONOCYTES # BLD AUTO: 1.11 X10(3) UL (ref 0.1–1)
MONOCYTES NFR BLD AUTO: 7.7 %
MRSA DNA SPEC QL NAA+PROBE: NEGATIVE
NEUTROPHILS # BLD AUTO: 12.52 X10 (3) UL (ref 1.5–7.7)
NEUTROPHILS # BLD AUTO: 12.52 X10(3) UL (ref 1.5–7.7)
NEUTROPHILS NFR BLD AUTO: 86.6 %
NITRITE UR QL STRIP.AUTO: NEGATIVE
OSMOLALITY SERPL CALC.SUM OF ELEC: 262 MOSM/KG (ref 275–295)
P AXIS: 53 DEGREES
P-R INTERVAL: 176 MS
PH UR STRIP.AUTO: 6.5 [PH] (ref 5–8)
PHENYTOIN SERPL-MCNC: 21.7 UG/ML (ref 10–20)
PLATELET # BLD AUTO: 168 10(3)UL (ref 150–450)
POTASSIUM SERPL-SCNC: 4.2 MMOL/L (ref 3.5–5.1)
PROT SERPL-MCNC: 7.2 G/DL (ref 5.7–8.2)
PROT UR STRIP.AUTO-MCNC: 20 MG/DL
Q-T INTERVAL: 334 MS
QRS DURATION: 82 MS
QTC CALCULATION (BEZET): 424 MS
R AXIS: -17 DEGREES
RBC # BLD AUTO: 4.08 X10(6)UL
SODIUM SERPL-SCNC: 126 MMOL/L (ref 136–145)
SODIUM SERPL-SCNC: 128 MMOL/L (ref 136–145)
SODIUM SERPL-SCNC: 131 MMOL/L (ref 136–145)
SP GR UR STRIP.AUTO: 1.01 (ref 1–1.03)
T AXIS: 46 DEGREES
T3FREE SERPL-MCNC: 2.69 PG/ML (ref 2.4–4.2)
T4 FREE SERPL-MCNC: 1.7 NG/DL (ref 0.8–1.7)
TSI SER-ACNC: 0.32 MIU/ML (ref 0.55–4.78)
URATE SERPL-MCNC: 4.3 MG/DL
UROBILINOGEN UR STRIP.AUTO-MCNC: NORMAL MG/DL
VENTRICULAR RATE: 97 BPM
WBC # BLD AUTO: 14.5 X10(3) UL (ref 4–11)
WBC #/AREA URNS AUTO: >50 /HPF
WBC CLUMPS UR QL AUTO: PRESENT /HPF

## 2024-10-21 PROCEDURE — 99291 CRITICAL CARE FIRST HOUR: CPT | Performed by: INTERNAL MEDICINE

## 2024-10-21 PROCEDURE — 99223 1ST HOSP IP/OBS HIGH 75: CPT | Performed by: INTERNAL MEDICINE

## 2024-10-21 PROCEDURE — 99292 CRITICAL CARE ADDL 30 MIN: CPT | Performed by: INTERNAL MEDICINE

## 2024-10-21 PROCEDURE — 71045 X-RAY EXAM CHEST 1 VIEW: CPT | Performed by: EMERGENCY MEDICINE

## 2024-10-21 PROCEDURE — 70450 CT HEAD/BRAIN W/O DYE: CPT | Performed by: EMERGENCY MEDICINE

## 2024-10-21 RX ORDER — LORAZEPAM 2 MG/ML
2 INJECTION INTRAMUSCULAR ONCE
Status: COMPLETED | OUTPATIENT
Start: 2024-10-21 | End: 2024-10-21

## 2024-10-21 RX ORDER — LACOSAMIDE 10 MG/ML
200 INJECTION, SOLUTION INTRAVENOUS ONCE
Status: COMPLETED | OUTPATIENT
Start: 2024-10-21 | End: 2024-10-21

## 2024-10-21 RX ORDER — MAGNESIUM SULFATE HEPTAHYDRATE 40 MG/ML
2 INJECTION, SOLUTION INTRAVENOUS ONCE
Status: COMPLETED | OUTPATIENT
Start: 2024-10-21 | End: 2024-10-21

## 2024-10-21 RX ORDER — LORAZEPAM 2 MG/ML
1 INJECTION INTRAMUSCULAR ONCE
Status: COMPLETED | OUTPATIENT
Start: 2024-10-21 | End: 2024-10-21

## 2024-10-21 RX ORDER — LORAZEPAM 2 MG/ML
INJECTION INTRAMUSCULAR
Status: COMPLETED
Start: 2024-10-21 | End: 2024-10-21

## 2024-10-21 RX ORDER — ENOXAPARIN SODIUM 100 MG/ML
40 INJECTION SUBCUTANEOUS DAILY
Status: DISCONTINUED | OUTPATIENT
Start: 2024-10-21 | End: 2024-10-23

## 2024-10-21 RX ORDER — SODIUM CHLORIDE 9 MG/ML
INJECTION, SOLUTION INTRAVENOUS CONTINUOUS
Status: DISCONTINUED | OUTPATIENT
Start: 2024-10-21 | End: 2024-10-21

## 2024-10-21 RX ORDER — LORAZEPAM 2 MG/ML
1 INJECTION INTRAMUSCULAR ONCE
Status: DISCONTINUED | OUTPATIENT
Start: 2024-10-21 | End: 2024-10-21

## 2024-10-21 RX ORDER — LACOSAMIDE 10 MG/ML
100 INJECTION, SOLUTION INTRAVENOUS EVERY 12 HOURS
Status: DISCONTINUED | OUTPATIENT
Start: 2024-10-21 | End: 2024-10-21

## 2024-10-21 RX ORDER — LEVOTHYROXINE SODIUM 20 UG/ML
87.5 INJECTION, SOLUTION INTRAVENOUS DAILY
Status: DISCONTINUED | OUTPATIENT
Start: 2024-10-21 | End: 2024-10-22

## 2024-10-21 RX ORDER — CLONAZEPAM 0.5 MG/1
1 TABLET ORAL NIGHTLY
Status: DISCONTINUED | OUTPATIENT
Start: 2024-10-21 | End: 2024-10-21

## 2024-10-21 RX ORDER — ATORVASTATIN CALCIUM 20 MG/1
20 TABLET, FILM COATED ORAL NIGHTLY
Status: DISCONTINUED | OUTPATIENT
Start: 2024-10-21 | End: 2024-10-21

## 2024-10-21 RX ORDER — PHENYTOIN SODIUM 100 MG/1
200 CAPSULE, EXTENDED RELEASE ORAL 2 TIMES DAILY
Status: DISCONTINUED | OUTPATIENT
Start: 2024-10-21 | End: 2024-10-22

## 2024-10-21 RX ORDER — METOPROLOL TARTRATE 1 MG/ML
5 INJECTION, SOLUTION INTRAVENOUS EVERY 6 HOURS
Status: DISCONTINUED | OUTPATIENT
Start: 2024-10-21 | End: 2024-10-23

## 2024-10-21 RX ORDER — LACOSAMIDE 10 MG/ML
100 INJECTION, SOLUTION INTRAVENOUS EVERY 12 HOURS
Status: DISCONTINUED | OUTPATIENT
Start: 2024-10-21 | End: 2024-10-22

## 2024-10-21 RX ORDER — ASPIRIN 81 MG/1
81 TABLET ORAL DAILY
Status: DISCONTINUED | OUTPATIENT
Start: 2024-10-21 | End: 2024-10-21

## 2024-10-21 NOTE — ED INITIAL ASSESSMENT (HPI)
Patient received via [facility] for seizure while being bathed today. Patient incontinent of stool and had loose malodorous stools upon arrival. Patient a&O x 1, which per SNF staff patient is not baseline.

## 2024-10-21 NOTE — ED QUICK NOTES
This RN accomp patient to CT. Patient upright in bed, resting comfortable. Equal chest rise and fall. No distress.

## 2024-10-21 NOTE — ED PROVIDER NOTES
Patient Seen in: [facility] Emergency Department      History     Chief Complaint   Patient presents with    Seizure Disorder     Stated Complaint: Seizure    Subjective:   HPI  Patient is a 83 yo F with a history of seizures, HTN, HLD, hypothyroidism, CAD who presents to ED for evaluation via EMS from SNF after witnessed seizure episode just PTA. Per EMS, patient had a seizure for unknown period of time while being bathed today and arrived to ED incontinent of stool. Patient arrived to ED alert and oriented x1 initially but oriented x2 upon my evaluation. Patient has no complaints and does not know why she is in hospital. She denies any CP, SOB, abd pain, headache, dizziness, weakness. .     Additional history obtained from daughter. Patient has had diarrhea over past few days, which is not unusual for her but daughter suspects patient could have been dehydrated which has sometimes triggered seizures. She does not believe patient has been on any antibiotics recently. Patient does have mild dementia at baseline but is typically more conversational.  Daughter states patient typically takes seizure meds around 9:30 AM but seizure occurred at 9 AM so patient did not receive home dose today. She is on  klonopin nightly. She is also on lamictal and phenytoin.     Objective:     Past Medical History:    Anxiety state    Atherosclerosis of coronary artery    Coronary atherosclerosis    Dementia (HCC)    Dementia without behavioral disturbance (HCC)    Disorder of thyroid    Esophageal reflux    Essential hypertension    GERD without esophagitis    Hearing impairment    left side partial hearing loss    High blood pressure    High cholesterol    History of blood clots    Hyperlipidemia    Hypertensive chronic kidney disease    Hypothyroidism    Incontinence    Muscle weakness    Personal history of venous thrombosis and embolism    Pneumonia due to organism    Presence of coronary angioplasty implant and graft     Psoriasis    Renal disorder    Seizure disorder (HCC)    Visual impairment    Vitamin D deficiency              Past Surgical History:   Procedure Laterality Date    Cath bare metal stent (bms)  05/03/2011    xience V    Colonoscopy      Hysterectomy      Total knee replacement      Left knee                Social History     Socioeconomic History    Marital status:    Tobacco Use    Smoking status: Never    Smokeless tobacco: Never   Substance and Sexual Activity    Alcohol use: Not Currently    Drug use: Never                  Physical Exam     ED Triage Vitals   BP 10/21/24 1045 155/83   Pulse 10/21/24 1037 101   Resp 10/21/24 1037 14   Temp 10/21/24 1037 98.8 °F (37.1 °C)   Temp src 10/21/24 1037 Temporal   SpO2 10/21/24 1037 98 %   O2 Device 10/21/24 1037 None (Room air)       Current Vitals:   Vital Signs  BP: 152/79  Pulse: 98  Resp: 19  Temp: 98.8 °F (37.1 °C)  Temp src: Temporal  MAP (mmHg): 100    Oxygen Therapy  SpO2: 99 %  O2 Device: Nasal cannula  O2 Flow Rate (L/min): 3 L/min        Physical Exam  Vitals and nursing note reviewed.   Constitutional:       General: She is not in acute distress.     Appearance: She is not ill-appearing.   HENT:      Head: Normocephalic and atraumatic.      Mouth/Throat:      Mouth: Mucous membranes are moist.   Eyes:      Extraocular Movements: Extraocular movements intact.      Pupils: Pupils are equal, round, and reactive to light.   Cardiovascular:      Rate and Rhythm: Normal rate and regular rhythm.   Pulmonary:      Effort: Pulmonary effort is normal.   Abdominal:      General: There is no distension.      Palpations: Abdomen is soft.      Tenderness: There is no abdominal tenderness.   Musculoskeletal:      Cervical back: Neck supple.      Right lower leg: No edema.      Left lower leg: No edema.   Skin:     General: Skin is warm and dry.      Capillary Refill: Capillary refill takes less than 2 seconds.   Neurological:      General: No focal deficit  present.      Mental Status: She is alert.      Comments: Oriented x2  5/5 strength in UE and LE bilaterally  Normal sensation  CN II-XII in tact  No pronator drift  Follows commands       Psychiatric:         Mood and Affect: Mood normal.             ED Course     Labs Reviewed   CBC WITH DIFFERENTIAL WITH PLATELET - Abnormal; Notable for the following components:       Result Value    WBC 14.5 (*)     Neutrophil Absolute Prelim 12.52 (*)     Neutrophil Absolute 12.52 (*)     Lymphocyte Absolute 0.71 (*)     Monocyte Absolute 1.11 (*)     All other components within normal limits   COMP METABOLIC PANEL (14) - Abnormal; Notable for the following components:    Glucose 147 (*)     Sodium 126 (*)     Chloride 94 (*)     BUN 6 (*)     Calculated Osmolality 262 (*)     All other components within normal limits   URINALYSIS, ROUTINE - Abnormal; Notable for the following components:    Clarity Urine Turbid (*)     Blood Urine Trace (*)     Protein Urine 20 (*)     Leukocyte Esterase Urine 500 (*)     WBC Urine >50 (*)     RBC Urine 6-10 (*)     Squamous Epi. Cells Few (*)     Hyaline Casts Present (*)     WBC Clump Present (*)     All other components within normal limits   SODIUM, SERUM - Abnormal; Notable for the following components:    Sodium 128 (*)     All other components within normal limits   TSH W REFLEX TO FREE T4 - Abnormal; Notable for the following components:    TSH 0.318 (*)     All other components within normal limits   POCT GLUCOSE - Abnormal; Notable for the following components:    POC Glucose 150 (*)     All other components within normal limits   MAGNESIUM - Normal   URIC ACID - Normal   T4, FREE (S) - Normal   FREE T3 (TRIIODOTHYRONINE) - Normal   C. DIFFICILE(TOXIGENIC)PCR - Normal   ED/MRSA SCREEN BY PCR-CC - Normal   LAMOTRIGINE (LAMICTAL), SERUM   PHENYTOIN (DILANTIN) TOTAL   RAINBOW DRAW BLUE   STOOL CULTURE W/SHIGATOXIN    Narrative:     The following orders were created for panel order Stool  Culture W/Shigatoxin.  Procedure                               Abnormality         Status                     ---------                               -----------         ------                     Stool Culture[[id number]]                                    In process                 Shigatoxin[[id number]]                                       In process                   Please view results for these tests on the individual orders.   STOOL CULTURE(P)   SHIGATOXIN   URINE CULTURE, ROUTINE       ED Course as of 10/21/24 1728  ------------------------------------------------------------  Time: 10/21 1213  Comment: Patient had witnessed seizure in the ED which resolved after 6 of Ativan and IV Vimpat 100 mg.  Discussed with neurology who recommended loading dose of additional 300 mg of Vimpat.  Patient also given 200 mg of phosphonate to 1 per neuro.  Discussed with nephrology Dr. Peterson on-call who recommended giving 100 cc of hypertonic saline.  Patient currently withdrawing to pain, eyes closed.  Seizure-like activity has stopped.  Patient is postictal.              MDM      Patient is a 85 yo F with a history of seizures, HTN, HLD, hypothyroidism, CAD who presents to ED for evaluation via EMS from [facility] after witnessed seizure episode just PTA. Patient arrives to ED afebrile, HDS, satting well on RA. On exam patient is alert and oriented x2 with no FND. Following commands. Abd benign. Differential includes but not limited to seizure, arrhythmia, electrolyte abnormality, dehydration, infection, ICH. Plan for labs, UA, CTH, CXR, EKG.     ED Course:   - CBC shows WBC of 14.5, possibly reactive. CMP significant for sodium of 126, new from labs 7 months ago, otherwise unremarkable. Mg wnl. UA shows no bacteria, urine cultures sent. Stool cultures sent, c diff neg.  Suspect tachypnea and HR>90s likely 2/2 seizure episode, low suspicion for sepsis at this time. Pt is afebrile with no seizures at home.   - While awaiting lab  results, patient had another witnessed seizure episode in the ED which did not stop with ativan. Patient given IV vimpat home dose. Discussed with general neuro on call and ordered additional IV vimpat for loading dose in addition to fosphenytoin per neuro recs. Patient also found to be hyponatremic so nephrology was consulted and 100 mL of hypertonic saline was ordered. Nephro evaluated patient in ED. CTH independently reviewed and shows no ICH. CXR unremarkable. Patient remained drowsy and post-ictal after IV ativan and seizure medications administered. Currently protecting airway, will localize to pain. Admitted patient to CNICU. Discussed with Dr. Burgess and Dr. Garcia. Daughter updated on plan of results and plan of care.       Critical Care Time:  A total of 50 minutes of critical care time (exclusive of billable procedures) was administered to manage the patient's critical lab values and neurologic instability due to her seizures, hyponatremia.  This involved direct patient intervention, complex decision making, and/or extensive discussions with the patient, family, and clinical staff.        Medical Decision Making      Disposition and Plan     Clinical Impression:  1. Recurrent seizures (HCC)    2. Hyponatremia    3. Diarrhea, unspecified type         Disposition:  There is no disposition on file for this visit.  There is no disposition time on file for this visit.    Follow-up:  No follow-up provider specified.        Medications Prescribed:  Current Discharge Medication List              Supplementary Documentation:

## 2024-10-21 NOTE — CONSULTS
[facility]  Neurocritical Care Consult Note    Alysia Norman Patient Status:  Inpatient    1940 MRN [id number]   Location [facility] 6NE-A Attending Alondra Burgess MD   Hosp Day # 0 PCP Alondra Burgess MD       Reason for Consultation:   seizures    HPI:   Patient is a 84 year old female with a h/o htn, hl, cad, ckd, hypothyroidism, dementia and seizures p/w recurrent seizures 10/21. Pt noted to have generalized shaking c/f seizure-like activity at [facility] thus brought to ED where she had recurrent generalized shaking episode, thus given ativan, pht, and vimpat loads, and lab w/u revealed Na 126 and probable UTI, and pt was transferred to [facility] for further monitoring.     Past Medical History:    Anxiety state    Atherosclerosis of coronary artery    Coronary atherosclerosis    Dementia (HCC)    Dementia without behavioral disturbance (HCC)    Disorder of thyroid    Esophageal reflux    Essential hypertension    GERD without esophagitis    Hearing impairment    left side partial hearing loss    High blood pressure    High cholesterol    History of blood clots    Hyperlipidemia    Hypertensive chronic kidney disease    Hypothyroidism    Incontinence    Muscle weakness    Personal history of venous thrombosis and embolism    Pneumonia due to organism    Presence of coronary angioplasty implant and graft    Psoriasis    Renal disorder    Seizure disorder (HCC)    Visual impairment    Vitamin D deficiency       Past Surgical History:   Procedure Laterality Date    Cath bare metal stent (bms)  2011    xience V    Colonoscopy      Hysterectomy      Total knee replacement      Left knee       Prescriptions Prior to Admission[1]  Allergies[2]  Social History     Socioeconomic History    Marital status:    Tobacco Use    Smoking status: Never    Smokeless tobacco: Never   Substance and Sexual Activity    Alcohol use: Not Currently    Drug use: Never     No family history on file.     Current Meds:  Current Facility-Administered Medications   Medication Dose Route Frequency    sodium chloride 0.9% infusion   Intravenous Continuous       Review of Systems:     Constitutional:    Denies unusual weight loss or weight gain, fever/chills or night sweats.  HEENT:                Denies changes in vision or difficulty swallowing.  Pulm:                    Denies dyspnea, cough, or sputum production  Cardiac:               Denies chest pain, palpitations or lower extremity edema.  GI:                         Denies constipation, heartburn or melena.  :                       Denies dysuria or hematuria.  Skin:                     Denies rashes or open areas.  Neuro:                  As per HPI    All other systems were reviewed and are negative.    Vitals:   Temp:  [98.8 °F (37.1 °C)] 98.8 °F (37.1 °C)  Pulse:  [] 98  Resp:  [14-27] 19  BP: (107-155)/(61-97) 152/79  SpO2:  [98 %-100 %] 99 %  There is no height or weight on file to calculate BMI.    Intake/Output:  No intake or output data in the 24 hours ending 10/21/24 1413    Physical Examination:   General- No acute distress  CV- RRR  Resp- CTA Bilat  Neuro-  Mental status- lethargic but arousable to voice, regards and occ follows, speech slow and nonsensical  Cranial nerves- pupils equally round and reactive to light, extraocular muscles intact, face symmetric  Motor- singh x4  Sens-  Intact to light touch    Diagnostics:   CT BRAIN OR HEAD (CPT=70450)    Result Date: 10/21/2024  CONCLUSION:   1. Negative for acute intracranial process.    LOCATION:  [id number]   Dictated by (CST): Enrico Ramirez MD on 10/21/2024 at 1:31 PM     Finalized by (CST): Enrico Ramirez MD on 10/21/2024 at 1:33 PM       XR CHEST AP PORTABLE  (CPT=71045)    Result Date: 10/21/2024  CONCLUSION:  Overall stable appearance of the chest.   LOCATION:  [facility]      Dictated by (CST): Edi Mayo MD on 10/21/2024 at 11:54 AM     Finalized by (CST): Edi Mayo MD on 10/21/2024 at 11:57 AM         Lab Review     Recent Labs   Lab 10/21/24  1102   *   K 4.2   CL 94*   CO2 25.0   *   BUN 6*   CREATSERUM 0.75     Recent Labs   Lab 10/21/24  1102   WBC 14.5*   HGB 13.7   .0       Assesment/Plan:     Neuro:  Recurrent seizure- likely 2/2 underlying seizure do with superimposed UTI and hypoNa.   Ct head no acute changes, will cont home dilantin and vimpat, check vEEG for further eval.   Cont neurochecks/pt/ot/st.  H/o dementia- cont home meds  Cardiac:  Htn/hl/cad- sbp goal 100-150, cont home meds  Pulmonary:  Stable on RA  Renal:  CKD/HypoNa- management per renal  GI:  PO as tolerated  Heme/ID:  +UA- empiric abx per IM  Endocrine/Rheum:  Monitor glucose, sliding scale insulin prn  DVT Prophylaxis:  SCD’s    Goals of the Day: neurochecks, vEEG   A total of 80 minutes of critical care time (exclusive of billable procedures) was administered to manage and/or prevent neurologic instability. This involved direct patient intervention, complex decision making, and/or extensive discussions with the patient, family, and clinical staff.    Thank you for allowing me to participate in the care of this patient.     Earl Garcia MD, [facility]  Medical Director of System Neurosciences  Chief, Section of Neurocritical Care  [facility] [place]           [1]   Medications Prior to Admission   Medication Sig Dispense Refill Last Dose/Taking    LEVOTHYROXINE 175 MCG Oral Tab TAKE ONE TABLET BY MOUTH EVERY MORNING 14 tablet 10     FLUTICASONE PROPIONATE 50 MCG/ACT Nasal Suspension INSTILL ONE SPRAY INTO EACH NOSTRIL EVERY 12 HOURS 16 g 11     FAMOTIDINE 20 MG Oral Tab TAKE ONE TABLET BY MOUTH EVERY DAY, STOP PROTONIX 14 tablet 10     ASPIRIN LOW DOSE 81 MG Oral Tab EC TAKE ONE TABLET BY MOUTH EVERY DAY 14 tablet 10     METOPROLOL TARTRATE 25 MG Oral Tab TAKE ONE TABLET BY MOUTH 2 TIMES A DAY 28 tablet 10     ATORVASTATIN 20 MG Oral Tab TAKE ONE TABLET BY MOUTH NIGHTLY 14  tablet 10     CALCIUM CARB-CHOLECALCIFEROL 600-10 MG-MCG Oral Tab TAKE ONE TABLET BY MOUTH EVERY DAY 14 tablet 10     triamcinolone 0.1 % External Cream APPLY 1 APPLICATION TOPICALLY TO AFFECTED AREA 2 TIMES A DAY AS NEEDED FOR PSORIASIS 80 g 2     GNP ARTIFICIAL TEARS 5-6 MG/ML Ophthalmic Solution        ERGOCALCIFEROL 1.25 MG (07895 UT) Oral Cap TAKE ONE CAPSULE BY MOUTH EVERY 30 DAYS 1 capsule 10     clonazePAM 1 MG Oral Tab TAKE ONE TABLET BY MOUTH AT BEDTIME 14 tablet 5     memantine 5 MG Oral Tab TAKE ONE TABLET BY MOUTH EVERY DAY 14 tablet 11     GAVILAX 17 GM/SCOOP Oral Powder MIX 17 GRAMS IN 8 OUNCE FLUID AND DRINK DAILY AS NEEDED FOR CONSTIPATION 510 g 10     phenytoin  MG Oral Cap TAKE 2 CAPSULES BY MOUTH MON/FRI MORNINGS AND 1 CAPSULE OTHER 5 DAYS A WEEK IN THE MORNING PLUS 200MG AT BEDTIME TAKE 1 CAP TUES/WED/THURS/SAT/SUN TAKE 2 CAPSULES AT BEDTIME 46 capsule 0     GEMTESA 75 MG Oral Tab Take 75 mg by mouth daily.       CYANOCOBALAMIN 1000 MCG/ML Injection Solution INJECT 1ML INTRAMUSCULARLY MONTHLY FOR 3 MONTHS 1 mL 0     Fluocinolone Acetonide Scalp 0.01 % External Oil Apply to scalp nightly, leave in overnight then wash out each morning.       Nystatin 234514 UNIT/GM External Powder Apply 1 Application topically 2 (two) times daily as needed (fungal rash).       lacosamide (VIMPAT) 100 MG Oral Tab Take 1 tablet (100 mg total) by mouth 2 (two) times daily. At 0900 am and 2000 60 tablet 0     fexofenadine (ALLEGRA ALLERGY) 180 MG Oral Tab Take 1 tablet (180 mg total) by mouth daily. 30 tablet 0     lactulose 10 GM/15ML Oral Solution Take 15 mL (10 g total) by mouth daily as needed.       Omega-3 Fatty Acids (FISH OIL) 1200 MG Oral Cap Take 1,200 mg by mouth daily.       Phenytoin Sodium Extended 200 MG Oral Cap Take 1 capsule (200 mg total) by mouth nightly. At bedtime       MAPAP 500 MG Oral Tab TAKE ONE TABLET BY MOUTH EVERY 6 HOUR AS NEEDED FOR PAIN 20 tablet 5     Calcium Carbonate Antacid  (TUMS) 500 MG Oral Chew Tab Chew 1 tablet (500 mg total) by mouth 2 (two) times daily as needed for Heartburn. 28 tablet 1    [2]   Allergies  Allergen Reactions    Carbatrol [Equetro] UNKNOWN    Diovan [Valsartan] UNKNOWN    Hydrochlorothiazide UNKNOWN    Latex UNKNOWN    Oxcarbazepine UNKNOWN    Sertraline UNKNOWN    Topamax [Topiramate] UNKNOWN    Zonisamide UNKNOWN

## 2024-10-21 NOTE — CONSULTS
[facility]  Report of Consultation    Alysia Norman Patient Status:  Emergency    1940 MRN [id number]   Location [facility] EMERGENCY DEPARTMENT Attending Kala Bertrand, DO   Hosp Day # 0 PCP Alondra Burgess MD       Assessment / Plan:    1) Hyponatremia- in setting of diarrhea / probable UTI / poor solute intake; meds appear benign and unchanged recently. Likely dry on admit; doubt SIADH, endocrinopathy, etc. CXR / CT brain unimpressive. PLAN- 3% saline bolus given seizure followed by NS and serial labs.    2) Witnessed seizure- in part due to hyponatremia in pt with pre-existing sz disorder- as per neuro    3) Dementia in LTC    4) HTN- on metoprolol    5) Hypothyroidism- check TSH with above    6) Probable UTI- empiric abx / await cx    D/W daughter at bedside.      Reason for Consultation:  Hyponatremia    History of Present Illness:  Alysia Norman is a a(n) 84 year old female. Dictation to follow    History:  Past Medical History:    Anxiety state    Atherosclerosis of coronary artery    Coronary atherosclerosis    Dementia (HCC)    Dementia without behavioral disturbance (HCC)    Disorder of thyroid    Esophageal reflux    Essential hypertension    GERD without esophagitis    Hearing impairment    left side partial hearing loss    High blood pressure    High cholesterol    History of blood clots    Hyperlipidemia    Hypertensive chronic kidney disease    Hypothyroidism    Incontinence    Muscle weakness    Personal history of venous thrombosis and embolism    Pneumonia due to organism    Presence of coronary angioplasty implant and graft    Psoriasis    Renal disorder    Seizure disorder (HCC)    Visual impairment    Vitamin D deficiency     Past Surgical History:   Procedure Laterality Date    Cath bare metal stent (bms)  2011    xience V    Colonoscopy      Hysterectomy      Total knee replacement      Left knee     No family history on file.  Denies family history of kidney  disease.    reports that she has never smoked. She has never used smokeless tobacco. She reports that she does not currently use alcohol. She reports that she does not use drugs.    Allergies:  Allergies[1]    Medications:  No current facility-administered medications for this encounter.  Prior to Admission Medications   Medication Sig    LEVOTHYROXINE 175 MCG Oral Tab TAKE ONE TABLET BY MOUTH EVERY MORNING    FLUTICASONE PROPIONATE 50 MCG/ACT Nasal Suspension INSTILL ONE SPRAY INTO EACH NOSTRIL EVERY 12 HOURS    FAMOTIDINE 20 MG Oral Tab TAKE ONE TABLET BY MOUTH EVERY DAY, STOP PROTONIX    ASPIRIN LOW DOSE 81 MG Oral Tab EC TAKE ONE TABLET BY MOUTH EVERY DAY    METOPROLOL TARTRATE 25 MG Oral Tab TAKE ONE TABLET BY MOUTH 2 TIMES A DAY    ATORVASTATIN 20 MG Oral Tab TAKE ONE TABLET BY MOUTH NIGHTLY    CALCIUM CARB-CHOLECALCIFEROL 600-10 MG-MCG Oral Tab TAKE ONE TABLET BY MOUTH EVERY DAY    triamcinolone 0.1 % External Cream APPLY 1 APPLICATION TOPICALLY TO AFFECTED AREA 2 TIMES A DAY AS NEEDED FOR PSORIASIS    GNP ARTIFICIAL TEARS 5-6 MG/ML Ophthalmic Solution     ERGOCALCIFEROL 1.25 MG (86387 UT) Oral Cap TAKE ONE CAPSULE BY MOUTH EVERY 30 DAYS    clonazePAM 1 MG Oral Tab TAKE ONE TABLET BY MOUTH AT BEDTIME    memantine 5 MG Oral Tab TAKE ONE TABLET BY MOUTH EVERY DAY    GAVILAX 17 GM/SCOOP Oral Powder MIX 17 GRAMS IN 8 OUNCE FLUID AND DRINK DAILY AS NEEDED FOR CONSTIPATION    phenytoin  MG Oral Cap TAKE 2 CAPSULES BY MOUTH MON/FRI MORNINGS AND 1 CAPSULE OTHER 5 DAYS A WEEK IN THE MORNING PLUS 200MG AT BEDTIME TAKE 1 CAP TUES/WED/THURS/SAT/SUN TAKE 2 CAPSULES AT BEDTIME    GEMTESA 75 MG Oral Tab Take 75 mg by mouth daily.    CYANOCOBALAMIN 1000 MCG/ML Injection Solution INJECT 1ML INTRAMUSCULARLY MONTHLY FOR 3 MONTHS    Fluocinolone Acetonide Scalp 0.01 % External Oil Apply to scalp nightly, leave in overnight then wash out each morning.    Nystatin 119487 UNIT/GM External Powder Apply 1 Application topically  2 (two) times daily as needed (fungal rash).    lacosamide (VIMPAT) 100 MG Oral Tab Take 1 tablet (100 mg total) by mouth 2 (two) times daily. At 0900 am and     fexofenadine (ALLEGRA ALLERGY) 180 MG Oral Tab Take 1 tablet (180 mg total) by mouth daily.    lactulose 10 GM/15ML Oral Solution Take 15 mL (10 g total) by mouth daily as needed.    Omega-3 Fatty Acids (FISH OIL) 1200 MG Oral Cap Take 1,200 mg by mouth daily.    Phenytoin Sodium Extended 200 MG Oral Cap Take 1 capsule (200 mg total) by mouth nightly. At bedtime    MAPAP 500 MG Oral Tab TAKE ONE TABLET BY MOUTH EVERY 6 HOUR AS NEEDED FOR PAIN    Calcium Carbonate Antacid (TUMS) 500 MG Oral Chew Tab Chew 1 tablet (500 mg total) by mouth 2 (two) times daily as needed for Heartburn.       Review of Systems:  Please see HPI for pertinent positives. 10 point review of systems otherwise reviewed and negative.     Physical Exam:  /79   Pulse 98   Temp 98.8 °F (37.1 °C) (Temporal)   Resp 19   SpO2 99%   Temp (24hrs), Av.8 °F (37.1 °C), Min:98.8 °F (37.1 °C), Max:98.8 °F (37.1 °C)     No intake or output data in the 24 hours ending 10/21/24 1324  Wt Readings from Last 3 Encounters:   24 170 lb (77.1 kg)   11/15/23 175 lb (79.4 kg)   23 170 lb (77.1 kg)     General: non-responsive  HEENT: No scleral icterus, MMM  Neck: Supple, no JULIO or thyromegaly  Cardiac: Regular rate and rhythm, S1, S2 normal, no murmur, rub, or gallop  Lungs: Decreased breath sounds at the bases bilaterally.   Abdomen: Soft, non-tender. + bowel sounds, no palpable organomegaly  Extremities: Without clubbing, cyanosis; no edema  Neurologic: Cranial nerves grossly intact, moving all extremities  Skin: Warm and dry, no rashes      Laboratory Data:  Lab Results   Component Value Date    WBC 14.5 10/21/2024    HGB 13.7 10/21/2024    HCT 37.8 10/21/2024    .0 10/21/2024    CREATSERUM 0.75 10/21/2024    BUN 6 10/21/2024     10/21/2024    K 4.2 10/21/2024    CL  94 10/21/2024    CO2 25.0 10/21/2024     10/21/2024    CA 9.9 10/21/2024    ALB 4.4 10/21/2024    ALKPHO 95 10/21/2024    BILT 0.5 10/21/2024    TP 7.2 10/21/2024    AST 27 10/21/2024    ALT 18 10/21/2024    MG 1.6 10/21/2024    PGLU 150 10/21/2024       Imaging:  All imaging studies reviewed.      Thank you for allowing me to participate in the care of your patient.    Cassandra Peterson MD  10/21/2024  1:24 PM         [1]   Allergies  Allergen Reactions    Carbatrol [Equetro] UNKNOWN    Diovan [Valsartan] UNKNOWN    Hydrochlorothiazide UNKNOWN    Latex UNKNOWN    Oxcarbazepine UNKNOWN    Sertraline UNKNOWN    Topamax [Topiramate] UNKNOWN    Zonisamide UNKNOWN

## 2024-10-22 ENCOUNTER — NURSE ONLY (OUTPATIENT)
Dept: ELECTROPHYSIOLOGY | Facility: HOSPITAL | Age: 84
End: 2024-10-22
Attending: Other
Payer: MEDICARE

## 2024-10-22 PROBLEM — G40.909 RECURRENT SEIZURES (HCC): Status: ACTIVE | Noted: 2018-06-19

## 2024-10-22 PROBLEM — E22.2 SIADH (SYNDROME OF INAPPROPRIATE ADH PRODUCTION) (HCC): Status: ACTIVE | Noted: 2024-10-22

## 2024-10-22 LAB
ANION GAP SERPL CALC-SCNC: 5 MMOL/L (ref 0–18)
BASOPHILS # BLD AUTO: 0.02 X10(3) UL (ref 0–0.2)
BASOPHILS NFR BLD AUTO: 0.3 %
BUN BLD-MCNC: <5 MG/DL (ref 9–23)
CALCIUM BLD-MCNC: 9.2 MG/DL (ref 8.7–10.4)
CHLORIDE SERPL-SCNC: 107 MMOL/L (ref 98–112)
CO2 SERPL-SCNC: 27 MMOL/L (ref 21–32)
CREAT BLD-MCNC: 0.66 MG/DL
EGFRCR SERPLBLD CKD-EPI 2021: 88 ML/MIN/1.73M2 (ref 60–?)
EOSINOPHIL # BLD AUTO: 0.27 X10(3) UL (ref 0–0.7)
EOSINOPHIL NFR BLD AUTO: 3.6 %
ERYTHROCYTE [DISTWIDTH] IN BLOOD BY AUTOMATED COUNT: 12.3 %
GLUCOSE BLD-MCNC: 100 MG/DL (ref 70–99)
GLUCOSE BLD-MCNC: 106 MG/DL (ref 70–99)
GLUCOSE BLD-MCNC: 92 MG/DL (ref 70–99)
GLUCOSE BLD-MCNC: 94 MG/DL (ref 70–99)
HCT VFR BLD AUTO: 35.9 %
HGB BLD-MCNC: 12.5 G/DL
IMM GRANULOCYTES # BLD AUTO: 0.02 X10(3) UL (ref 0–1)
IMM GRANULOCYTES NFR BLD: 0.3 %
LYMPHOCYTES # BLD AUTO: 1.18 X10(3) UL (ref 1–4)
LYMPHOCYTES NFR BLD AUTO: 15.7 %
MAGNESIUM SERPL-MCNC: 2.2 MG/DL (ref 1.6–2.6)
MAGNESIUM SERPL-MCNC: 2.2 MG/DL (ref 1.6–2.6)
MCH RBC QN AUTO: 33.2 PG (ref 26–34)
MCHC RBC AUTO-ENTMCNC: 34.8 G/DL (ref 31–37)
MCV RBC AUTO: 95.5 FL
MONOCYTES # BLD AUTO: 0.77 X10(3) UL (ref 0.1–1)
MONOCYTES NFR BLD AUTO: 10.3 %
NEUTROPHILS # BLD AUTO: 5.25 X10 (3) UL (ref 1.5–7.7)
NEUTROPHILS # BLD AUTO: 5.25 X10(3) UL (ref 1.5–7.7)
NEUTROPHILS NFR BLD AUTO: 69.8 %
PHENYTOIN SERPL-MCNC: 20.7 UG/ML (ref 10–20)
PHOSPHATE SERPL-MCNC: 3.5 MG/DL (ref 2.4–5.1)
PLATELET # BLD AUTO: 157 10(3)UL (ref 150–450)
POTASSIUM SERPL-SCNC: 3.7 MMOL/L (ref 3.5–5.1)
RBC # BLD AUTO: 3.76 X10(6)UL
SODIUM SERPL-SCNC: 134 MMOL/L (ref 136–145)
SODIUM SERPL-SCNC: 139 MMOL/L (ref 136–145)
WBC # BLD AUTO: 7.5 X10(3) UL (ref 4–11)

## 2024-10-22 PROCEDURE — 99291 CRITICAL CARE FIRST HOUR: CPT | Performed by: INTERNAL MEDICINE

## 2024-10-22 PROCEDURE — 99233 SBSQ HOSP IP/OBS HIGH 50: CPT | Performed by: INTERNAL MEDICINE

## 2024-10-22 RX ORDER — LEVOTHYROXINE SODIUM 150 UG/1
150 TABLET ORAL
Status: DISCONTINUED | OUTPATIENT
Start: 2024-10-23 | End: 2024-10-23

## 2024-10-22 RX ORDER — HYDROCORTISONE 25 MG/G
1 OINTMENT TOPICAL 2 TIMES DAILY PRN
COMMUNITY

## 2024-10-22 RX ORDER — HYDROCORTISONE 25 MG/G
1 CREAM TOPICAL
COMMUNITY

## 2024-10-22 RX ORDER — PHENYTOIN SODIUM 100 MG/1
100 CAPSULE, EXTENDED RELEASE ORAL
Status: DISCONTINUED | OUTPATIENT
Start: 2024-10-25 | End: 2024-10-23

## 2024-10-22 RX ORDER — FLUTICASONE PROPIONATE 50 MCG
1 SPRAY, SUSPENSION (ML) NASAL EVERY 12 HOURS
COMMUNITY

## 2024-10-22 RX ORDER — ATORVASTATIN CALCIUM 20 MG/1
20 TABLET, FILM COATED ORAL NIGHTLY
Status: DISCONTINUED | OUTPATIENT
Start: 2024-10-22 | End: 2024-10-23

## 2024-10-22 RX ORDER — PANTOPRAZOLE SODIUM 40 MG/1
40 TABLET, DELAYED RELEASE ORAL
Status: DISCONTINUED | OUTPATIENT
Start: 2024-10-23 | End: 2024-10-23

## 2024-10-22 RX ORDER — PHENYTOIN SODIUM 100 MG/1
200 CAPSULE, EXTENDED RELEASE ORAL NIGHTLY
Status: DISCONTINUED | OUTPATIENT
Start: 2024-10-22 | End: 2024-10-23

## 2024-10-22 RX ORDER — MEMANTINE HYDROCHLORIDE 5 MG/1
5 TABLET ORAL NIGHTLY
Status: DISCONTINUED | OUTPATIENT
Start: 2024-10-22 | End: 2024-10-23

## 2024-10-22 RX ORDER — PHENYTOIN SODIUM 100 MG/1
200 CAPSULE, EXTENDED RELEASE ORAL
Status: DISCONTINUED | OUTPATIENT
Start: 2024-10-23 | End: 2024-10-23

## 2024-10-22 NOTE — H&P
[facility]  History & Physical    Alysia Norman Patient Status:  Inpatient    1940 MRN [id number]   Location [facility] 6NE-A Attending Alondra Burgess MD   Hosp Day # 1 PCP Alondra Burgess MD     History of Present Illness:  Alysia Norman is a(n) 84 year old female sent to emergency room from assisted living facility because of seizure episode.  Seizure waswitnessed by the staff at the facility.  She has history of complex partial seizures and on multiple seizure medications since .  She had diarrhea last 2 days, no episode of breast upper respiratory symptoms or recent use of antibiotics  She had another episode of seizure while in the emergency room.  Her emergency room workup showed hyponatremia, admitted to the neuro ICU.  She was postictal yesterday, more alert and awake this morning.  Denies any more diarrhea    Her medical history also including dementia with behavioral problems, CAD ,anxiety hypothyroidism, hypertension, and hyperlipidemia  She was admitted last year with similar episode  History:  Past Medical History:    Anxiety state    Atherosclerosis of coronary artery    Coronary atherosclerosis    Dementia (HCC)    Dementia without behavioral disturbance (HCC)    Disorder of thyroid    Esophageal reflux    Essential hypertension    GERD without esophagitis    Hearing impairment    left side partial hearing loss    High blood pressure    High cholesterol    History of blood clots    Hyperlipidemia    Hypertensive chronic kidney disease    Hypothyroidism    Incontinence    Muscle weakness    Personal history of venous thrombosis and embolism    Pneumonia due to organism    Presence of coronary angioplasty implant and graft    Psoriasis    Renal disorder    Seizure disorder (HCC)    Visual impairment    Vitamin D deficiency     Past Surgical History:   Procedure Laterality Date    Cath bare metal stent (bms)  2011    xience V    Colonoscopy      Hysterectomy      Total  knee replacement      Left knee     No family history on file.   reports that she has never smoked. She has never used smokeless tobacco. She reports that she does not currently use alcohol. She reports that she does not use drugs.    Allergies:  Allergies[1]    Home Medications:  Prescriptions Prior to Admission[2]    Review of Systems:  A comprehensive 10 point review of systems was completed.  Pertinent positives and negatives noted in the the HPI.    Physical Exam:  Vital signs: Blood pressure 131/83, pulse 81, temperature 98.1 °F (36.7 °C), temperature source Temporal, resp. rate 23, weight 167 lb 12.3 oz (76.1 kg), SpO2 95%.  General: No acute distress. Alert and oriented x 2.  HEENT: Moist mucous membranes. EOM-I. PERRL  Neck: No lymphadenopathy.  No JVD. No carotid bruits.  Respiratory: Clear to auscultation bilaterally.  No wheezes. No rhonchi.  Cardiovascular: S1, S2.  Regular rate and rhythm.  No murmurs. Equal pulses   Abdomen: Soft, nontender, nondistended.  Positive bowel sounds. No rebound tenderness  Neurologic: No focal neurological deficits.  Musculoskeletal: Full range of motion of all extremities.  No swelling noted.  Integument: No lesions. No erythema.  Psychiatric: Appropriate mood and affect.    Laboratory Data:   Lab Results   Component Value Date    WBC 14.5 10/21/2024    HGB 13.7 10/21/2024    HCT 37.8 10/21/2024    .0 10/21/2024    CREATSERUM 0.75 10/21/2024    BUN 6 10/21/2024     10/21/2024    K 4.2 10/21/2024    CL 94 10/21/2024    CO2 25.0 10/21/2024     10/21/2024    CA 9.9 10/21/2024    ALB 4.4 10/21/2024    ALKPHO 95 10/21/2024    BILT 0.5 10/21/2024    TP 7.2 10/21/2024    AST 27 10/21/2024    ALT 18 10/21/2024    T4F 1.7 10/21/2024    TSH 0.318 10/21/2024    MG 1.6 10/21/2024    PGLU 92 10/22/2024       Imaging:  CT BRAIN OR HEAD (CPT=70450)    Result Date: 10/21/2024  PROCEDURE:  CT BRAIN OR HEAD (67298)  COMPARISON:  [facility] , CT, CT BRAIN OR HEAD (12309),  2/19/2024, 2:02 PM.  INDICATIONS:  Seizure  TECHNIQUE:  Noncontrast CT scanning is performed through the brain. Dose reduction techniques were used. Dose information is transmitted to the ACR (American College of Radiology) NRDR (National Radiology Data Registry) which includes the Dose Index Registry.  PATIENT STATED HISTORY: (As transcribed by Technologist)  Patient is here for seizure.    FINDINGS:   VENTRICLES/SULCI: Diffuse sulcal and ventricular prominence concordant with age.  No hydrocephalus. INTRACRANIAL:  Negative for intracranial hemorrhage, mass effect, or acute large vessel transcortical infarct.  Gray-white differentiation is preserved. SINUSES:           Paranasal sinuses and mastoid air cells are clear. SKULL:               No evidence for fracture or osseous abnormality. OTHER:               No scalp hematoma.            CONCLUSION:   1. Negative for acute intracranial process.    LOCATION:  [id number]   Dictated by (CST): Enrico Ramirez MD on 10/21/2024 at 1:31 PM     Finalized by (CST): Enrico Ramirez MD on 10/21/2024 at 1:33 PM       XR CHEST AP PORTABLE  (CPT=71045)    Result Date: 10/21/2024  PROCEDURE:  XR CHEST AP PORTABLE  (CPT=71045)  TECHNIQUE:  AP chest radiograph was obtained.  COMPARISON:  [facility] , XR, XR CHEST AP PORTABLE  (CPT=71045), 5/01/2023, 10:52 PM.  INDICATIONS:  Seizure  PATIENT STATED HISTORY: (As transcribed by Technologist)  The patient offered no additional history at this point.    FINDINGS:  Heart size is within normal limits.  Pleural spaces appear clear.  Mediastinal and hilar contours are normal.  No focal consolidation.            CONCLUSION:  Overall stable appearance of the chest.   LOCATION:  [facility]      Dictated by (CST): Edi Mayo MD on 10/21/2024 at 11:54 AM     Finalized by (CST): Edi Mayo MD on 10/21/2024 at 11:57 AM          Assessment & Plan:  1.  Seizure episode  History of partial complex seizures  2.Hyponatremia and dehydration   3.   Hypertension-well-controlled  4.  History of CAD-asymptomatic  5.  Hypothyroidism  6. ?  UTI  Diarrhea resolved C. difficile negative  Neurology and nephrology consult appreciated  On Dilantin and lacosamide and hypertonic saline  Monitor electrolytes  Was n.p.o. due to postictal state  Speech eval this morning and start diet since her mental status back to normal  Started on Rocephin for empiric UTI await urine culture  Lovenox for DVT prophylaxis  Patient Active Problem List   Diagnosis    Seizure (HCC)    Seizure disorder (HCC)    Hypothyroidism    Mixed hyperlipidemia    Essential hypertension    Convulsions (HCC)    Convulsions, unspecified convulsion type (HCC)    Dementia without behavioral disturbance (HCC)    Unsteady gait    Coronary artery disease involving native coronary artery of native heart without angina pectoris    Obesity due to excess calories    Scalp psoriasis    Bilateral leg edema    Vitamin D deficiency    Osteoarthritis of both knees    Psoriasis    Hearing loss of left ear    Mixed stress and urge incontinence    Prediabetes    Partial symptomatic epilepsy with complex partial seizures, intractable, without status epilepticus (HCC)    OAB (overactive bladder)    Chronic constipation    Perineal ulcer, limited to breakdown of skin (HCC)    Alteration in cognition    Peripheral vascular disease, unspecified (HCC)    Age-related nuclear cataract of both eyes    Gait disturbance    Syncope, near    Orthostasis    Mild cognitive impairment    Non-seasonal allergic rhinitis    Mixed conductive and sensorineural hearing loss of both ears    Altered mental status    Altered mental status, unspecified altered mental status type    Speech disturbance, unspecified type    Seizures (HCC)    Dementia (HCC)    Hearing loss associated with syndrome of both ears    Eczema    Pedal edema    Hyponatremia           Alondra Burgess MD  10/22/2024  8:10 AM         [1]   Allergies  Allergen Reactions    Carbatrol  [Equetro] UNKNOWN    Diovan [Valsartan] UNKNOWN    Hydrochlorothiazide UNKNOWN    Latex UNKNOWN    Oxcarbazepine UNKNOWN    Sertraline UNKNOWN    Topamax [Topiramate] UNKNOWN    Zonisamide UNKNOWN   [2]   Medications Prior to Admission   Medication Sig Dispense Refill Last Dose/Taking    LEVOTHYROXINE 175 MCG Oral Tab TAKE ONE TABLET BY MOUTH EVERY MORNING 14 tablet 10 Taking    FAMOTIDINE 20 MG Oral Tab TAKE ONE TABLET BY MOUTH EVERY DAY, STOP PROTONIX 14 tablet 10 Taking    ASPIRIN LOW DOSE 81 MG Oral Tab EC TAKE ONE TABLET BY MOUTH EVERY DAY 14 tablet 10 Taking    METOPROLOL TARTRATE 25 MG Oral Tab TAKE ONE TABLET BY MOUTH 2 TIMES A DAY 28 tablet 10 Taking    ATORVASTATIN 20 MG Oral Tab TAKE ONE TABLET BY MOUTH NIGHTLY 14 tablet 10 Taking    CALCIUM CARB-CHOLECALCIFEROL 600-10 MG-MCG Oral Tab TAKE ONE TABLET BY MOUTH EVERY DAY 14 tablet 10 Taking    GNP ARTIFICIAL TEARS 5-6 MG/ML Ophthalmic Solution    Taking    ERGOCALCIFEROL 1.25 MG (97203 UT) Oral Cap TAKE ONE CAPSULE BY MOUTH EVERY 30 DAYS 1 capsule 10 Taking    clonazePAM 1 MG Oral Tab TAKE ONE TABLET BY MOUTH AT BEDTIME 14 tablet 5 Taking    memantine 5 MG Oral Tab TAKE ONE TABLET BY MOUTH EVERY DAY 14 tablet 11 Taking    GAVILAX 17 GM/SCOOP Oral Powder MIX 17 GRAMS IN 8 OUNCE FLUID AND DRINK DAILY AS NEEDED FOR CONSTIPATION 510 g 10 Taking    phenytoin  MG Oral Cap TAKE 2 CAPSULES BY MOUTH MON/FRI MORNINGS AND 1 CAPSULE OTHER 5 DAYS A WEEK IN THE MORNING PLUS 200MG AT BEDTIME TAKE 1 CAP TUES/WED/THURS/SAT/SUN TAKE 2 CAPSULES AT BEDTIME 46 capsule 0 Taking    GEMTESA 75 MG Oral Tab Take 75 mg by mouth daily.   Taking    FLUTICASONE PROPIONATE 50 MCG/ACT Nasal Suspension INSTILL ONE SPRAY INTO EACH NOSTRIL EVERY 12 HOURS 16 g 11 Taking    lacosamide (VIMPAT) 100 MG Oral Tab Take 1 tablet (100 mg total) by mouth 2 (two) times daily. At 0900 am and 2000 60 tablet 0 Taking    fexofenadine (ALLEGRA ALLERGY) 180 MG Oral Tab Take 1 tablet (180 mg total) by  mouth daily. 30 tablet 0 Taking    lactulose 10 GM/15ML Oral Solution Take 15 mL (10 g total) by mouth daily as needed.   Taking As Needed    Omega-3 Fatty Acids (FISH OIL) 1200 MG Oral Cap Take 1,200 mg by mouth daily.   Taking    Phenytoin Sodium Extended 200 MG Oral Cap Take 1 capsule (200 mg total) by mouth nightly. At bedtime   Taking    MAPAP 500 MG Oral Tab TAKE ONE TABLET BY MOUTH EVERY 6 HOUR AS NEEDED FOR PAIN 20 tablet 5 Taking    Calcium Carbonate Antacid (TUMS) 500 MG Oral Chew Tab Chew 1 tablet (500 mg total) by mouth 2 (two) times daily as needed for Heartburn. 28 tablet 1 Taking As Needed    triamcinolone 0.1 % External Cream APPLY 1 APPLICATION TOPICALLY TO AFFECTED AREA 2 TIMES A DAY AS NEEDED FOR PSORIASIS 80 g 2 Unknown    CYANOCOBALAMIN 1000 MCG/ML Injection Solution INJECT 1ML INTRAMUSCULARLY MONTHLY FOR 3 MONTHS 1 mL 0 Unknown    Fluocinolone Acetonide Scalp 0.01 % External Oil Apply to scalp nightly, leave in overnight then wash out each morning.   Unknown    Nystatin 865762 UNIT/GM External Powder Apply 1 Application topically 2 (two) times daily as needed (fungal rash).   Unknown

## 2024-10-22 NOTE — PLAN OF CARE
Assumed pt care this morning at 1530. Transfer from [facility].  Pt is A&Ox4, following commands.   Pt on room air, VSS.  NSR  Pt denies pain.  Pt up x 1 w/ walker. Seen and cleared by PT/OT  Reg diet: sift/easy to chew due to not having dentures.  PIV L AC SL. R forearm infusing K+ in .45.   Per Dr. Peterson: do not open \"electrolyte replacement protocal\" from 10/22, it has already been acounted for.  Neuro checks Q4, Szr precautions.  Pills taken whole x1.  Urinary retention noted prior to transfer to this unit. Pt did ambulate to BR w/ PT and had good output. Continue to monitor urine output and bladder scan as necessary. Straight cath x1 done prior to arrival to this unit.  Bed in lowest position, call light in reach.       Problem: Diabetes/Glucose Control  Goal: Glucose maintained within prescribed range  Description: INTERVENTIONS:  - Monitor Blood Glucose as ordered  - Assess for signs and symptoms of hyperglycemia and hypoglycemia  - Administer ordered medications to maintain glucose within target range  - Assess barriers to adequate nutritional intake and initiate nutrition consult as needed  - Instruct patient on self management of diabetes  Outcome: Progressing     Problem: Delirium  Goal: Minimize duration of delirium  Description: Interventions:  - Encourage use of hearing aids, eye glasses  - Promote highest level of mobility daily  - Provide frequent reorientation  - Promote wakefulness i.e. lights on, blinds open  - Promote sleep, encourage patient's normal rest cycle i.e. lights off, TV off, minimize noise and interruptions  - Encourage family to assist in orientation and promotion of home routines  Outcome: Progressing     Problem: NEUROLOGICAL - ADULT  Goal: Achieves stable or improved neurological status  Description: INTERVENTIONS  - Assess for and report changes in neurological status  - Initiate measures to prevent increased intracranial pressure  - Maintain blood pressure and fluid volume within  ordered parameters to optimize cerebral perfusion and minimize risk of hemorrhage  - Monitor temperature, glucose, and sodium. Initiate appropriate interventions as ordered  Outcome: Progressing  Goal: Absence of seizures  Description: INTERVENTIONS  - Monitor for seizure activity  - Administer anti-seizure medications as ordered  - Monitor neurological status  Outcome: Progressing  Goal: Remains free of injury related to seizure activity  Description: INTERVENTIONS:  - Maintain airway, patient safety  and administer oxygen as ordered  - Monitor patient for seizure activity, document and report duration and description of seizure to MD/LIP  - If seizure occurs, turn patient to side and suction secretions as needed  - Reorient patient post seizure  - Seizure pads on all 4 side rails  - Instruct patient/family to notify RN of any seizure activity  - Instruct patient/family to call for assistance with activity based on assessment  Outcome: Progressing  Goal: Achieves maximal functionality and self care  Description: INTERVENTIONS  - Monitor swallowing and airway patency with patient fatigue and changes in neurological status  - Encourage and assist patient to increase activity and self care with guidance from PT/OT  - Encourage visually impaired, hearing impaired and aphasic patients to use assistive/communication devices  Outcome: Progressing

## 2024-10-22 NOTE — OCCUPATIONAL THERAPY NOTE
OCCUPATIONAL THERAPY EVALUATION - INPATIENT    Room Number: 3610/3610-A  Evaluation Date: 10/22/2024     Type of Evaluation: Initial  Presenting Problem: seizure, hyponatremia, possible UTI    Physician Order: IP Consult to Occupational Therapy  Reason for Therapy:  ADL/IADL Dysfunction and Discharge Planning    OCCUPATIONAL THERAPY ASSESSMENT   Patient is a 84 year old female admitted on 10/21/2024 with Presenting Problem: seizure, hyponatremia, possible UTI. Co-Morbidities : htn, hl, cad, ckd, hypothyroidism, dementia and seizures  Patient is currently functioning near baseline with  all ADL .  Prior to admission, patient's baseline is Modified independent. Has assistance with medication. Pt walks to the dining room with RW.  Patient met all OT goals at baseline level.  Patient reports no further questions/concerns at this time.     EVALUATION SESSION:  Patient at start of session: supine    FUNCTIONAL TRANSFER ASSESSMENT  Sit to Stand: Edge of Bed  Edge of Bed: Modified Independent  Toilet Transfer: Modified Independent    BED MOBILITY  Supine to Sit : Independent    COGNITION  Overall Cognitive Status:  WFL - within functional limits    COGNITION ASSESSMENTS     Upper Extremity:   ROM: within functional limits   Strength: is within functional limits   EDUCATION PROVIDED  Patient Education : Role of Occupational Therapy; Plan of Care; Fall Prevention  Patient's Response to Education: Verbalized Understanding    Equipment used: rw  Demonstrates functional use    Therapist comments: pleasant, independent supine to sit. Modified independent ambulation with rw, toilet transfer, toilet clothing management, and hygiene care. Pt was able to maintain dynamic standing balance while pt was managing the pull up brief.   Alarm on. Pt was left with daughter and PCT.      Patient End of Session: Up in chair;With  staff;Needs met;Call light within reach;RN aware of session/findings;All patient questions and concerns  addressed;Alarm set;Family present    OCCUPATIONAL PROFILE    HOME SITUATION  Type of Home: Assisted living facility ([facility])  Home Layout: One level  Lives With: Staff 24 hours    Toilet and Equipment: Comfort height toilet  Shower/Tub and Equipment: Walk-in shower  Other Equipment:  (rw)          Drives: No  Patient Regularly Uses: Reading glasses    Prior Level of Function: Pt lives at [facility] supportive living. Modified independent ADL, including bathing. Assistance with medication. Pt works at a store in the building. Enjoys needle box.     PAIN ASSESSMENT  Ratin          OBJECTIVE  Precautions: Bed/chair alarm (-150)  Fall Risk: Standard fall risk    WEIGHT BEARING RESTRICTION       AM-PAC ‘6-Clicks’ Inpatient Daily Activity Short Form  -   Putting on and taking off regular lower body clothing?: None  -   Bathing (including washing, rinsing, drying)?: A Little  -   Toileting, which includes using toilet, bedpan or urinal? : None  -   Putting on and taking off regular upper body clothing?: None  -   Taking care of personal grooming such as brushing teeth?: None  -   Eating meals?: None    AM-PAC Score:  Score: 23  Approx Degree of Impairment: 15.86%  Standardized Score (AM-PAC Scale): 51.12    ADDITIONAL TESTS     NEUROLOGICAL FINDINGS      PLAN   Patient has been evaluated and presents with no skilled Occupational Therapy needs at this time.  Patient discharged from Occupational Therapy services.  Please re-order if a new functional limitation presents during this admission.         Patient Evaluation Complexity Level:   Occupational Profile/Medical History LOW - Brief history including review of medical or therapy records    Specific performance deficits impacting engagement in ADL/IADL LOW  1 - 3 performance deficits    Client Assessment/Performance Deficits LOW - No comorbidities nor modifications of tasks    Clinical Decision Making LOW - Analysis of occupational profile,  problem-focused assessments, limited treatment options    Overall Complexity LOW     OT Session Time: 20 minutes  Self-Care Home Management: 6 minutes

## 2024-10-22 NOTE — PROGRESS NOTES
[facility]  Nephrology Progress Note    Alysia Norman Attending:  Alondra Burgess MD       Assessment and Plan:    1) Hyponatremia- in setting of diarrhea / probable UTI / poor solute intake; meds appear benign and unchanged recently. Likely dry on admit; doubt SIADH, endocrinopathy, etc. CXR / CT brain unimpressive. TSH noted. PLAN- adjust IVF (0.45 NS)     2) Witnessed seizure- in part due to hyponatremia in pt with pre-existing sz disorder- as per neuro     3) Dementia in LTC     4) HTN- on metoprolol     5) Hypothyroidism- TSH low / FT4 hi-nl; decrease replacement to 150 mcg daily     6) Probable UTI- empiric CTX / await cx       Subjective:  Undergoing EEG    Physical Exam:   /68   Pulse 77   Temp 97.7 °F (36.5 °C) (Temporal)   Resp 14   Wt 167 lb 12.3 oz (76.1 kg)   SpO2 99%   BMI 30.69 kg/m²   Temp (24hrs), Av.7 °F (37.1 °C), Min:97.7 °F (36.5 °C), Max:99.6 °F (37.6 °C)       Intake/Output Summary (Last 24 hours) at 10/22/2024 0956  Last data filed at 10/22/2024 0610  Gross per 24 hour   Intake 661.12 ml   Output 2975 ml   Net -2313.88 ml     Wt Readings from Last 3 Encounters:   10/22/24 167 lb 12.3 oz (76.1 kg)   24 170 lb (77.1 kg)   11/15/23 175 lb (79.4 kg)     General: awake  HEENT: No scleral icterus, MMM  Neck: Supple, no JULIO or thyromegaly  Cardiac: Regular rate and rhythm, S1, S2 normal, no murmur or tub  Lungs: Decreased BS at bases bilaterally   Abdomen: Soft, non-tender. + bowel sounds, no palpable organomegaly  Extremities: Without clubbing, cyanosis; no edema  Neurologic: Cranial nerves grossly intact, moving all extremities  Skin: Warm and dry, no rashes       Labs:   Lab Results   Component Value Date    WBC 7.5 10/22/2024    HGB 12.5 10/22/2024    HCT 35.9 10/22/2024    .0 10/22/2024    CREATSERUM 0.66 10/22/2024    BUN <5 10/22/2024     10/22/2024    K 3.7 10/22/2024     10/22/2024    CO2 27.0 10/22/2024    GLU 94 10/22/2024    CA 9.2  10/22/2024    ALB 4.4 10/21/2024    ALKPHO 95 10/21/2024    BILT 0.5 10/21/2024    TP 7.2 10/21/2024    AST 27 10/21/2024    ALT 18 10/21/2024    T4F 1.7 10/21/2024    TSH 0.318 10/21/2024    MG 2.2 10/22/2024    MG 2.2 10/22/2024    PHOS 3.5 10/22/2024    PGLU 92 10/22/2024       Imaging:  All imaging studies reviewed.    Meds:   Current Facility-Administered Medications   Medication Dose Route Frequency    lacosamide (Vimpat) 200 mg/20mL injection 100 mg  100 mg Intravenous Q12H    [Held by provider] phenytoin ER (Dilantin) cap 200 mg  200 mg Oral BID    enoxaparin (Lovenox) 40 MG/0.4ML SUBQ injection 40 mg  40 mg Subcutaneous Daily    pantoprazole (Protonix) 40 mg in sodium chloride 0.9% PF 10 mL IV push  40 mg Intravenous Daily    metoprolol (Lopressor) 5 mg/5mL injection 5 mg  5 mg Intravenous Q6H    [Held by provider] levothyroxine (Synthroid) 100 MCG/5ML injection 87.5 mcg  87.5 mcg Intravenous Daily    influenza virus trivalent high dose PF (Fluzone HD) 0.5 mL injection (ages >/= 65 years) 0.5 mL  0.5 mL Intramuscular Prior to discharge    phenytoin (Dilantin) 200 mg in sodium chloride 0.9% IV syringe  200 mg Intravenous 2 times per day    cefTRIAXone (Rocephin) 1 g in sodium chloride 0.9% 100 mL IVPB-ADDV  1 g Intravenous Q24H         Questions/concerns were discussed with patient and/or family by bedside.          Cassandra Peterson MD  10/22/2024  9:56 AM

## 2024-10-22 NOTE — PHYSICAL THERAPY NOTE
PHYSICAL THERAPY EVALUATION - INPATIENT     Room Number: 3610/3610-A  Evaluation Date: 10/22/2024  Type of Evaluation: Initial  Physician Order: PT Eval and Treat    Presenting Problem: seizure  Co-Morbidities : htn, hl, cad, ckd, hypothyroidism, dementia and seizures  Reason for Therapy: Mobility Dysfunction and Discharge Planning    PHYSICAL THERAPY ASSESSMENT   Patient is a 84 year old female admitted 10/21/2024 for serizures.  Prior to admission, patient's baseline is mod I to SBA with RW.  Patient is currently functioning near baseline with bed mobility, transfers, and gait.  Patient is requiring supervision and contact guard assist as a result of the following impairments: decreased functional strength, decreased endurance/aerobic capacity, and decreased muscular endurance.      Patient has met all skilled IPPT goals at this time. Patient will be discharged from Physical Therapy services.  Please re-order if a new functional limitation presents during this admission.      Patient will benefit from continued skilled PT Services at discharge to promote prior level of function.  Anticipate patient will return home with home health PT.    PLAN DURING HOSPITALIZATION  Nursing Mobility Recommendation : 1 Assist  PT Device Recommendation: Rolling walker              PHYSICAL THERAPY MEDICAL/SOCIAL HISTORY  History related to current admission: Patient is a 84 year old female admitted on 10/21/2024: Presents with recurrent seizures , vEEG (-) seizures, dx UTI, hypoNA    HOME SITUATION  Type of Home: Assisted living facility ([facility])  Home Layout: One level                     Lives With: Staff 24 hours    Drives: No   Patient Regularly Uses: Reading glasses      Prior Level of [place]: Reports walks with RW, 1 fall in the past month tripped on a chair and fell backwards, walks to the dining rosen, receives assist with medication management, 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, enjoys making needle point  boxes    SUBJECTIVE  \"I'm a bit more weak than usual\"     OBJECTIVE  Precautions: Bed/chair alarm (-150)  Fall Risk: Standard fall risk    WEIGHT BEARING RESTRICTION     PAIN ASSESSMENT  Ratin          COGNITION  Overall Cognitive Status:  WFL - within functional limits    RANGE OF MOTION AND STRENGTH ASSESSMENT  Upper extremity ROM and strength are within functional limits     Lower extremity ROM is within functional limits     Lower extremity strength is within functional limits     BALANCE  Static Sitting: Good  Dynamic Sitting: Good  Static Standing: Fair -  Dynamic Standing: Fair -    ADDITIONAL TESTS  Additional Tests: Modified Try[place]              Modified [place]: 0                  ACTIVITY TOLERANCE                         O2 WALK       NEUROLOGICAL FINDINGS                        AM-PAC '6-Clicks' INPATIENT SHORT FORM - BASIC MOBILITY  How much difficulty does the patient currently have...  Patient Difficulty: Turning over in bed (including adjusting bedclothes, sheets and blankets)?: None   Patient Difficulty: Sitting down on and standing up from a chair with arms (e.g., wheelchair, bedside commode, etc.): A Little   Patient Difficulty: Moving from lying on back to sitting on the side of the bed?: None   How much help from another person does the patient currently need...   Help from Another: Moving to and from a bed to a chair (including a wheelchair)?: A Little   Help from Another: Need to walk in hospital room?: A Little   Help from Another: Climbing 3-5 steps with a railing?: A Little     AM-PAC Score:  Raw Score: 20   Approx Degree of Impairment: 35.83%   Standardized Score (AM-PAC Scale): 47.67   CMS Modifier (G-Code): CJ    FUNCTIONAL ABILITY STATUS  Gait Assessment   Functional Mobility/Gait Assessment  Gait Assistance: Contact guard assist  Distance (ft): 125  Assistive Device: Rolling walker  Pattern: Shuffle    Skilled Therapy Provided     Bed Mobility:  Rolling: mod I   Supine to  sit: mod I    Sit to supine: NT     Transfer Mobility:  Sit to stand: CGA   Stand to sit: CGA  Gait = CGA    Therapist's Comments: Discussed case with RN prior to session initiation. Pt agreeable to participation in therapy. Participated in gait training with discussion on EC and pacing techniques and minor cuing for RW placement. Discussed session findings with supportive dtr who was present during session - agreeable to plan for [facility].       Exercise/Education Provided:  Energy conservation  Functional activity tolerated  Gait training    Patient End of Session: In bed;Needs met;Call light within reach;RN aware of session/findings;All patient questions and concerns addressed;Alarm set      Patient Evaluation Complexity Level:  History Low - no personal factors and/or co-morbidities   Examination of body systems Low -  addressing 1-2 elements   Clinical Presentation Low- Stable   Clinical Decision Making Low Complexity       PT Session Time: 20 minutes  Gait Training: 10 minutes  Therapeutic Activity:  minutes  Neuromuscular Re-education:  minutes  Therapeutic Exercise:  minutes

## 2024-10-22 NOTE — PROGRESS NOTES
[facility]  Neurocritical Care Progress Note     Alysia Norman Patient Status:  Inpatient    1940 MRN [id number]   Location [facility] 6NE-A Attending Alondra Burgess MD   Hosp Day # 1 PCP Alondar Burgess MD     Subjective:   Patient is a 84 year old female h/o  htn, hl, cad, ckd, hypothyroidism, dementia and seizures p/w recurrent seizures 10/21   Hospital course significant for noted to have generalized shaking c/f seizure-like activity at SNF thus brought to ED where she had recurrent generalized shaking episode, thus given ativan, pht, and vimpat loads, and lab w/u revealed Na 126 and probable UTI     No acute events overnight.    Vitals:   Temp:  [97.7 °F (36.5 °C)-99.6 °F (37.6 °C)] 97.7 °F (36.5 °C)  Pulse:  [] 77  Resp:  [13-31] 14  BP: ()/() 125/68  SpO2:  [94 %-100 %] 99 %  Body mass index is 30.69 kg/m².    Intake/Output:    Intake/Output Summary (Last 24 hours) at 10/22/2024 1055  Last data filed at 10/22/2024 0610  Gross per 24 hour   Intake 661.12 ml   Output 2975 ml   Net -2313.88 ml     Current Meds:  Current Facility-Administered Medications   Medication Dose Route Frequency    potassium chloride 20 mEq in sodium chloride 0.45% 1,000 mL infusion   Intravenous Continuous    lacosamide (Vimpat) 200 mg/20mL injection 100 mg  100 mg Intravenous Q12H    [Held by provider] phenytoin ER (Dilantin) cap 200 mg  200 mg Oral BID    enoxaparin (Lovenox) 40 MG/0.4ML SUBQ injection 40 mg  40 mg Subcutaneous Daily    pantoprazole (Protonix) 40 mg in sodium chloride 0.9% PF 10 mL IV push  40 mg Intravenous Daily    metoprolol (Lopressor) 5 mg/5mL injection 5 mg  5 mg Intravenous Q6H    [Held by provider] levothyroxine (Synthroid) 100 MCG/5ML injection 87.5 mcg  87.5 mcg Intravenous Daily    influenza virus trivalent high dose PF (Fluzone HD) 0.5 mL injection (ages >/= 65 years) 0.5 mL  0.5 mL Intramuscular Prior to discharge    phenytoin (Dilantin) 200 mg in sodium  chloride 0.9% IV syringe  200 mg Intravenous 2 times per day    cefTRIAXone (Rocephin) 1 g in sodium chloride 0.9% 100 mL IVPB-ADDV  1 g Intravenous Q24H     Physical Examination:   General- No acute distress  CV- RRR  Resp- CTA Bilat  Neuro-  Mental status- awake and alert, regards and follows commands, speech fluent  Cranial nerves- pupils equally round and reactive to light, extraocular muscles intact, face symmetric, visual fields full  Motor- singh x4  Sens-  Intact to light touch    Diagnostics:   CT BRAIN OR HEAD (CPT=70450)    Result Date: 10/21/2024  CONCLUSION:   1. Negative for acute intracranial process.    LOCATION:  [id number]   Dictated by (CST): Enrico Ramirez MD on 10/21/2024 at 1:31 PM     Finalized by (CST): Enrico Ramirez MD on 10/21/2024 at 1:33 PM       XR CHEST AP PORTABLE  (CPT=71045)    Result Date: 10/21/2024  CONCLUSION:  Overall stable appearance of the chest.   LOCATION:  [facility]      Dictated by (CST): Edi Mayo MD on 10/21/2024 at 11:54 AM     Finalized by (CST): Edi Mayo MD on 10/21/2024 at 11:57 AM      Lab Review     Recent Labs   Lab 10/21/24  1102 10/21/24  1501 10/21/24  1842 10/22/24  0747   * 128* 131* 139   K 4.2  --   --  3.7   CL 94*  --   --  107   CO2 25.0  --   --  27.0   *  --   --  94   BUN 6*  --   --  <5*   CREATSERUM 0.75  --   --  0.66     Recent Labs   Lab 10/21/24  1102 10/22/24  0747   WBC 14.5* 7.5   HGB 13.7 12.5   .0 157.0     Assesment/Plan:     Neuro:  Recurrent seizure- likely 2/2 underlying seizure do with superimposed UTI and hypoNa.   Ct head no acute changes, will cont home dilantin and vimpat, vEEG neg for seizures, ok to dc.   Cont neurochecks/pt/ot/st.  H/o dementia- cont home meds  Cardiac:  Htn/hl/cad- sbp goal 100-150, cont home meds  Pulmonary:  Stable on RA  Renal:  CKD/HypoNa- management per renal  GI:  PO as tolerated  Heme/ID:  +UA- empiric abx per IM  Endocrine/Rheum:  Monitor glucose, sliding scale insulin prn  DVT  Prophylaxis:  SCD’s, lvx    Goals of the Day: neurochecks   A total of 40 minutes of critical care time (exclusive of billable procedures) was administered to manage and/or prevent neurologic instability. This involved direct patient intervention, complex decision making, and/or extensive discussions with the patient, family, and clinical staff.    Thank you for allowing me to participate in the care of this patient.     Earl Garcia MD, [facility]  Medical Director of System Neurosciences  Chief, Section of Neurocritical Care  [facility]

## 2024-10-22 NOTE — SLP NOTE
ADULT SWALLOWING EVALUATION    ASSESSMENT    ASSESSMENT/OVERALL IMPRESSION:  Patient seen for swallowing evaluation per protocol. Patient presented to ED after witnessed seizure. Patient alert and quite pleasant this morning. She notes intermittent coughing with swallowing liquids and pills at times though no chronic, regular concerns.     Oral mechanism exam remarkable for edentulous status. She reports she does wear partials when eating but they are not present at the time of my visit. SLP presented solid and thin liquid trials. Patient with intact oral retrieval and containment. Oral prep and transit WFL with complete oral clearance. Mastication understandably prolonged but functional. Laryngeal excursion judged to be of adequate strength and timeliness to palpation. There were no overt signs of aspiration noted.     Recommend initiate soft/easy to chew solids and thin liquids observing aspiration precautions and medication administration one at a time with thin liquids. SLP will continue to follow for ongoing assessment of po tolerance and to assess for diet advancement should patient partials present. Updated RN.          RECOMMENDATIONS   Diet Recommendations - Solids: Soft/ Easy to chew  Diet Recommendations - Liquids: Thin Liquids                           Aspiration Precautions: Upright position;Slow rate;Small bites and sips  Medication Administration Recommendations: One pill at a time  Treatment Plan/Recommendations: Aspiration precautions;Dysphagia therapy    HISTORY   MEDICAL HISTORY  Reason for Referral: R/O aspiration    Problem List  Active Problems:    Seizure (HCC)    Recurrent seizures (HCC)    Hyponatremia    SIADH (syndrome of inappropriate ADH production) ([facility])      Past Medical History  Past Medical History:    Anxiety state    Atherosclerosis of coronary artery    Coronary atherosclerosis    Dementia (HCC)    Dementia without behavioral disturbance (HCC)    Disorder of thyroid    Esophageal  reflux    Essential hypertension    GERD without esophagitis    Hearing impairment    left side partial hearing loss    High blood pressure    High cholesterol    History of blood clots    Hyperlipidemia    Hypertensive chronic kidney disease    Hypothyroidism    Incontinence    Muscle weakness    Personal history of venous thrombosis and embolism    Pneumonia due to organism    Presence of coronary angioplasty implant and graft    Psoriasis    Renal disorder    Seizure disorder (HCC)    Visual impairment    Vitamin D deficiency       Prior Living Situation:  ([facility])  Diet Prior to Admission: Regular;Thin liquids  Precautions: Aspiration;Seizure    Patient/Family Goals: to get better    SWALLOWING HISTORY  Current Diet Consistency: Soft/ Easy to Chew;Thin liquids  Dysphagia History: as above  Imaging Results:   Brain CT from 10/21/24 revealed:  CONCLUSION:       1. Negative for acute intracranial process.           LOCATION:  [id number]        Dictated by (CST): Enrico Ramirez MD on 10/21/2024 at 1:31 PM      Finalized by (CST): Enrico Ramirez MD on 10/21/2024 at 1:33 PM      CXR from 10/21/24 revealed:  CONCLUSION:  Overall stable appearance of the chest.         LOCATION:  [facility]                  Dictated by (CST): Edi Mayo MD on 10/21/2024 at 11:54 AM       Finalized by (CST): Edi Mayo MD on 10/21/2024 at 11:57 AM   SUBJECTIVE       OBJECTIVE   ORAL MOTOR EXAMINATION  Dentition: Edentulous (has partials, not present)  Symmetry: Within Functional Limits  Strength: Within Functional Limits  Tone: Within Functional Limits  Range of Motion: Within Functional Limits  Rate of Motion: Within Functional Limits    Voice Quality: Clear  Respiratory Status: Unlabored  Consistencies Trialed: Thin liquids;Hard solid  Method of Presentation: Self presentation  Patient Positioning: Upright;Midline (in bed)    Oral Phase of Swallow: Within Functional Limits                      Pharyngeal Phase of Swallow: Within Functional  Limits           (Please note: Silent aspiration cannot be evaluated clinically. Videofluoroscopic Swallow Study is required to rule-out silent aspiration.)    Esophageal Phase of Swallow: No complaints consistent with possible esophageal involvement              GOALS  Goal #1 The patient will tolerate soft/easy to chew consistency and thin liquids without overt signs or symptoms of aspiration with 100 % accuracy over 1-2 session(s).  In Progress   Goal #2 The patient/family/caregiver will demonstrate understanding and implementation of aspiration precautions and swallow strategies independently over 1-2 session(s).    In Progress   Goal #3 The patient will tolerate trial upgrade of regular consistency and NA liquids without overt signs or symptoms of aspiration with 100 % accuracy over 1-2 session(s).  In Progress     FOLLOW UP  Treatment Plan/Recommendations: Aspiration precautions;Dysphagia therapy  Number of Visits to Meet Established Goals: 1  Follow Up Needed (Documentation Required): Yes  SLP Follow-up Date: 10/24/24    Thank you for your referral.   If you have any questions, please contact Farhat Iraheta MS CCC-SLP  Pager [phone]

## 2024-10-22 NOTE — PLAN OF CARE
Received patient resting in bed, lethargic, follows commands. Continuous EEG. Q2 neuro checks with pupillometer. See flowsheets. Patient more alert this morning and oriented x3.Tolerating 3L NC. NSR. Bps WDL. Requiring straight cath twice overnight, see flowsheets. No BM. IVF discontinued per renal.

## 2024-10-23 VITALS
TEMPERATURE: 99 F | DIASTOLIC BLOOD PRESSURE: 73 MMHG | SYSTOLIC BLOOD PRESSURE: 127 MMHG | OXYGEN SATURATION: 94 % | BODY MASS INDEX: 31 KG/M2 | RESPIRATION RATE: 18 BRPM | HEART RATE: 76 BPM | WEIGHT: 167.75 LBS

## 2024-10-23 LAB
ALBUMIN SERPL-MCNC: 3.7 G/DL (ref 3.2–4.8)
ALBUMIN/GLOB SERPL: 1.7 {RATIO} (ref 1–2)
ALP LIVER SERPL-CCNC: 71 U/L
ALT SERPL-CCNC: 12 U/L
ANION GAP SERPL CALC-SCNC: 5 MMOL/L (ref 0–18)
AST SERPL-CCNC: 19 U/L (ref ?–34)
BASOPHILS # BLD AUTO: 0.04 X10(3) UL (ref 0–0.2)
BASOPHILS NFR BLD AUTO: 0.5 %
BILIRUB SERPL-MCNC: 0.2 MG/DL (ref 0.2–1.1)
BUN BLD-MCNC: 5 MG/DL (ref 9–23)
CALCIUM BLD-MCNC: 8.6 MG/DL (ref 8.7–10.4)
CHLORIDE SERPL-SCNC: 105 MMOL/L (ref 98–112)
CO2 SERPL-SCNC: 26 MMOL/L (ref 21–32)
CREAT BLD-MCNC: 0.62 MG/DL
EGFRCR SERPLBLD CKD-EPI 2021: 89 ML/MIN/1.73M2 (ref 60–?)
EOSINOPHIL # BLD AUTO: 0.32 X10(3) UL (ref 0–0.7)
EOSINOPHIL NFR BLD AUTO: 3.8 %
ERYTHROCYTE [DISTWIDTH] IN BLOOD BY AUTOMATED COUNT: 12.2 %
GLOBULIN PLAS-MCNC: 2.2 G/DL (ref 2–3.5)
GLUCOSE BLD-MCNC: 111 MG/DL (ref 70–99)
HCT VFR BLD AUTO: 31.5 %
HGB BLD-MCNC: 11.3 G/DL
IMM GRANULOCYTES # BLD AUTO: 0.04 X10(3) UL (ref 0–1)
IMM GRANULOCYTES NFR BLD: 0.5 %
LAMOTRIGINE LVL: <1 UG/ML
LYMPHOCYTES # BLD AUTO: 1.39 X10(3) UL (ref 1–4)
LYMPHOCYTES NFR BLD AUTO: 16.6 %
MAGNESIUM SERPL-MCNC: 1.8 MG/DL (ref 1.6–2.6)
MCH RBC QN AUTO: 33.5 PG (ref 26–34)
MCHC RBC AUTO-ENTMCNC: 35.9 G/DL (ref 31–37)
MCV RBC AUTO: 93.5 FL
MONOCYTES # BLD AUTO: 0.85 X10(3) UL (ref 0.1–1)
MONOCYTES NFR BLD AUTO: 10.2 %
NEUTROPHILS # BLD AUTO: 5.71 X10 (3) UL (ref 1.5–7.7)
NEUTROPHILS # BLD AUTO: 5.71 X10(3) UL (ref 1.5–7.7)
NEUTROPHILS NFR BLD AUTO: 68.4 %
OSMOLALITY SERPL CALC.SUM OF ELEC: 280 MOSM/KG (ref 275–295)
PHOSPHATE SERPL-MCNC: 3 MG/DL (ref 2.4–5.1)
PLATELET # BLD AUTO: 149 10(3)UL (ref 150–450)
POTASSIUM SERPL-SCNC: 3.6 MMOL/L (ref 3.5–5.1)
PROT SERPL-MCNC: 5.9 G/DL (ref 5.7–8.2)
RBC # BLD AUTO: 3.37 X10(6)UL
SODIUM SERPL-SCNC: 136 MMOL/L (ref 136–145)
WBC # BLD AUTO: 8.4 X10(3) UL (ref 4–11)

## 2024-10-23 PROCEDURE — 99232 SBSQ HOSP IP/OBS MODERATE 35: CPT

## 2024-10-23 PROCEDURE — 99232 SBSQ HOSP IP/OBS MODERATE 35: CPT | Performed by: INTERNAL MEDICINE

## 2024-10-23 RX ORDER — POTASSIUM CHLORIDE 1500 MG/1
40 TABLET, EXTENDED RELEASE ORAL EVERY 4 HOURS
Status: COMPLETED | OUTPATIENT
Start: 2024-10-23 | End: 2024-10-23

## 2024-10-23 RX ORDER — LEVOTHYROXINE SODIUM 150 UG/1
150 TABLET ORAL
Qty: 30 TABLET | Refills: 0 | Status: SHIPPED | OUTPATIENT
Start: 2024-10-24

## 2024-10-23 RX ORDER — MAGNESIUM OXIDE 400 MG/1
400 TABLET ORAL ONCE
Status: COMPLETED | OUTPATIENT
Start: 2024-10-23 | End: 2024-10-23

## 2024-10-23 RX ORDER — LACOSAMIDE 50 MG/1
100 TABLET ORAL 2 TIMES DAILY
Status: DISCONTINUED | OUTPATIENT
Start: 2024-10-23 | End: 2024-10-23

## 2024-10-23 NOTE — PROGRESS NOTES
[facility]  JONATHAN Neurology Progress Note    Alysia Norman Patient Status:  Inpatient    1940 MRN [id number]   Location [facility] 3NE-A Attending Alondra Burgess MD   Hosp Day # 2 PCP Alondra Burgess MD     CC: Recurrent seizure    Subjective:  Alysia Norman is an  84 year old female with past medical history significant for HTN, HLD, CAD, CKD, hypothyroidism, dementia and seizures who presented with recurrent seizures 10/21. Patient was noted to have a generalized shaking episode at the facility she lives at concerning for seizure-like activity. She was brought to the ED where  she had recurrent generalized shaking episode, thus given ativan, pht, and vimpat loads, and lab work up revealed Na 126 and probable UTI. Patint was admitted to the CNICU for a concern for possible status epilepticus, placed on LTM EEG and monitored closely. No seizures on LTM, remained stable, patent was transferred out yesterday.    Seen for a follow up visit today. Sitting in the chair, awake, alert and oriented x 3-4. Was not able to tell the year correctly and the current president,  but all other questions were answered appropriately. Denies nay headache, no double or blurry vision, no new focal weakness or paresthesias. States her left leg is weaker in general when walking after her knee replacement years ago, also remembers having had some \"close to fainting\" episodes in the hours before she was brought in.    MEDICATIONS:  No current outpatient medications on file.     Current Facility-Administered Medications   Medication Dose Route Frequency    potassium chloride (Klor-Con M20) tab 40 mEq  40 mEq Oral Q4H    phenytoin ER (Dilantin) cap 200 mg  200 mg Oral Nightly    [START ON 10/25/2024] phenytoin ER (Dilantin) cap 100 mg  100 mg Oral Once per day on     And    phenytoin ER (Dilantin) cap 200 mg  200 mg Oral Once per day on     pantoprazole (Protonix)  DR tab 40 mg  40 mg Oral QAM AC    levothyroxine (Synthroid) tab 150 mcg  150 mcg Oral Before breakfast    atorvastatin (Lipitor) tab 20 mg  20 mg Oral Nightly    memantine (Namenda) tab 5 mg  5 mg Oral Nightly    enoxaparin (Lovenox) 40 MG/0.4ML SUBQ injection 40 mg  40 mg Subcutaneous Daily    metoprolol (Lopressor) 5 mg/5mL injection 5 mg  5 mg Intravenous Q6H    influenza virus trivalent high dose PF (Fluzone HD) 0.5 mL injection (ages >/= 65 years) 0.5 mL  0.5 mL Intramuscular Prior to discharge    cefTRIAXone (Rocephin) 1 g in sodium chloride 0.9% 100 mL IVPB-ADDV  1 g Intravenous Q24H       REVIEW OF SYSTEMS:  A 10-point system was reviewed.  Pertinent positives and negatives are noted in HPI.      PHYSICAL EXAMINATION:  VITAL SIGNS: /85 (BP Location: Right arm)   Pulse 82   Temp 98.9 °F (37.2 °C) (Oral)   Resp 18   Wt 167 lb 12.3 oz (76.1 kg)   SpO2 (!) 88%   BMI 30.69 kg/m²   GENERAL:  Patient is a 84 year old female in no acute distress.  HEENT:  Normocephalic, atraumatic  ABD: Soft, non tender  SKIN: Warm, dry, no rashes    NEUROLOGICAL:   Mental status: Oriented to person, place, and situation, not completely to time. Regards and follows all simple commands.   Speech: Fluent, no obvious aphasia or dysarthria  Memory and comprehension: short-term memory and attention slightly impaired, has hx of dementia  Cranial Nerves: VFF, PERRL 3mm brisk, EOMI, no nystagmus, facial sensation intact, face symmetric, tongue midline, shoulder shrug equal, remainder CN intact  Motor: No drift, no focal arm or leg weakness. Motor strength is 5 out of 5 in all extremities.    Sensory: Intact to light touch bilaterally  Coordination: FTN intact bilaterally  Gait: Deferred      Imaging/Diagnostics:  CT BRAIN OR HEAD (CPT=70450)    Result Date: 10/21/2024  CONCLUSION:   1. Negative for acute intracranial process.    LOCATION:  [id number]   Dictated by (CST): Enrico Ramirez MD on 10/21/2024 at 1:31 PM     Finalized by  (CST): Enrico Ramirez MD on 10/21/2024 at 1:33 PM       XR CHEST AP PORTABLE  (CPT=71045)    Result Date: 10/21/2024  CONCLUSION:  Overall stable appearance of the chest.   LOCATION:  [facility]      Dictated by (CST): Edi Mayo MD on 10/21/2024 at 11:54 AM     Finalized by (CST): Edi Mayo MD on 10/21/2024 at 11:57 AM          Labs:  Recent Labs   Lab 10/21/24  1102 10/22/24  0747 10/23/24  0734   RBC 4.08 3.76* 3.37*   HGB 13.7 12.5 11.3*   HCT 37.8 35.9 31.5*   MCV 92.6 95.5 93.5   MCH 33.6 33.2 33.5   MCHC 36.2 34.8 35.9   RDW 11.9 12.3 12.2   NEPRELIM 12.52* 5.25 5.71   WBC 14.5* 7.5 8.4   .0 157.0 149.0*         Recent Labs   Lab 10/21/24  1102 10/21/24  1501 10/22/24  0747 10/22/24  2144 10/23/24  0734   *  --  94  --  111*   BUN 6*  --  <5*  --  5*   CREATSERUM 0.75  --  0.66  --  0.62   EGFRCR 79  --  88  --  89   CA 9.9  --  9.2  --  8.6*   *   < > 139 134* 136   K 4.2  --  3.7  --  3.6   CL 94*  --  107  --  105   CO2 25.0  --  27.0  --  26.0    < > = values in this interval not displayed.       Pre-morbid mRS 1      Assessment and Plan:    An 84 years old female with:    Recurrent seizure - likely secondary to underlying seizure disorder with superimposed UTI and hypoNa.   CT head - no acute abnormality  100/22 LTM EEG - no seizures, discontinued.   Continue same doses antiepileptics dilantin and vimpat as at home.  Seizure precautions  Continue neuro checks  PT/OT  Hx of dementia - continue home meds  UTI - empiric antibiotics per IM  Discussed with patient, discussed with dr. Fragoso. No further inpatient intervention indicated per Neurology point of view. Patient is to follow up with her primary neurologist in 2-4 weeks.   Is this a shared or split note between Advanced Practice Provider and Physician? Adelaide OVERTON  [facility]  10/23/2024, 9:51 AM   [place] # [id number]

## 2024-10-23 NOTE — DISCHARGE INSTRUCTIONS
Sometimes managing your health at home requires assistance.  The [facility] team has recognized your preference to use [facility]. They can be reached by phone at [phone]. The fax number for your reference is [phone]. . A representative from the home health agency will contact you or your family to schedule your first visit.

## 2024-10-23 NOTE — PROGRESS NOTES
[facility]  Nephrology Progress Note    Alysia Norman Attending:  Alondra Burgess MD       Assessment and Plan:    1) Hyponatremia- in setting of diarrhea / probable UTI / poor solute intake; meds appear benign and unchanged recently. Likely dry on admit; doubt SIADH, endocrinopathy, etc. CXR / CT brain unimpressive. TSH noted. PLAN- HL IV     2) Witnessed seizure- in part due to hyponatremia in pt with pre-existing sz disorder- as per neuro     3) Dementia in LTC     4) HTN- on metoprolol     5) Hypothyroidism- TSH low / FT4 hi-nl; decrease replacement to 150 mcg daily     6) Abnormal UA- Cx neg; abx dc'ed       Subjective:  Sitting up looks great in excellent spirits wants to go home    Physical Exam:   /85 (BP Location: Right arm)   Pulse 82   Temp 98.9 °F (37.2 °C) (Oral)   Resp 18   Wt 167 lb 12.3 oz (76.1 kg)   SpO2 (!) 88%   BMI 30.69 kg/m²   Temp (24hrs), Av.1 °F (36.7 °C), Min:97.4 °F (36.3 °C), Max:98.9 °F (37.2 °C)       Intake/Output Summary (Last 24 hours) at 10/23/2024 1023  Last data filed at 10/23/2024 0831  Gross per 24 hour   Intake 240 ml   Output 50 ml   Net 190 ml     Wt Readings from Last 3 Encounters:   10/22/24 167 lb 12.3 oz (76.1 kg)   24 170 lb (77.1 kg)   11/15/23 175 lb (79.4 kg)     General: awake  HEENT: No scleral icterus, MMM  Neck: Supple, no JULIO or thyromegaly  Cardiac: Regular rate and rhythm, S1, S2 normal, no murmur or tub  Lungs: Decreased BS at bases bilaterally   Abdomen: Soft, non-tender. + bowel sounds, no palpable organomegaly  Extremities: Without clubbing, cyanosis; no edema  Neurologic: Cranial nerves grossly intact, moving all extremities  Skin: Warm and dry, no rashes       Labs:   Lab Results   Component Value Date    WBC 8.4 10/23/2024    HGB 11.3 10/23/2024    HCT 31.5 10/23/2024    .0 10/23/2024    CREATSERUM 0.62 10/23/2024    BUN 5 10/23/2024     10/23/2024    K 3.6 10/23/2024     10/23/2024    CO2 26.0 10/23/2024      10/23/2024    CA 8.6 10/23/2024    ALB 3.7 10/23/2024    ALKPHO 71 10/23/2024    BILT 0.2 10/23/2024    TP 5.9 10/23/2024    AST 19 10/23/2024    ALT 12 10/23/2024    MG 1.8 10/23/2024    PHOS 3.0 10/23/2024    PGLU 106 10/22/2024       Imaging:  All imaging studies reviewed.    Meds:   Current Facility-Administered Medications   Medication Dose Route Frequency    potassium chloride (Klor-Con M20) tab 40 mEq  40 mEq Oral Q4H    phenytoin ER (Dilantin) cap 200 mg  200 mg Oral Nightly    [START ON 10/25/2024] phenytoin ER (Dilantin) cap 100 mg  100 mg Oral Once per day on Monday Friday    And    phenytoin ER (Dilantin) cap 200 mg  200 mg Oral Once per day on Sunday Tuesday Wednesday Thursday Saturday    pantoprazole (Protonix) DR tab 40 mg  40 mg Oral QAM AC    levothyroxine (Synthroid) tab 150 mcg  150 mcg Oral Before breakfast    atorvastatin (Lipitor) tab 20 mg  20 mg Oral Nightly    memantine (Namenda) tab 5 mg  5 mg Oral Nightly    enoxaparin (Lovenox) 40 MG/0.4ML SUBQ injection 40 mg  40 mg Subcutaneous Daily    influenza virus trivalent high dose PF (Fluzone HD) 0.5 mL injection (ages >/= 65 years) 0.5 mL  0.5 mL Intramuscular Prior to discharge         Questions/concerns were discussed with patient and/or family by bedside.          Cassandra Peterson MD  10/23/2024  1023 AM

## 2024-10-23 NOTE — CM/SW NOTE
10/23/24 1100   CM/SW Referral Data   Referral Source Social Work (self-referral)   Reason for Referral Discharge planning   Informant EMR   Patient Info   Patient's Home Environment Supportive Living   Post Acute Care Provider Upon Admission  Supportiv   Patient lives with Alone   Discharge Needs   Anticipated D/C needs Home health care     Pt is a 83 y/o female admitted with seizure. SW spoke with RN who stated pt is cleared to discharge today. Chart reviewed and noted pt is a SL[facility] resident at [facility]. Noted therapy is anticipating pt will return home with home healthcare at discharge.     LORENE called [facility] [phone] and spoke with ANGEL. ANGEL stated pt is able to return today and provided number for pt's nurse Mick Carrillo for report ([phone]). DON stated pt has hx with [facility] for [facility] services.     Updated RN. RN stated pt's dtr will transport pt at discharge.     Referral sent to [facility] in [place] and [facility] is able to accept.      [facility]  Nurse Mick Carrillo - [phone]    JACKIE Smith  Discharge Planner

## 2024-10-23 NOTE — PLAN OF CARE
Assumed care at 0730  A/Ox4, RA, VSS  Tele, NSR  Denies n/v & pain  K & Mg replaced per electrolyte protocol   Q4 neurochecks  Seizure precaution  Reg, soft/easy to chew  Pending possible Dc  All needs met at this time    Problem: NEUROLOGICAL - ADULT  Goal: Achieves stable or improved neurological status  Description: INTERVENTIONS  - Assess for and report changes in neurological status  - Initiate measures to prevent increased intracranial pressure  - Maintain blood pressure and fluid volume within ordered parameters to optimize cerebral perfusion and minimize risk of hemorrhage  - Monitor temperature, glucose, and sodium. Initiate appropriate interventions as ordered  Outcome: Progressing  Goal: Absence of seizures  Description: INTERVENTIONS  - Monitor for seizure activity  - Administer anti-seizure medications as ordered  - Monitor neurological status  Outcome: Progressing  Goal: Remains free of injury related to seizure activity  Description: INTERVENTIONS:  - Maintain airway, patient safety  and administer oxygen as ordered  - Monitor patient for seizure activity, document and report duration and description of seizure to MD/LIP  - If seizure occurs, turn patient to side and suction secretions as needed  - Reorient patient post seizure  - Seizure pads on all 4 side rails  - Instruct patient/family to notify RN of any seizure activity  - Instruct patient/family to call for assistance with activity based on assessment  Outcome: Progressing  Goal: Achieves maximal functionality and self care  Description: INTERVENTIONS  - Monitor swallowing and airway patency with patient fatigue and changes in neurological status  - Encourage and assist patient to increase activity and self care with guidance from PT/OT  - Encourage visually impaired, hearing impaired and aphasic patients to use assistive/communication devices  Outcome: Progressing     Problem: Delirium  Goal: Minimize duration of delirium  Description:  Interventions:  - Encourage use of hearing aids, eye glasses  - Promote highest level of mobility daily  - Provide frequent reorientation  - Promote wakefulness i.e. lights on, blinds open  - Promote sleep, encourage patient's normal rest cycle i.e. lights off, TV off, minimize noise and interruptions  - Encourage family to assist in orientation and promotion of home routines  Outcome: Progressing     Problem: Diabetes/Glucose Control  Goal: Glucose maintained within prescribed range  Description: INTERVENTIONS:  - Monitor Blood Glucose as ordered  - Assess for signs and symptoms of hyperglycemia and hypoglycemia  - Administer ordered medications to maintain glucose within target range  - Assess barriers to adequate nutritional intake and initiate nutrition consult as needed  - Instruct patient on self management of diabetes  Outcome: Progressing

## 2024-10-23 NOTE — SLP NOTE
SPEECH DAILY NOTE - INPATIENT    ASSESSMENT & PLAN   ASSESSMENT  Pt seen for dysphagia tx to assess tolerance with recommended diet, ensure proper utilization of aspiration precautions and provide pt/family education.  Patient alert and up in bedside chair. She reports she feels much better this morning. She denied any difficulty with chewing or swallowing breakfast. She reports food texture here is similar to what she was consuming at home. She was able to self present thin liquids by straw with no overt signs of aspiration. Patient current diet is appropriate given dental status and partials not present. Will sign off.     Diet Recommendations - Solids: Soft/ Easy to chew  Diet Recommendations - Liquids: Thin Liquids       Aspiration Precautions: Upright position;Slow rate;Small bites and sips  Medication Administration Recommendations: One pill at a time    Patient Experiencing Pain: No                Treatment Plan  Treatment Plan/Recommendations: Aspiration precautions;Dysphagia therapy              GOALS  Goal #1 The patient will tolerate soft/easy to chew consistency and thin liquids without overt signs or symptoms of aspiration with 100 % accuracy over 1-2 session(s).  Met   Goal #2 The patient/family/caregiver will demonstrate understanding and implementation of aspiration precautions and swallow strategies independently over 1-2 session(s).     Met   Goal #3 The patient will tolerate trial upgrade of regular consistency and NA liquids without overt signs or symptoms of aspiration with 100 % accuracy over 1-2 session(s).  Discontinue     FOLLOW UP  Follow Up Needed (Documentation Required): No  SLP Follow-up Date: 10/24/24  Number of Visits to Meet Established Goals: 1    Session: 1    If you have any questions, please contact Farhat Iraheta MS CCC-SLP  Pager [phone]

## 2024-10-23 NOTE — PLAN OF CARE
NURSING DISCHARGE NOTE    Discharged Home via Wheelchair.  Accompanied by RN  Belongings Taken by patient/family.  IV removed  AVS reviewed  1:1 nurse report

## 2024-10-23 NOTE — PLAN OF CARE
Assumed care of pt at 1930  Pt A&O4, on RA, NSR on tele  Pt denies pain, n/v,  or SOB  Up with 1 assist and walker  Soft/easy to chew diet, does not have dentures  Per Dr. Peterson : Do not open current electrolyte replacement protocol from 10/22, already accounted for  Stopped IV medication per MAR  Neuro checks Q4  Safety and seizure precautions maintained  All needs met at this time    Problem: Diabetes/Glucose Control  Goal: Glucose maintained within prescribed range  Description: INTERVENTIONS:  - Monitor Blood Glucose as ordered  - Assess for signs and symptoms of hyperglycemia and hypoglycemia  - Administer ordered medications to maintain glucose within target range  - Assess barriers to adequate nutritional intake and initiate nutrition consult as needed  - Instruct patient on self management of diabetes  10/22/2024 2313 by Milka Lee  Outcome: Progressing  10/22/2024 2313 by Milka Lee  Outcome: Progressing     Problem: Delirium  Goal: Minimize duration of delirium  Description: Interventions:  - Encourage use of hearing aids, eye glasses  - Promote highest level of mobility daily  - Provide frequent reorientation  - Promote wakefulness i.e. lights on, blinds open  - Promote sleep, encourage patient's normal rest cycle i.e. lights off, TV off, minimize noise and interruptions  - Encourage family to assist in orientation and promotion of home routines  10/22/2024 2313 by Milka Lee  Outcome: Progressing  10/22/2024 2313 by Milka Lee  Outcome: Progressing     Problem: NEUROLOGICAL - ADULT  Goal: Achieves stable or improved neurological status  Description: INTERVENTIONS  - Assess for and report changes in neurological status  - Initiate measures to prevent increased intracranial pressure  - Maintain blood pressure and fluid volume within ordered parameters to optimize cerebral perfusion and minimize risk of hemorrhage  - Monitor temperature, glucose, and sodium. Initiate appropriate interventions as  ordered  10/22/2024 2313 by Milka Lee  Outcome: Progressing  10/22/2024 2313 by Milka Lee  Outcome: Progressing  Goal: Absence of seizures  Description: INTERVENTIONS  - Monitor for seizure activity  - Administer anti-seizure medications as ordered  - Monitor neurological status  10/22/2024 2313 by Milka Lee  Outcome: Progressing  10/22/2024 2313 by Milka Lee  Outcome: Progressing  Goal: Remains free of injury related to seizure activity  Description: INTERVENTIONS:  - Maintain airway, patient safety  and administer oxygen as ordered  - Monitor patient for seizure activity, document and report duration and description of seizure to MD/LIP  - If seizure occurs, turn patient to side and suction secretions as needed  - Reorient patient post seizure  - Seizure pads on all 4 side rails  - Instruct patient/family to notify RN of any seizure activity  - Instruct patient/family to call for assistance with activity based on assessment  10/22/2024 2313 by Milka Lee  Outcome: Progressing  10/22/2024 2313 by Milka Lee  Outcome: Progressing  Goal: Achieves maximal functionality and self care  Description: INTERVENTIONS  - Monitor swallowing and airway patency with patient fatigue and changes in neurological status  - Encourage and assist patient to increase activity and self care with guidance from PT/OT  - Encourage visually impaired, hearing impaired and aphasic patients to use assistive/communication devices  10/22/2024 2313 by Milka Lee  Outcome: Progressing  10/22/2024 2313 by Milka Lee  Outcome: Progressing

## 2024-11-05 ENCOUNTER — LAB REQUISITION (OUTPATIENT)
Dept: LAB | Facility: HOSPITAL | Age: 84
End: 2024-11-05
Payer: MEDICARE

## 2024-11-05 DIAGNOSIS — I10 ESSENTIAL (PRIMARY) HYPERTENSION: ICD-10-CM

## 2024-11-05 DIAGNOSIS — E87.1 HYPO-OSMOLALITY AND HYPONATREMIA: ICD-10-CM

## 2024-11-05 LAB
ANION GAP SERPL CALC-SCNC: 2 MMOL/L (ref 0–18)
BUN BLD-MCNC: 9 MG/DL (ref 9–23)
CALCIUM BLD-MCNC: 9.8 MG/DL (ref 8.7–10.4)
CHLORIDE SERPL-SCNC: 107 MMOL/L (ref 98–112)
CO2 SERPL-SCNC: 28 MMOL/L (ref 21–32)
CREAT BLD-MCNC: 0.6 MG/DL
EGFRCR SERPLBLD CKD-EPI 2021: 90 ML/MIN/1.73M2 (ref 60–?)
FASTING STATUS PATIENT QL REPORTED: YES
GLUCOSE BLD-MCNC: 83 MG/DL (ref 70–99)
OSMOLALITY SERPL CALC.SUM OF ELEC: 282 MOSM/KG (ref 275–295)
POTASSIUM SERPL-SCNC: 3.9 MMOL/L (ref 3.5–5.1)
SODIUM SERPL-SCNC: 137 MMOL/L (ref 136–145)

## 2024-11-05 PROCEDURE — 80048 BASIC METABOLIC PNL TOTAL CA: CPT | Performed by: INTERNAL MEDICINE

## 2024-11-27 PROBLEM — R41.82 ALTERED MENTAL STATUS: Status: RESOLVED | Noted: 2023-05-02 | Resolved: 2024-11-27

## 2024-11-27 PROBLEM — H25.13 AGE-RELATED NUCLEAR CATARACT OF BOTH EYES: Status: RESOLVED | Noted: 2022-03-01 | Resolved: 2024-11-27

## 2024-11-27 PROBLEM — R41.82 ALTERED MENTAL STATUS, UNSPECIFIED ALTERED MENTAL STATUS TYPE: Status: RESOLVED | Noted: 2023-05-02 | Resolved: 2024-11-27

## 2024-11-27 PROBLEM — R41.89 ALTERATION IN COGNITION: Status: RESOLVED | Noted: 2021-12-20 | Resolved: 2024-11-27

## 2024-11-27 PROBLEM — L98.491: Status: RESOLVED | Noted: 2021-12-20 | Resolved: 2024-11-27

## 2024-12-03 ENCOUNTER — LAB REQUISITION (OUTPATIENT)
Dept: LAB | Facility: HOSPITAL | Age: 84
End: 2024-12-03
Payer: MEDICARE

## 2024-12-03 DIAGNOSIS — R56.9 UNSPECIFIED CONVULSIONS (HCC): ICD-10-CM

## 2024-12-03 DIAGNOSIS — G40.219 LOCALIZATION-RELATED (FOCAL) (PARTIAL) SYMPTOMATIC EPILEPSY AND EPILEPTIC SYNDROMES WITH COMPLEX PARTIAL SEIZURES, INTRACTABLE, WITHOUT STATUS EPILEPTICUS (HCC): ICD-10-CM

## 2024-12-03 PROCEDURE — 80186 ASSAY OF PHENYTOIN FREE: CPT | Performed by: INTERNAL MEDICINE

## 2024-12-03 PROCEDURE — 80185 ASSAY OF PHENYTOIN TOTAL: CPT | Performed by: INTERNAL MEDICINE

## 2024-12-05 LAB
PHENYTOIN, FREE, SERUM: 2 UG/ML
PHENYTOIN, SERUM: 20.7 UG/ML

## 2025-01-07 ENCOUNTER — LAB REQUISITION (OUTPATIENT)
Dept: LAB | Facility: HOSPITAL | Age: 85
End: 2025-01-07
Payer: MEDICARE

## 2025-01-07 DIAGNOSIS — G40.219 LOCALIZATION-RELATED (FOCAL) (PARTIAL) SYMPTOMATIC EPILEPSY AND EPILEPTIC SYNDROMES WITH COMPLEX PARTIAL SEIZURES, INTRACTABLE, WITHOUT STATUS EPILEPTICUS (HCC): ICD-10-CM

## 2025-01-07 DIAGNOSIS — R56.9 UNSPECIFIED CONVULSIONS (HCC): ICD-10-CM

## 2025-01-07 LAB — PHENYTOIN SERPL-MCNC: 21.9 UG/ML (ref 10–20)

## 2025-01-07 PROCEDURE — 80185 ASSAY OF PHENYTOIN TOTAL: CPT | Performed by: INTERNAL MEDICINE

## 2025-01-14 ENCOUNTER — LAB REQUISITION (OUTPATIENT)
Dept: LAB | Facility: HOSPITAL | Age: 85
End: 2025-01-14
Payer: MEDICARE

## 2025-01-14 DIAGNOSIS — G40.219 LOCALIZATION-RELATED (FOCAL) (PARTIAL) SYMPTOMATIC EPILEPSY AND EPILEPTIC SYNDROMES WITH COMPLEX PARTIAL SEIZURES, INTRACTABLE, WITHOUT STATUS EPILEPTICUS (HCC): ICD-10-CM

## 2025-01-14 DIAGNOSIS — R56.9 UNSPECIFIED CONVULSIONS (HCC): ICD-10-CM

## 2025-01-14 LAB — PHENYTOIN SERPL-MCNC: 21.7 UG/ML (ref 10–20)

## 2025-01-14 PROCEDURE — 80185 ASSAY OF PHENYTOIN TOTAL: CPT | Performed by: INTERNAL MEDICINE

## 2025-01-23 ENCOUNTER — LAB REQUISITION (OUTPATIENT)
Dept: LAB | Facility: HOSPITAL | Age: 85
End: 2025-01-23
Payer: MEDICARE

## 2025-01-23 DIAGNOSIS — R56.9 UNSPECIFIED CONVULSIONS (HCC): ICD-10-CM

## 2025-01-23 DIAGNOSIS — G40.219 LOCALIZATION-RELATED (FOCAL) (PARTIAL) SYMPTOMATIC EPILEPSY AND EPILEPTIC SYNDROMES WITH COMPLEX PARTIAL SEIZURES, INTRACTABLE, WITHOUT STATUS EPILEPTICUS (HCC): ICD-10-CM

## 2025-01-23 LAB
ALBUMIN SERPL-MCNC: 3.8 G/DL (ref 3.2–4.8)
ALBUMIN/GLOB SERPL: 1.7 {RATIO} (ref 1–2)
ALP LIVER SERPL-CCNC: 66 U/L
ALT SERPL-CCNC: 15 U/L
ANION GAP SERPL CALC-SCNC: 8 MMOL/L (ref 0–18)
AST SERPL-CCNC: 18 U/L (ref ?–34)
BILIRUB SERPL-MCNC: 0.2 MG/DL (ref 0.2–1.1)
BUN BLD-MCNC: 11 MG/DL (ref 9–23)
CALCIUM BLD-MCNC: 9.1 MG/DL (ref 8.7–10.6)
CHLORIDE SERPL-SCNC: 106 MMOL/L (ref 98–112)
CO2 SERPL-SCNC: 28 MMOL/L (ref 21–32)
CREAT BLD-MCNC: 0.69 MG/DL
EGFRCR SERPLBLD CKD-EPI 2021: 87 ML/MIN/1.73M2 (ref 60–?)
ERYTHROCYTE [DISTWIDTH] IN BLOOD BY AUTOMATED COUNT: 12.9 %
EST. AVERAGE GLUCOSE BLD GHB EST-MCNC: 111 MG/DL (ref 68–126)
FASTING STATUS PATIENT QL REPORTED: YES
GLOBULIN PLAS-MCNC: 2.2 G/DL (ref 2–3.5)
GLUCOSE BLD-MCNC: 82 MG/DL (ref 70–99)
HBA1C MFR BLD: 5.5 % (ref ?–5.7)
HCT VFR BLD AUTO: 33.7 %
HGB BLD-MCNC: 12 G/DL
MCH RBC QN AUTO: 34.2 PG (ref 26–34)
MCHC RBC AUTO-ENTMCNC: 35.6 G/DL (ref 31–37)
MCV RBC AUTO: 96 FL
OSMOLALITY SERPL CALC.SUM OF ELEC: 292 MOSM/KG (ref 275–295)
PHENYTOIN SERPL-MCNC: 15.7 UG/ML (ref 10–20)
PLATELET # BLD AUTO: 196 10(3)UL (ref 150–450)
POTASSIUM SERPL-SCNC: 4.1 MMOL/L (ref 3.5–5.1)
PROT SERPL-MCNC: 6 G/DL (ref 5.7–8.2)
RBC # BLD AUTO: 3.51 X10(6)UL
SODIUM SERPL-SCNC: 142 MMOL/L (ref 136–145)
T4 FREE SERPL-MCNC: 1.4 NG/DL (ref 0.8–1.7)
TSI SER-ACNC: 2.07 UIU/ML (ref 0.55–4.78)
WBC # BLD AUTO: 7 X10(3) UL (ref 4–11)

## 2025-01-23 PROCEDURE — 85027 COMPLETE CBC AUTOMATED: CPT | Performed by: INTERNAL MEDICINE

## 2025-01-23 PROCEDURE — 80053 COMPREHEN METABOLIC PANEL: CPT | Performed by: INTERNAL MEDICINE

## 2025-01-23 PROCEDURE — 84439 ASSAY OF FREE THYROXINE: CPT | Performed by: INTERNAL MEDICINE

## 2025-01-23 PROCEDURE — 84443 ASSAY THYROID STIM HORMONE: CPT | Performed by: INTERNAL MEDICINE

## 2025-01-23 PROCEDURE — 83036 HEMOGLOBIN GLYCOSYLATED A1C: CPT | Performed by: INTERNAL MEDICINE

## 2025-01-23 PROCEDURE — 80185 ASSAY OF PHENYTOIN TOTAL: CPT | Performed by: INTERNAL MEDICINE

## 2025-01-28 ENCOUNTER — APPOINTMENT (OUTPATIENT)
Dept: CT IMAGING | Facility: HOSPITAL | Age: 85
End: 2025-01-28
Attending: EMERGENCY MEDICINE
Payer: MEDICARE

## 2025-01-28 ENCOUNTER — HOSPITAL ENCOUNTER (EMERGENCY)
Facility: HOSPITAL | Age: 85
Discharge: HOME | End: 2025-01-28
Attending: EMERGENCY MEDICINE
Payer: MEDICARE

## 2025-01-28 VITALS
OXYGEN SATURATION: 99 % | WEIGHT: 172 LBS | TEMPERATURE: 98 F | BODY MASS INDEX: 31.65 KG/M2 | DIASTOLIC BLOOD PRESSURE: 85 MMHG | HEIGHT: 62 IN | RESPIRATION RATE: 18 BRPM | SYSTOLIC BLOOD PRESSURE: 175 MMHG | HEART RATE: 76 BPM

## 2025-01-28 DIAGNOSIS — S09.8XXA BLUNT HEAD TRAUMA, INITIAL ENCOUNTER: Primary | ICD-10-CM

## 2025-01-28 PROCEDURE — 99284 EMERGENCY DEPT VISIT MOD MDM: CPT

## 2025-01-28 PROCEDURE — 70450 CT HEAD/BRAIN W/O DYE: CPT | Performed by: EMERGENCY MEDICINE

## 2025-01-28 NOTE — DISCHARGE INSTRUCTIONS
Rest at home  Ice to areas of pain at home  Return to the ER if symptoms worsen or if any other problems arise

## 2025-01-28 NOTE — ED INITIAL ASSESSMENT (HPI)
Patient is from [facility], reports slip and fall upon opening the store inside of there and hit her head. Denies LOC. Hematoma present to head. Denies neck pain. On a baby ASA.

## 2025-01-28 NOTE — ED PROVIDER NOTES
Patient Seen in: [facility] Emergency Department      History     Chief Complaint   Patient presents with    Fall    Head Neck Injury     Stated Complaint: Fall, head injury    Subjective:   HPI      Patient is a 84-year-old female presents emergency room with history of slipping and falling upon opening the store and hit her head and the back of her head only.  The patient does not take any blood thinners at this time.  The patient had no loss of consciousness.  Patient denies any neck or back pain.  The patient denies history of any precipitating factors to her fall but states that she just tripped.  The patient denies history of any other somatic complaints or discomfort at this time.  The patient has been acting appropriately as per family at the bedside.    Objective:     Past Medical History:    Anxiety state    Atherosclerosis of coronary artery    Coronary atherosclerosis    Dementia (HCC)    Dementia without behavioral disturbance (HCC)    Disorder of thyroid    Esophageal reflux    Essential hypertension    GERD without esophagitis    Hearing impairment    left side partial hearing loss    High blood pressure    High cholesterol    History of blood clots    Hyperlipidemia    Hypertensive chronic kidney disease    Hypothyroidism    Incontinence    Muscle weakness    Personal history of venous thrombosis and embolism    Pneumonia due to organism    Presence of coronary angioplasty implant and graft    Psoriasis    Renal disorder    Seizure disorder (HCC)    Visual impairment    Vitamin D deficiency              Past Surgical History:   Procedure Laterality Date    Cath bare metal stent (bms)  05/03/2011    xience V    Colonoscopy      Hysterectomy      Total knee replacement      Left knee                Social History     Socioeconomic History    Marital status:    Tobacco Use    Smoking status: Never    Smokeless tobacco: Never   Vaping Use    Vaping status: Never Used   Substance and Sexual  Activity    Alcohol use: Not Currently    Drug use: Never     Social Drivers of Health     Food Insecurity: No Food Insecurity (10/21/2024)    Food Insecurity     Food Insecurity: Never true   Transportation Needs: Unknown (10/21/2024)    Transportation Needs     Lack of Transportation: Patient unable to answer   Housing Stability: Unknown (10/21/2024)    Housing Stability     Housing Instability: Patient unable to answer                  Physical Exam     ED Triage Vitals [01/28/25 1343]   BP (!) 146/93   Pulse 75   Resp 18   Temp 97.9 °F (36.6 °C)   Temp src Temporal   SpO2 98 %   O2 Device None (Room air)       Current Vitals:   Vital Signs  BP: (!) 175/85  Pulse: 76  Resp: 18  Temp: 97.9 °F (36.6 °C)  Temp src: Temporal    Oxygen Therapy  SpO2: 99 %  O2 Device: None (Room air)        Physical Exam  GENERAL: Well-developed, well-nourished female sitting up breathing easily in no apparent distress.  Patient is nontoxic appearance.  HEENT: Head is normocephalic.  Patient has a contusion noted to the is distal aspect of the scalp.  There is no evidence of any scalp laceration appreciated.  Pupils are 4 mm equally round and reactive to light. Oropharynx is clear. Mucous membranes are moist.  NECK: There is no focal tenderness to palpation appreciated.   LUNGS: Clear to auscultation bilaterally with no wheeze. There is good equal air entry bilaterally.  HEART: Regular rate and rhythm. Normal S1, S2 no S3, or S4. No murmur.  ABDOMEN: There is no focal tenderness to palpation appreciated anywhere throughout the abdomen. There is no guarding, no rebound, no mass, and no organomegaly appreciated. There is normoactive bowel sounds.  EXTREMITIES: There is no cyanosis, clubbing, or edema appreciated. Pulses are 2+ and equal in all 4 extremities.  NEURO: Patient is awake, alert and oriented to time place and person. Motor strength is 5 over 5 in all 4 extremities. There are no gross motor or sensory deficits appreciated.  Cranial nerves II through XII are intact.  Patient answering all questions appropriately.          ED Course   Labs Reviewed - No data to display  I personally reviewed the patient's CT scan of the brain images and my individual to potation shows no evidence of any skull fracture or intracerebral bleed.  I also reviewed official radiology report which showed results as noted below.       CT BRAIN OR HEAD (CPT=70450)    Result Date: 1/28/2025  CONCLUSION:  No acute intracranial process.    LOCATION:  [facility]   Dictated by (CST): Mak Wilkes MD on 1/28/2025 at 3:13 PM     Finalized by (CST): Mak Wilkes MD on 1/28/2025 at 3:15 PM             MDM      Patient underwent CT scan with results as noted above.  Patient sitting back and breathing easily in no apparent distress and is acting normally as per family at the bedside.  Patient will be discharged to home at this time with head injury instructions instructions to return to the ER immediately if symptoms worsen or if any other problems arise.  Patient discharged to home at this time.        Medical Decision Making      Disposition and Plan     Clinical Impression:  1. Blunt head trauma, initial encounter         Disposition:  Discharge  1/28/2025  4:02 pm    Follow-up:  Alondra Burgess MD  [address] [place] [address]  [address] [place] [id number]  [phone]    Follow up in 2 day(s)            Medications Prescribed:  Current Discharge Medication List              Supplementary Documentation:

## 2025-01-29 ENCOUNTER — PATIENT OUTREACH (OUTPATIENT)
Dept: CASE MANAGEMENT | Age: 85
End: 2025-01-29

## 2025-01-29 RX ORDER — PHENYTOIN 50 MG/1
50 TABLET, CHEWABLE ORAL EVERY MORNING
COMMUNITY
Start: 2025-01-24

## 2025-01-29 NOTE — PROGRESS NOTES
Left message on mailbox for patient's POA to call nurse care manager back for transitions of care call.  Nurse care  information included in message.          Transitions of Care Navigation  Discharge Date: 25  Contact Date: 2025    Transitions of Care Assessment:  AMBER Initial Assessment    General:  Assessment completed with: Children  Patient Subjective: Spoke with daughter Kristina.  She reports she just spoke with patient and she is feeling better.  Reports swelling on head is going down.  Patient didn't complain of any pain. Daughter does have a copy of the AVS and is aware to have patient go to emergency room or call 911 if she were to begin having any of the symptoms on page 4 that are under heading \"watch for the following symptoms\".  Medications reviewed.  Daughter did state she doesn't have a current list but believes what is on the AVS is correct.  She will be dropping off the AVS at the assisted living facility and speaking with nurse to confirm medications are the same as listed.  If they are not she will call nurse care manager back to report changes. Daughter declined follow up appointment with primary care physician. Daughter also declined further outreach calls from nurse care manager as she states assisted living facility will keep a close eye on patient and will report any additional symptoms to her.  Daughter did not have any additional questions or concerns.  Chief Complaint: Blunt head trauma, initial encounter  Verify patient name and  with patient/ caregiver: Yes    Hospital Stay/Discharge:  Tell me what you understand of why you were in the hospital or emergency department: My mom fell  Prior to leaving the hospital were your Discharge Instructions reviewed with you?: Yes  Did you receive a copy of your written Discharge Instructions?: Yes  What questions do you have about your Discharge Instructions?: Daughter did not have any additonal questions  Do you feel  better or worse since you left the hospital or emergency department?: Better    Follow - Up Appointment:  Do you have a follow-up appointment?: No  Are there any barriers to getting to your follow-up appointment?: No    Home Health/DME:  Prior to leaving the hospital was Home Health (HH) arranged for you?: N/A  Are HH needs identified by staff during the assessment?: No     Prior to leaving the hospital or emergency department was Durable Medical Equipment (DME), medical supplies, or infusions arranged for you?: N/A  Are DME/medical supply/infusions needs identified by staff during this assessment?: No     Medications/Diet:  Did any of your medications change, during or after your hospital stay or ED visit?: No  Do you understand what your medications are for and possible side effects?: Yes  Are there any reasons that keep you from taking your medication as prescribed?: No  Any concerns about medication refills?: No    Were you given a different diet per your Discharge Instructions?: No  Reason: n/a     Questions/Concerns:  Do you have any questions or concerns that have not been discussed?: No       Nursing Interventions:  Assessed head pain and for other signs/symptoms.  Reviewed medications. Instructed when to return to emergency room. Encouraged appointment with primary care physician and/or specialist.     Medications:  Medication Reconciliation:  I am aware of an inpatient discharge within the last 30 days.  The discharge medication list has been reconciled with the patient's current medication list and reviewed by me. See medication list for additions of new medication, and changes to current doses of medications and discontinued medications.  Current Outpatient Medications   Medication Sig Dispense Refill    phenytoin 50 MG Oral Chew Tab Chew 1 tablet (50 mg total) by mouth every morning. Take with 100 mg tablet to equal 150 mg.      CLONAZEPAM 1 MG Oral Tab TAKE ONE TABLET BY MOUTH AT BEDTIME 14 tablet 4     LEVOTHYROXINE 150 MCG Oral Tab TAKE ONE TABLET BY MOUTH EVERY MORNING BEFORE BREAKFAST 14 tablet 10    Vibegron (GEMTESA) 75 MG Oral Tab TAKE ONE TABLET BY MOUTH EVERY DAY 14 tablet 11    phenytoin  MG Oral Cap Take 1 capsule (100 mg total) by mouth every morning. Take with 50 mg tablet to equal 150 mg.      fluticasone propionate 50 MCG/ACT Nasal Suspension 1 spray by Each Nare route every 12 (twelve) hours.      hydrocortisone 2.5 % External Ointment Apply 1 Application topically 2 (two) times daily as needed (to scalp for psoriasis).      hydrocortisone (PROCTOSOL HC) 2.5 % External Cream Place 1 Application rectally Q24H PRN for Hemorrhoids.      FAMOTIDINE 20 MG Oral Tab TAKE ONE TABLET BY MOUTH EVERY DAY, STOP PROTONIX 14 tablet 10    ASPIRIN LOW DOSE 81 MG Oral Tab EC TAKE ONE TABLET BY MOUTH EVERY DAY 14 tablet 10    METOPROLOL TARTRATE 25 MG Oral Tab TAKE ONE TABLET BY MOUTH 2 TIMES A DAY 28 tablet 10    ATORVASTATIN 20 MG Oral Tab TAKE ONE TABLET BY MOUTH NIGHTLY 14 tablet 10    CALCIUM CARB-CHOLECALCIFEROL 600-10 MG-MCG Oral Tab TAKE ONE TABLET BY MOUTH EVERY DAY 14 tablet 10    GNP ARTIFICIAL TEARS 5-6 MG/ML Ophthalmic Solution Place 1 drop into both eyes every 4 (four) hours as needed (dry eyes).      ERGOCALCIFEROL 1.25 MG (00672 UT) Oral Cap TAKE ONE CAPSULE BY MOUTH EVERY 30 DAYS 1 capsule 10    GAVILAX 17 GM/SCOOP Oral Powder MIX 17 GRAMS IN 8 OUNCE FLUID AND DRINK DAILY AS NEEDED FOR CONSTIPATION (Patient taking differently: Take 17 g by mouth Every Monday, Wednesday, and Friday.) 510 g 10    CYANOCOBALAMIN 1000 MCG/ML Injection Solution INJECT 1ML INTRAMUSCULARLY MONTHLY FOR 3 MONTHS 1 mL 0    lacosamide (VIMPAT) 100 MG Oral Tab Take 1 tablet (100 mg total) by mouth 2 (two) times daily. At 0900 am and 2000 60 tablet 0    fexofenadine (ALLEGRA ALLERGY) 180 MG Oral Tab Take 1 tablet (180 mg total) by mouth daily. 30 tablet 0    lactulose 10 GM/15ML Oral Solution Take 15 mL (10 g total) by  mouth daily as needed.      Omega-3 Fatty Acids (FISH OIL) 1200 MG Oral Cap Take 1,200 mg by mouth daily.      Phenytoin Sodium Extended 200 MG Oral Cap Take 1 capsule (200 mg total) by mouth nightly.      MAPAP 500 MG Oral Tab TAKE ONE TABLET BY MOUTH EVERY 6 HOUR AS NEEDED FOR PAIN 20 tablet 5    Calcium Carbonate Antacid (TUMS) 500 MG Oral Chew Tab Chew 1 tablet (500 mg total) by mouth 2 (two) times daily as needed for Heartburn. 28 tablet 1           Follow-up Appointments:  None       Transitional [facility] Clinic  Was TCC Ordered: No    Primary Care Provider (If no TCC appointment)  Does patient already have a PCP appointment scheduled? No  Nurse Care Manager Attempted to schedule PCP office ER Follow-up appointment with patient   -If no appointment scheduled: Explain daughter declined     Specialist  Does the patient have any other follow-up appointment(s) need to be scheduled? No         Book By Date: 2/4/25

## 2025-01-30 ENCOUNTER — LAB REQUISITION (OUTPATIENT)
Dept: LAB | Facility: HOSPITAL | Age: 85
End: 2025-01-30
Payer: MEDICARE

## 2025-01-30 DIAGNOSIS — R56.9 UNSPECIFIED CONVULSIONS (HCC): ICD-10-CM

## 2025-01-30 DIAGNOSIS — G40.219 LOCALIZATION-RELATED (FOCAL) (PARTIAL) SYMPTOMATIC EPILEPSY AND EPILEPTIC SYNDROMES WITH COMPLEX PARTIAL SEIZURES, INTRACTABLE, WITHOUT STATUS EPILEPTICUS (HCC): ICD-10-CM

## 2025-01-30 LAB — PHENYTOIN SERPL-MCNC: 18.4 UG/ML (ref 10–20)

## 2025-01-30 PROCEDURE — 80185 ASSAY OF PHENYTOIN TOTAL: CPT | Performed by: INTERNAL MEDICINE

## 2025-02-07 ENCOUNTER — LAB ENCOUNTER (OUTPATIENT)
Dept: LAB | Age: 85
End: 2025-02-07
Attending: INTERNAL MEDICINE
Payer: MEDICARE

## 2025-02-11 ENCOUNTER — LAB REQUISITION (OUTPATIENT)
Dept: LAB | Facility: HOSPITAL | Age: 85
End: 2025-02-11
Payer: MEDICARE

## 2025-02-11 DIAGNOSIS — G40.909 EPILEPSY, UNSPECIFIED, NOT INTRACTABLE, WITHOUT STATUS EPILEPTICUS (HCC): ICD-10-CM

## 2025-02-11 DIAGNOSIS — R56.9 UNSPECIFIED CONVULSIONS (HCC): ICD-10-CM

## 2025-02-11 DIAGNOSIS — G40.219 LOCALIZATION-RELATED (FOCAL) (PARTIAL) SYMPTOMATIC EPILEPSY AND EPILEPTIC SYNDROMES WITH COMPLEX PARTIAL SEIZURES, INTRACTABLE, WITHOUT STATUS EPILEPTICUS (HCC): ICD-10-CM

## 2025-02-11 LAB — PHENYTOIN SERPL-MCNC: 20.9 UG/ML (ref 10–20)

## 2025-02-11 PROCEDURE — 80185 ASSAY OF PHENYTOIN TOTAL: CPT | Performed by: INTERNAL MEDICINE

## 2025-02-18 ENCOUNTER — LAB REQUISITION (OUTPATIENT)
Dept: LAB | Facility: HOSPITAL | Age: 85
End: 2025-02-18
Payer: MEDICARE

## 2025-02-18 DIAGNOSIS — G40.219 LOCALIZATION-RELATED (FOCAL) (PARTIAL) SYMPTOMATIC EPILEPSY AND EPILEPTIC SYNDROMES WITH COMPLEX PARTIAL SEIZURES, INTRACTABLE, WITHOUT STATUS EPILEPTICUS (HCC): ICD-10-CM

## 2025-02-18 DIAGNOSIS — R56.9 UNSPECIFIED CONVULSIONS (HCC): ICD-10-CM

## 2025-02-18 LAB — PHENYTOIN SERPL-MCNC: 17.6 UG/ML (ref 10–20)

## 2025-02-18 PROCEDURE — 80185 ASSAY OF PHENYTOIN TOTAL: CPT | Performed by: INTERNAL MEDICINE

## 2025-03-04 ENCOUNTER — LAB REQUISITION (OUTPATIENT)
Dept: LAB | Facility: HOSPITAL | Age: 85
End: 2025-03-04
Payer: MEDICARE

## 2025-03-04 DIAGNOSIS — G40.219 LOCALIZATION-RELATED (FOCAL) (PARTIAL) SYMPTOMATIC EPILEPSY AND EPILEPTIC SYNDROMES WITH COMPLEX PARTIAL SEIZURES, INTRACTABLE, WITHOUT STATUS EPILEPTICUS (HCC): ICD-10-CM

## 2025-03-04 DIAGNOSIS — R56.9 UNSPECIFIED CONVULSIONS (HCC): ICD-10-CM

## 2025-03-04 LAB — PHENYTOIN SERPL-MCNC: 21 UG/ML (ref 10–20)

## 2025-03-04 PROCEDURE — 80185 ASSAY OF PHENYTOIN TOTAL: CPT | Performed by: INTERNAL MEDICINE

## 2025-03-18 ENCOUNTER — LAB REQUISITION (OUTPATIENT)
Dept: LAB | Facility: HOSPITAL | Age: 85
End: 2025-03-18
Payer: MEDICARE

## 2025-03-18 DIAGNOSIS — G40.219 LOCALIZATION-RELATED (FOCAL) (PARTIAL) SYMPTOMATIC EPILEPSY AND EPILEPTIC SYNDROMES WITH COMPLEX PARTIAL SEIZURES, INTRACTABLE, WITHOUT STATUS EPILEPTICUS (HCC): ICD-10-CM

## 2025-03-18 DIAGNOSIS — R56.9 UNSPECIFIED CONVULSIONS (HCC): ICD-10-CM

## 2025-03-18 LAB — PHENYTOIN SERPL-MCNC: 18.3 UG/ML (ref 10–20)

## 2025-03-18 PROCEDURE — 80185 ASSAY OF PHENYTOIN TOTAL: CPT | Performed by: INTERNAL MEDICINE

## 2025-04-22 ENCOUNTER — LAB REQUISITION (OUTPATIENT)
Dept: LAB | Facility: HOSPITAL | Age: 85
End: 2025-04-22
Payer: MEDICARE

## 2025-04-22 DIAGNOSIS — G40.219 LOCALIZATION-RELATED (FOCAL) (PARTIAL) SYMPTOMATIC EPILEPSY AND EPILEPTIC SYNDROMES WITH COMPLEX PARTIAL SEIZURES, INTRACTABLE, WITHOUT STATUS EPILEPTICUS (HCC): ICD-10-CM

## 2025-04-22 DIAGNOSIS — R56.9 UNSPECIFIED CONVULSIONS (HCC): ICD-10-CM

## 2025-04-22 DIAGNOSIS — G40.909 EPILEPSY, UNSPECIFIED, NOT INTRACTABLE, WITHOUT STATUS EPILEPTICUS (HCC): ICD-10-CM

## 2025-04-22 LAB — PHENYTOIN SERPL-MCNC: 20.4 UG/ML (ref 10–20)

## 2025-04-22 PROCEDURE — 80185 ASSAY OF PHENYTOIN TOTAL: CPT | Performed by: INTERNAL MEDICINE

## 2025-04-30 ENCOUNTER — HOSPITAL ENCOUNTER (EMERGENCY)
Facility: HOSPITAL | Age: 85
Discharge: HOME | End: 2025-04-30
Attending: EMERGENCY MEDICINE
Payer: MEDICARE

## 2025-04-30 ENCOUNTER — APPOINTMENT (OUTPATIENT)
Dept: CT IMAGING | Facility: HOSPITAL | Age: 85
End: 2025-04-30
Payer: MEDICARE

## 2025-04-30 VITALS
OXYGEN SATURATION: 100 % | BODY MASS INDEX: 29 KG/M2 | TEMPERATURE: 98 F | WEIGHT: 159 LBS | SYSTOLIC BLOOD PRESSURE: 172 MMHG | DIASTOLIC BLOOD PRESSURE: 91 MMHG | RESPIRATION RATE: 20 BRPM | HEART RATE: 79 BPM
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
## 2025-04-30 DIAGNOSIS — S09.90XA INJURY OF HEAD, INITIAL ENCOUNTER: ICD-10-CM

## 2025-04-30 DIAGNOSIS — S00.03XA HEMATOMA OF FRONTAL SCALP, INITIAL ENCOUNTER: ICD-10-CM

## 2025-04-30 PROCEDURE — 72125 CT NECK SPINE W/O DYE: CPT

## 2025-04-30 PROCEDURE — 99283 EMERGENCY DEPT VISIT LOW MDM: CPT

## 2025-04-30 PROCEDURE — 70450 CT HEAD/BRAIN W/O DYE: CPT

## 2025-04-30 PROCEDURE — 99284 EMERGENCY DEPT VISIT MOD MDM: CPT

## 2025-04-30 NOTE — ED PROVIDER NOTES
Patient Seen in: [facility] Emergency Department      History     Chief Complaint   Patient presents with    Fall    Head Neck Injury     Stated Complaint: ground level fall, hematoma to head, no thinners    Subjective:   HPI    Patient is an 85-year-old female who slipped and fell while going to the bathroom this evening.  The history is obtained from patient who is a good historian.  She can recall the events surrounding her fall and denies any associated syncope.  No LOC after striking her head.  She arrived with a cervical collar in place.  Patient complains of pain to the left frontal region where she has swelling and bruising.  No visual disturbance.  No weakness or loss of sensation.  No difficulty speaking.  She is awake, alert, and oriented x 3.  Patient resides at [facility].  Patient is on aspirin.  Reported history of dementia.  No anticoagulation.  Patient reports she has a history of overactive bladder.  No other new urinary symptoms.  No complaints of generalized weakness.  History of Present Illness               Objective:     Past Medical History:    Anxiety state    Atherosclerosis of coronary artery    Coronary atherosclerosis    Dementia (HCC)    Dementia without behavioral disturbance (HCC)    Disorder of thyroid    Esophageal reflux    Essential hypertension    GERD without esophagitis    Hearing impairment    left side partial hearing loss    High blood pressure    High cholesterol    History of blood clots    Hyperlipidemia    Hypertensive chronic kidney disease    Hypothyroidism    Incontinence    Muscle weakness    Personal history of venous thrombosis and embolism    Pneumonia due to organism    Presence of coronary angioplasty implant and graft    Psoriasis    Renal disorder    Seizure disorder (HCC)    Visual impairment    Vitamin D deficiency              Past Surgical History:   Procedure Laterality Date    Cath bare metal stent (bms)  05/03/2011    xience V    Colonoscopy       Hysterectomy      Total knee replacement      Left knee                Social History     Socioeconomic History    Marital status:    Tobacco Use    Smoking status: Never    Smokeless tobacco: Never   Vaping Use    Vaping status: Never Used   Substance and Sexual Activity    Alcohol use: Not Currently    Drug use: Never     Social Drivers of Health     Food Insecurity: No Food Insecurity (10/21/2024)    Food Insecurity     Food Insecurity: Never true   Transportation Needs: No Transportation Needs (1/29/2025)    Transportation Needs     Lack of Transportation: No   Housing Stability: Unknown (10/21/2024)    Housing Stability     Housing Instability: Patient unable to answer                                Physical Exam     ED Triage Vitals [04/30/25 0230]   BP (!) 165/90   Pulse 79   Resp 20   Temp 97.8 °F (36.6 °C)   Temp src Oral   SpO2 98 %   O2 Device None (Room air)       Current Vitals:   Vital Signs  BP: (!) 172/91  Pulse: 79  Resp: 20  Temp: 97.8 °F (36.6 °C)  Temp src: Oral  MAP (mmHg): (!) 114    Oxygen Therapy  SpO2: 100 %  O2 Device: None (Room air)        Physical Exam  Vitals and nursing note reviewed.   Constitutional:       General: She is not in acute distress.     Appearance: Normal appearance. She is well-developed. She is not ill-appearing or toxic-appearing.   HENT:      Head: Normocephalic.        Right Ear: External ear normal.      Left Ear: External ear normal.      Mouth/Throat:      Mouth: Mucous membranes are moist.      Pharynx: Oropharynx is clear.   Eyes:      Conjunctiva/sclera: Conjunctivae normal.   Neck:      Comments: Cervical collar  Cardiovascular:      Rate and Rhythm: Normal rate and regular rhythm.      Heart sounds: Normal heart sounds.   Pulmonary:      Effort: Pulmonary effort is normal.      Breath sounds: Normal breath sounds.   Abdominal:      General: Abdomen is flat. Bowel sounds are normal. There is no distension.      Palpations: Abdomen is soft.       Tenderness: There is no abdominal tenderness.   Musculoskeletal:         General: Normal range of motion.      Right lower leg: No edema.      Left lower leg: No edema.      Comments: Stable, nontender pelvis. FROM BUE AND BLE   Skin:     General: Skin is warm.      Capillary Refill: Capillary refill takes less than 2 seconds.      Findings: No rash.   Neurological:      General: No focal deficit present.      Mental Status: She is alert and oriented to person, place, and time.      GCS: GCS eye subscore is 4. GCS verbal subscore is 5. GCS motor subscore is 6.      Sensory: No sensory deficit.      Motor: No weakness.      Comments: Clear speech, no facial droop   Psychiatric:         Mood and Affect: Mood normal.         Behavior: Behavior normal.           Physical Exam                ED Course   Labs Reviewed - No data to display       Results                                 MDM      History obtained from patient.  The patient does have a documented history of dementia but is awake, alert, and oriented x 3 and able to recall all of the events surrounding her fall this evening..     Differential diagnosis includes mechanical fall as patient is able to recall events as noted above.  She does have a history of a seizure disorder.  The patient does have some swelling and ecchymosis with hematoma to the left frontal region.  No intraoral injury.  No reported bowel or bladder incontinence.  Given that she is able to report all the events surrounding her fall as a slip and fall in the bathroom, I do not suspect that she had a seizure.  She only has tenderness located over the left frontal region.    Previous records reviewed.  Patient was noted to have a therapeutic Dilantin level on April 22, 2024.    Testing considered and ordered includes CT brain and cervical spine.  A CT cervical spine was ordered by nursing staff as patient did arrive with cervical collar in place.  Patient reports mechanical fall, slip and fall  in the bathroom, without any other complaints other than some pain and tenderness to the left frontal region.  Therefore additional labs were not ordered.    I also reviewed the official report which shows     CT BRAIN OR HEAD (CPT=70450)  Result Date: 4/30/2025  PROCEDURE:  CT BRAIN OR HEAD (69597)  COMPARISON:  [facility] , CT, CT BRAIN OR HEAD (32589), 1/28/2025, 2:59 PM.  [facility] , CT, CT BRAIN OR HEAD (57927), 10/21/2024, 1:13 PM.  INDICATIONS:  ground level fall, hematoma to head, no thinners  TECHNIQUE:  Noncontrast CT scanning is performed through the brain. Dose reduction techniques were used. Dose information is transmitted to the ACR (American College of Radiology) NRDR (National Radiology Data Registry) which includes the Dose Index Registry.  PATIENT STATED HISTORY: (As transcribed by Technologist)  ground level fall, hematoma to head, no thinners     FINDINGS:  There is cerebral atrophy with symmetric prominence of the ventricles. There are patchy areas of low attenuation in the periventricular and deep white matter which are nonspecific but most consistent with small vessel ischemic changes. There is no evidence of hemorrhage, mass, midline shift, or extra-axial fluid collection.  The visualized paranasal sinuses show no significant sinus disease.. No evidence of depressed skull fracture.  Scalp hematoma along the left frontal convexity.            CONCLUSION: 1. No acute intracranial findings 2. Cerebral atrophy with chronic microvascular ischemic changes. 3. Scalp hematoma along the left frontal convexity. 4. Preliminary report was provided by [facility] at time of examination.  Final report confirms findings on preliminary report without discrepancy.     LOCATION:  [id number]   Dictated by (CST): Zhen Redmond MD on 4/30/2025 at 7:12 AM     Finalized by (CST): Zhen Redmond MD on 4/30/2025 at 7:13 AM       CT SPINE CERVICAL (CPT=72125)  Result Date: 4/30/2025  PROCEDURE:  CT SPINE CERVICAL  (CPT=72125)  COMPARISON:  [facility] , CT, CT SPINE CERVICAL (CPT=72125), 2/19/2024, 2:02 PM.  [facility] , CT, CT SPINE CERVICAL (CPT=72125), 10/19/2022, 3:00 PM.  INDICATIONS:  ground level fall, hematoma to head, no thinners, pain  TECHNIQUE:  Noncontrast CT scanning of the cervical spine is performed from the skull base through C7.  Multiplanar reconstructions are generated.  Dose reduction techniques were used. Dose information is transmitted to the ACR (American College of Radiology) NRDR (National Radiology Data Registry) which includes the Dose Index Registry.  PATIENT STATED HISTORY: (As transcribed by Technologist)  ground level fall, hematoma to head, no thinners     FINDINGS:  The cervical spine shows no evidence of fracture.  The cervical vertebral alignment demonstrates no subluxation. Ligamentous injury cannot be excluded on CT scan. There are normal cervical basilar relationships. The odontoid process is intact. No destructive osseous lesion is identified. The pre vertebral and tala vertebral soft tissues are normal.  Mild degenerative changes with uncovertebral and facet hypertrophy.  Scarring within the right lung apex.            CONCLUSION:  1. No acute fractures. 2. Mild degenerative changes. 3. Preliminary report was provided by [facility] at time of examination.  Final report confirms findings on preliminary report without discrepancy.    LOCATION:  [id number]   Dictated by (CST): Zhen Redmond MD on 4/30/2025 at 7:10 AM     Finalized by (CST): Zhen Redmond MD on 4/30/2025 at 7:11 AM           Interventions in care included ice.  Cervical collar removed after review of test results.    Discussed results with patient as well as plan for discharge.  Return to ED if symptoms worsen or persist, new symptoms develop.  Otherwise close outpatient follow-up with primary care provider reevaluation.  Patient with discharge plan.  No additional complaints.              Medical Decision  Making      Disposition and Plan     Clinical Impression:  1. Fall, initial encounter    2. Injury of head, initial encounter    3. Hematoma of frontal scalp, initial encounter         Disposition:  Discharge  4/30/2025  4:16 am    Follow-up:  Alondra Burgess MD  [address] [place] [id number]  [phone]    Schedule an appointment as soon as possible for a visit in 2 day(s)      [facility] Emergency Department  [address] [facility] [address]  [phone]  Follow up  IF SYMPTOMS WORSEN, PERSIST, OR NEW SYMPTOMS DEVEL          Medications Prescribed:  Discharge Medication List as of 4/30/2025  4:19 AM          Supplementary Documentation:

## 2025-04-30 NOTE — ED QUICK NOTES
Patients cross body purse found in room by day shift. [facility] called and will transport it back to [facility] for patient. RN called and [facility] and notified staff it was found and will be transported back to the assisted living.

## 2025-04-30 NOTE — ED INITIAL ASSESSMENT (HPI)
Pt reports from [facility] after unwitnessed fall in bathroom. Pt slipped, no LOC, hit head. Hematoma to L forehead. Hx of dementia, pt a/o x4. Takes ASA.

## 2025-05-07 ENCOUNTER — LAB ENCOUNTER (OUTPATIENT)
Dept: LAB | Age: 85
End: 2025-05-07
Attending: INTERNAL MEDICINE
Payer: MEDICARE

## 2025-05-12 ENCOUNTER — APPOINTMENT (OUTPATIENT)
Dept: GENERAL RADIOLOGY | Facility: HOSPITAL | Age: 85
End: 2025-05-12
Attending: EMERGENCY MEDICINE
Payer: MEDICARE

## 2025-05-12 ENCOUNTER — HOSPITAL ENCOUNTER (EMERGENCY)
Facility: HOSPITAL | Age: 85
Discharge: HOME | End: 2025-05-12
Attending: EMERGENCY MEDICINE
Payer: MEDICARE

## 2025-05-12 ENCOUNTER — APPOINTMENT (OUTPATIENT)
Dept: CT IMAGING | Facility: HOSPITAL | Age: 85
End: 2025-05-12
Attending: EMERGENCY MEDICINE
Payer: MEDICARE

## 2025-05-12 VITALS
SYSTOLIC BLOOD PRESSURE: 139 MMHG | HEART RATE: 73 BPM | HEIGHT: 62 IN | DIASTOLIC BLOOD PRESSURE: 96 MMHG | BODY MASS INDEX: 28.34 KG/M2 | OXYGEN SATURATION: 100 % | WEIGHT: 154 LBS | TEMPERATURE: 99 F | RESPIRATION RATE: 8 BRPM

## 2025-05-12 DIAGNOSIS — S09.90XA INJURY OF HEAD, INITIAL ENCOUNTER: ICD-10-CM

## 2025-05-12 DIAGNOSIS — S00.83XA CONTUSION OF FACE, INITIAL ENCOUNTER: ICD-10-CM
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
## 2025-05-12 DIAGNOSIS — S80.12XA CONTUSION OF LEFT LOWER EXTREMITY, INITIAL ENCOUNTER: ICD-10-CM

## 2025-05-12 PROCEDURE — 73590 X-RAY EXAM OF LOWER LEG: CPT | Performed by: EMERGENCY MEDICINE

## 2025-05-12 PROCEDURE — 99284 EMERGENCY DEPT VISIT MOD MDM: CPT

## 2025-05-12 PROCEDURE — 70450 CT HEAD/BRAIN W/O DYE: CPT | Performed by: EMERGENCY MEDICINE

## 2025-05-12 RX ORDER — ACETAMINOPHEN 500 MG
1000 TABLET ORAL ONCE
Status: COMPLETED | OUTPATIENT
Start: 2025-05-12 | End: 2025-05-12

## 2025-05-12 NOTE — ED PROVIDER NOTES
Patient Seen in: [facility] Emergency Department      History     Chief Complaint   Patient presents with    Trauma     Stated Complaint: fall x 2 today, recent falls    Subjective:   Patient is 85-year-old female who presents emergency room for head injury.  Patient had a fall tonight.  Patient reports she was reaching for Tylenol lost her balance and fell backwards.  Patient struck the back of her head she has extensive bruising that appears old on periorbital regions bilaterally.  She had a fall several days ago and is on aspirin.  Patient has no focal deficits on exam she also fell earlier today because of her left leg she has a hematoma on the left calf as well.    The history is provided by the patient and the EMS personnel.         History of Present Illness               Objective:     Past Medical History:    Anxiety state    Atherosclerosis of coronary artery    Coronary atherosclerosis    Dementia (HCC)    Dementia without behavioral disturbance (HCC)    Disorder of thyroid    Esophageal reflux    Essential hypertension    GERD without esophagitis    Hearing impairment    left side partial hearing loss    High blood pressure    High cholesterol    History of blood clots    Hyperlipidemia    Hypertensive chronic kidney disease    Hypothyroidism    Incontinence    Muscle weakness    Personal history of venous thrombosis and embolism    Pneumonia due to organism    Presence of coronary angioplasty implant and graft    Psoriasis    Renal disorder    Seizure disorder (HCC)    Visual impairment    Vitamin D deficiency              Past Surgical History:   Procedure Laterality Date    Cath bare metal stent (bms)  05/03/2011    xience V    Colonoscopy      Hysterectomy      Total knee replacement      Left knee                Social History     Socioeconomic History    Marital status:    Tobacco Use    Smoking status: Never    Smokeless tobacco: Never   Vaping Use    Vaping status: Never Used    Substance and Sexual Activity    Alcohol use: Not Currently    Drug use: Never     Social Drivers of Health     Food Insecurity: No Food Insecurity (10/21/2024)    Food Insecurity     Food Insecurity: Never true   Transportation Needs: No Transportation Needs (1/29/2025)    Transportation Needs     Lack of Transportation: No   Housing Stability: Unknown (10/21/2024)    Housing Stability     Housing Instability: Patient unable to answer                                Physical Exam     ED Triage Vitals [05/12/25 0041]   BP (!) 139/96   Pulse 73   Resp 16   Temp 98.6 °F (37 °C)   Temp src Temporal   SpO2 100 %   O2 Device None (Room air)       Current Vitals:   Vital Signs  BP: (!) 139/96  Pulse: 73  Resp: (!) 8  Temp: 98.6 °F (37 °C)  Temp src: Temporal  MAP (mmHg): (!) 108    Oxygen Therapy  SpO2: 100 %  O2 Device: None (Room air)        Physical Exam  Vitals and nursing note reviewed.   Constitutional:       General: She is not in acute distress.     Appearance: Normal appearance. She is normal weight. She is not toxic-appearing.   HENT:      Head:      Comments: Extensive bruising appears old on periorbital regions bilaterally.  Patient has a small scalp hematoma on scalp.  Ice in place     Mouth/Throat:      Mouth: Mucous membranes are moist.   Eyes:      Extraocular Movements: Extraocular movements intact.      Pupils: Pupils are equal, round, and reactive to light.   Cardiovascular:      Rate and Rhythm: Normal rate.      Pulses: Normal pulses.      Heart sounds: Normal heart sounds.   Pulmonary:      Effort: Pulmonary effort is normal.      Breath sounds: Normal breath sounds.   Abdominal:      General: Bowel sounds are normal. There is distension.      Palpations: Abdomen is soft.      Tenderness: There is no abdominal tenderness.   Musculoskeletal:         General: Swelling present. Normal range of motion.      Cervical back: Neck supple. No tenderness.   Skin:     General: Skin is warm.      Capillary  Refill: Capillary refill takes less than 2 seconds.      Findings: Bruising present.      Comments: Bruising noted on left shin along with periorbital region on both sides of face bruising on chin appears new but the face bruising is several days old.   Neurological:      General: No focal deficit present.      Mental Status: She is alert and oriented to person, place, and time.      Cranial Nerves: No cranial nerve deficit.      Motor: No weakness.   Psychiatric:         Mood and Affect: Mood normal.         Behavior: Behavior normal.           Physical Exam                ED Course   Labs Reviewed - No data to display       Results     X-ray of left tib-fib shows no priors no acute fracture or malalignment cemented right knee arthroplasty with patellar resurfacing at least 1 suspected osteochondral body in the anterior infrapatellar joint space.  Linear calcifications along the medial tibia possibly ligamentous.  Diffuse edema in the leg.  Normal joint ankle joint.  Additional radiographics of the left knee may be indicated.      CT scan shows comparison from 4/30/2025 shows small left frontal scalp hematoma no acute intracranial hemorrhage midline shift or mass effect mild bihemispheric volume loss.  Periventricular white matter changes compatible with chronic microvascular ischemic disease.  No acute fracture patent paranasal sinuses and mastoid air cells.               MDM      Social -negative tobacco,  occasional  etoh, negative drugs  Family History- noncontributory  Past Medical History-  seizure, dementia, cad, psoriasis, anxieyt, hypothyroidism, htn , gerd    Differential diagnosis before testing included head injury, intracranial hemorrhage, fracture, leg contusion, fracture, hematoma    Co-morbidities that add to the complexity of management include: Patient is on aspirin    Testing ordered during this visit included CT scan of the brain, x-ray of the tib-fib    Radiographic images  I personally  reviewed the radiographs and my individual interpretation shows x-ray of the left tib-fib shows no acute fracture, CT scan of the brain shows no acute intracranial hemorrhage  I also reviewed the official reports that showed  X-ray of left tib-fib shows no priors no acute fracture or malalignment cemented right knee arthroplasty with patellar resurfacing at least 1 suspected osteochondral body in the anterior infrapatellar joint space.  Linear calcifications along the medial tibia possibly ligamentous.  Diffuse edema in the leg.  Normal joint ankle joint.  Additional radiographics of the left knee may be indicated.      CT scan shows comparison from 4/30/2025 shows small left frontal scalp hematoma no acute intracranial hemorrhage midline shift or mass effect mild bihemispheric volume loss.  Periventricular white matter changes compatible with chronic microvascular ischemic disease.  No acute fracture patent paranasal sinuses and mastoid air cells.    External chart review showed review of Care Everywhere in epic system shows no related comorbidities to current presentation patient does have a history of seizure activity she denies seizure activity today    History obtained by an independent source included from patient, EMS    Discussion of management with patient    Social determinants of health that affect care include not applicable      Medications Provided: Ice, Tylenol    Course of Events during Emergency Room Visit include 85-year-old female presents emergency room for evaluation of head injury.  She also has a contusion to her left tib-fib will get x-ray of that along with CT scan of the brain.  Patient uses a walker at baseline she is to continue to do so.  Patient did not seek any care for the recent head injury she had a few days ago.      Patient informed of CT findings.  Patient will be discharged home.  We asked if she wanted us to contact her daughter she declined though saying she did not want to  wake her up.    Disposition:        Discharge  I have discussed with the patient the results of test, differential diagnosis, treatment plan, warning signs and symptoms which should prompt immediate return.  They expressed understanding of these instructions and agrees to the following plan provided.  They were given written discharge instructions and agrees to return for any concerns and voiced understanding and all questions were answered.           Medical Decision Making      Disposition and Plan     Clinical Impression:  1. Fall, initial encounter    2. Injury of head, initial encounter    3. Contusion of left lower extremity, initial encounter    4. Contusion of face, initial encounter         Disposition:  Discharge  5/12/2025  1:42 am    Follow-up:  Alondra Burgess MD  [address] [place] [id number]  [phone]    Schedule an appointment as soon as possible for a visit            Medications Prescribed:  Current Discharge Medication List          Supplementary Documentation:

## 2025-05-12 NOTE — ED QUICK NOTES
Daughter at bedside, daughter to drive patient home. [facility] notified of transport change.

## 2025-05-12 NOTE — ED INITIAL ASSESSMENT (HPI)
Arrives via EMS from assisted living with fall tonight , pain to left side of head. Pt states she was picking up a pill off the ground, felt dizzy and fell over to left side on hard floor. Denies BLood thinners, Denies LOC. Pt had fall earlier in day at daughters house, pain to LLE. Recent fall with healing bruises to face on arrival

## 2025-05-14 ENCOUNTER — HOSPITAL ENCOUNTER (EMERGENCY)
Facility: HOSPITAL | Age: 85
Discharge: HOME | End: 2025-05-14
Attending: EMERGENCY MEDICINE
Payer: MEDICARE

## 2025-05-14 ENCOUNTER — APPOINTMENT (OUTPATIENT)
Dept: CT IMAGING | Facility: HOSPITAL | Age: 85
End: 2025-05-14
Attending: EMERGENCY MEDICINE
Payer: MEDICARE

## 2025-05-14 VITALS
BODY MASS INDEX: 29.44 KG/M2 | HEART RATE: 79 BPM | OXYGEN SATURATION: 98 % | WEIGHT: 160 LBS | SYSTOLIC BLOOD PRESSURE: 157 MMHG | RESPIRATION RATE: 18 BRPM | TEMPERATURE: 98 F | HEIGHT: 62 IN | DIASTOLIC BLOOD PRESSURE: 99 MMHG

## 2025-05-14 DIAGNOSIS — S00.83XA FOREHEAD CONTUSION, INITIAL ENCOUNTER: ICD-10-CM
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
## 2025-05-14 DIAGNOSIS — R29.6 FREQUENT FALLS: ICD-10-CM

## 2025-05-14 LAB
ALBUMIN SERPL-MCNC: 4.7 G/DL (ref 3.2–4.8)
ALBUMIN/GLOB SERPL: 1.9 {RATIO} (ref 1–2)
ALP LIVER SERPL-CCNC: 101 U/L (ref 55–142)
ALT SERPL-CCNC: 18 U/L (ref 10–49)
ANION GAP SERPL CALC-SCNC: 10 MMOL/L (ref 0–18)
AST SERPL-CCNC: 23 U/L (ref ?–34)
BASOPHILS # BLD AUTO: 0.09 X10(3) UL (ref 0–0.2)
BASOPHILS NFR BLD AUTO: 0.8 %
BILIRUB SERPL-MCNC: 0.4 MG/DL (ref 0.2–1.1)
BUN BLD-MCNC: 7 MG/DL (ref 9–23)
CALCIUM BLD-MCNC: 9.5 MG/DL (ref 8.7–10.6)
CHLORIDE SERPL-SCNC: 104 MMOL/L (ref 98–112)
CO2 SERPL-SCNC: 22 MMOL/L (ref 21–32)
CREAT BLD-MCNC: 0.82 MG/DL (ref 0.55–1.02)
EGFRCR SERPLBLD CKD-EPI 2021: 71 ML/MIN/1.73M2 (ref 60–?)
EOSINOPHIL # BLD AUTO: 0.42 X10(3) UL (ref 0–0.7)
EOSINOPHIL NFR BLD AUTO: 3.5 %
ERYTHROCYTE [DISTWIDTH] IN BLOOD BY AUTOMATED COUNT: 12.7 %
GLOBULIN PLAS-MCNC: 2.5 G/DL (ref 2–3.5)
GLUCOSE BLD-MCNC: 101 MG/DL (ref 70–99)
HCT VFR BLD AUTO: 36.2 % (ref 35–48)
HGB BLD-MCNC: 12.7 G/DL (ref 12–16)
IMM GRANULOCYTES # BLD AUTO: 0.06 X10(3) UL (ref 0–1)
IMM GRANULOCYTES NFR BLD: 0.5 %
LYMPHOCYTES # BLD AUTO: 1.86 X10(3) UL (ref 1–4)
LYMPHOCYTES NFR BLD AUTO: 15.5 %
MCH RBC QN AUTO: 34 PG (ref 26–34)
MCHC RBC AUTO-ENTMCNC: 35.1 G/DL (ref 31–37)
MCV RBC AUTO: 96.8 FL (ref 80–100)
MONOCYTES # BLD AUTO: 1.04 X10(3) UL (ref 0.1–1)
MONOCYTES NFR BLD AUTO: 8.7 %
NEUTROPHILS # BLD AUTO: 8.51 X10 (3) UL (ref 1.5–7.7)
NEUTROPHILS # BLD AUTO: 8.51 X10(3) UL (ref 1.5–7.7)
NEUTROPHILS NFR BLD AUTO: 71 %
OSMOLALITY SERPL CALC.SUM OF ELEC: 280 MOSM/KG (ref 275–295)
PLATELET # BLD AUTO: 203 10(3)UL (ref 150–450)
POTASSIUM SERPL-SCNC: 3.7 MMOL/L (ref 3.5–5.1)
PROT SERPL-MCNC: 7.2 G/DL (ref 5.7–8.2)
RBC # BLD AUTO: 3.74 X10(6)UL (ref 3.8–5.3)
SODIUM SERPL-SCNC: 136 MMOL/L (ref 136–145)
WBC # BLD AUTO: 12 X10(3) UL (ref 4–11)

## 2025-05-14 PROCEDURE — 36415 COLL VENOUS BLD VENIPUNCTURE: CPT

## 2025-05-14 PROCEDURE — 80053 COMPREHEN METABOLIC PANEL: CPT | Performed by: EMERGENCY MEDICINE

## 2025-05-14 PROCEDURE — 99285 EMERGENCY DEPT VISIT HI MDM: CPT

## 2025-05-14 PROCEDURE — 85025 COMPLETE CBC W/AUTO DIFF WBC: CPT | Performed by: EMERGENCY MEDICINE

## 2025-05-14 PROCEDURE — 99284 EMERGENCY DEPT VISIT MOD MDM: CPT

## 2025-05-14 PROCEDURE — 70450 CT HEAD/BRAIN W/O DYE: CPT | Performed by: EMERGENCY MEDICINE

## 2025-05-14 NOTE — ED INITIAL ASSESSMENT (HPI)
Per ems, patient sitting on toilet pta, attempted to reach for something, lost balance and fell forward. Swelling to L forehead. Patient has other bruises on face from other fall. Denies LOC

## 2025-05-14 NOTE — ED PROVIDER NOTES
Patient Seen in: [facility] Emergency Department      History     Chief Complaint   Patient presents with    Fall     Stated Complaint:     Subjective:   HPI  History of Present Illness            Patient is an 85-year-old female who was brought in by ambulance after she was sitting on the toilet had dropped something attempted to reach for it lost balance and fell forward hitting her head on the wall.  She has some swelling to the left forehead.  She denies any LOC.  She has some bruises on her face from other fall.  Denies any severe headache just hurts where the bump is.  Denies any hip or any other pain.  Denies any chest pain shortness of breath      Objective:     Past Medical History:    Anxiety state    Atherosclerosis of coronary artery    Coronary atherosclerosis    Dementia (HCC)    Dementia without behavioral disturbance (HCC)    Disorder of thyroid    Esophageal reflux    Essential hypertension    GERD without esophagitis    Hearing impairment    left side partial hearing loss    High blood pressure    High cholesterol    History of blood clots    Hyperlipidemia    Hypertensive chronic kidney disease    Hypothyroidism    Incontinence    Muscle weakness    Personal history of venous thrombosis and embolism    Pneumonia due to organism    Presence of coronary angioplasty implant and graft    Psoriasis    Renal disorder    Seizure disorder (HCC)    Visual impairment    Vitamin D deficiency              Past Surgical History:   Procedure Laterality Date    Cath bare metal stent (bms)  05/03/2011    xience V    Colonoscopy      Hysterectomy      Total knee replacement      Left knee                No pertinent social history.                              Physical Exam     ED Triage Vitals   BP 05/14/25 0350 (!) 157/99   Pulse 05/14/25 0350 83   Resp 05/14/25 0350 18   Temp 05/14/25 0352 97.5 °F (36.4 °C)   Temp src --    SpO2 05/14/25 0350 95 %   O2 Device --        Current Vitals:   Vital Signs  BP:  (!) 157/99  Pulse: 79  Resp: 18  Temp: 97.5 °F (36.4 °C)  MAP (mmHg): (!) 118    Oxygen Therapy  SpO2: 98 %          Physical Exam    Vital signs reviewed  General appearance: Patient is alert and in no acute distress  HEENT: Pupils equal react to light extraocular muscles intact no scleral icterus, mucous membranes are moist, there is no erythema or exudate in the posterior pharynx, patient has ecchymosis under both eyes.  There is a hematoma over the middle of the forehead.  Tender to touch  Neck: Supple no JVD no lymphadenopathy no meningismus no carotid bruit  CV: Regular rate and rhythm no murmur rub  Respiratory: Clear to auscultation bilaterally no crackles no wheezes no accessory muscle use  Abdomen: Soft nontender nondistended, no rebound no guarding  no hepatosplenomegaly bowel sounds are present , no pulsatile mass  Extremities: No clubbing cyanosis or edema 2+ distal pulses.  Neuro: Cranial nerves II through XII intact with no gross focal sensory or motor abnormality.            ED Course     Labs Reviewed   COMP METABOLIC PANEL (14) - Abnormal; Notable for the following components:       Result Value    Glucose 101 (*)     BUN 7 (*)     All other components within normal limits   CBC WITH DIFFERENTIAL WITH PLATELET - Abnormal; Notable for the following components:    WBC 12.0 (*)     RBC 3.74 (*)     Neutrophil Absolute Prelim 8.51 (*)     Neutrophil Absolute 8.51 (*)     Monocyte Absolute 1.04 (*)     All other components within normal limits        CT head without contrast    COMPARISON: 5/12/2025    IMPRESSION:    No acute intracranial hemorrhage, mass effect, midline shift, or hydrocephalus.    No loss of gray-white matter differentiation.  No acute calvarial fracture.    Left anterior frontal scalp swelling/hematoma.  Results            Patient was evaluated the emergency department had a CT scan along with baseline labs.  White count was slightly elevated.  Patient really did not want to come here  but they called the ambulance after she fell just to be checked out.  She is not on any blood thinners.  She will ice the affected area make sure she is using her walker and try to use the bedside commode                    MDM      Differential diagnosis reflecting the complexity of care include: Fall from toilet, head contusion, head bleed    Comorbidities that add complexity to management include: Frequent falls    External chart review was done and was noted: Patient's note from 5/12/2025 was reviewed and patient had fell and had a CT also has had hit her head.    My independent interpretation of studies of: CT brain showed no bleed no fracture        Social determinants of health that affect care: Patient needs to make sure she is doing extreme fall precautions as she has been falling frequently    Shared decision making was done by myself and patient.  She will be discharged back to her nursing facility.  Return if any worsening symptoms.  Ice the affected area            Medical Decision Making      Disposition and Plan     Clinical Impression:  1. Fall from toilet seat, initial encounter    2. Forehead contusion, initial encounter    3. Frequent falls         Disposition:  Discharge  5/14/2025  4:58 am    Follow-up:  Alondra Burgess MD  [address] [place] [id number]  [phone]    Follow up            Medications Prescribed:  Discharge Medication List as of 5/14/2025  5:22 AM                Supplementary Documentation: Patient was screened and evaluated during this visit.  As the treating physician attending to the patient, I determined within reasonable clinical confidence and prior to discharge, that an emergency medical condition was not or was no longer present.  There was no indication for further evaluation, treatment, or admission on an emergency basis.  Comprehensive verbal and written discharge and follow-up instructions were provided to help prevent relapse or worsening.  Patient  was instructed to follow-up with primary care provider for further evaluation treatment, return immediately to ER for worsening, concerning, new, or changing/persisting symptoms.  I discussed the case with the patient and they had no questions, complaints, or concerns.  Patient was comfortable going home.      Dictation Disclaimer Note:   To increase efficiency this document may have been prepared using voice recognition technology. Every effort has been made to correct any errors made during preparation of this note. However, if a word or phrase is confusing, or does not make sense, this is likely due to a recognition error within the program which was not discovered during editing. Please do not hesitate to contact to address any significant errors.    Note to Patient:   The 21st Century Cures Act makes medical notes like these available to patients in the interest of transparency. Please be advised this is a medical document. Medical documents are intended to carry relevant information, facts as evident, and the clinical opinion of the practitioner. The medical note is intended as peer to peer communication and may appear blunt or direct. It is written in medical language and may contain abbreviations or verbiage that are unfamiliar.

## 2025-05-15 ENCOUNTER — LAB ENCOUNTER (OUTPATIENT)
Dept: LAB | Age: 85
End: 2025-05-15
Attending: INTERNAL MEDICINE
Payer: MEDICARE

## 2025-05-22 ENCOUNTER — LAB ENCOUNTER (OUTPATIENT)
Dept: LAB | Age: 85
End: 2025-05-22
Attending: INTERNAL MEDICINE
Payer: MEDICARE

## 2025-06-05 ENCOUNTER — LAB ENCOUNTER (OUTPATIENT)
Dept: LAB | Age: 85
End: 2025-06-05
Attending: INTERNAL MEDICINE
Payer: MEDICARE

## 2025-07-08 ENCOUNTER — LAB ENCOUNTER (OUTPATIENT)
Dept: LAB | Age: 85
End: 2025-07-08
Attending: INTERNAL MEDICINE
Payer: MEDICARE

## 2025-07-22 ENCOUNTER — HOSPITAL ENCOUNTER (INPATIENT)
Facility: HOSPITAL | Age: 85
LOS: 2 days | Discharge: HOME HEALTH | End: 2025-07-24
Attending: EMERGENCY MEDICINE | Admitting: INTERNAL MEDICINE

## 2025-07-22 ENCOUNTER — APPOINTMENT (OUTPATIENT)
Dept: GENERAL RADIOLOGY | Facility: HOSPITAL | Age: 85
End: 2025-07-22
Attending: EMERGENCY MEDICINE

## 2025-07-22 DIAGNOSIS — N17.9 AKI (ACUTE KIDNEY INJURY): Primary | ICD-10-CM

## 2025-07-22 DIAGNOSIS — E87.1 HYPONATREMIA: ICD-10-CM

## 2025-07-22 DIAGNOSIS — E87.8 HYPOCHLOREMIA: ICD-10-CM

## 2025-07-22 DIAGNOSIS — N39.0 URINARY TRACT INFECTION WITH HEMATURIA, SITE UNSPECIFIED: ICD-10-CM

## 2025-07-22 DIAGNOSIS — R31.9 URINARY TRACT INFECTION WITH HEMATURIA, SITE UNSPECIFIED: ICD-10-CM

## 2025-07-22 LAB
ALBUMIN SERPL-MCNC: 4.2 G/DL (ref 3.2–4.8)
ALBUMIN/GLOB SERPL: 1.4 (ref 1–2)
ALP LIVER SERPL-CCNC: 92 U/L (ref 55–142)
ALT SERPL-CCNC: 20 U/L (ref 10–49)
ANION GAP SERPL CALC-SCNC: 12 MMOL/L (ref 0–18)
AST SERPL-CCNC: 34 U/L (ref ?–34)
BASOPHILS # BLD AUTO: 0.06 X10(3) UL (ref 0–0.2)
BASOPHILS NFR BLD AUTO: 0.4 %
BILIRUB SERPL-MCNC: 0.4 MG/DL (ref 0.2–1.1)
BUN BLD-MCNC: 19 MG/DL (ref 9–23)
C DIFF TOX B STL QL: NEGATIVE
CALCIUM BLD-MCNC: 9.5 MG/DL (ref 8.7–10.6)
CHLORIDE SERPL-SCNC: 93 MMOL/L (ref 98–112)
CO2 SERPL-SCNC: 21 MMOL/L (ref 21–32)
CREAT BLD-MCNC: 1.87 MG/DL (ref 0.55–1.02)
EGFRCR SERPLBLD CKD-EPI 2021: 26 ML/MIN/1.73M2 (ref 60–?)
EOSINOPHIL # BLD AUTO: 0.07 X10(3) UL (ref 0–0.7)
EOSINOPHIL NFR BLD AUTO: 0.5 %
ERYTHROCYTE [DISTWIDTH] IN BLOOD BY AUTOMATED COUNT: 11.8 %
GLOBULIN PLAS-MCNC: 3.1 G/DL (ref 2–3.5)
GLUCOSE BLD-MCNC: 120 MG/DL (ref 70–99)
HCT VFR BLD AUTO: 33.4 % (ref 35–48)
HGB BLD-MCNC: 12.5 G/DL (ref 12–16)
IMM GRANULOCYTES # BLD AUTO: 0.07 X10(3) UL (ref 0–1)
IMM GRANULOCYTES NFR BLD: 0.5 %
LACTATE SERPL-SCNC: 0.6 MMOL/L (ref 0.5–2)
LYMPHOCYTES # BLD AUTO: 0.85 X10(3) UL (ref 1–4)
LYMPHOCYTES NFR BLD AUTO: 6.3 %
MCH RBC QN AUTO: 33.3 PG (ref 26–34)
MCHC RBC AUTO-ENTMCNC: 37.4 G/DL (ref 31–37)
MCV RBC AUTO: 89.1 FL (ref 80–100)
MONOCYTES # BLD AUTO: 1.18 X10(3) UL (ref 0.1–1)
MONOCYTES NFR BLD AUTO: 8.8 %
NEUTROPHILS # BLD AUTO: 11.17 X10 (3) UL (ref 1.5–7.7)
NEUTROPHILS # BLD AUTO: 11.17 X10(3) UL (ref 1.5–7.7)
NEUTROPHILS NFR BLD AUTO: 83.5 %
OSMOLALITY SERPL CALC.SUM OF ELEC: 265 MOSM/KG (ref 275–295)
PLATELET # BLD AUTO: 269 10(3)UL (ref 150–450)
POTASSIUM SERPL-SCNC: 3.8 MMOL/L (ref 3.5–5.1)
PROT SERPL-MCNC: 7.3 G/DL (ref 5.7–8.2)
RBC # BLD AUTO: 3.75 X10(6)UL (ref 3.8–5.3)
RBC #/AREA URNS AUTO: >10 /HPF
SODIUM SERPL-SCNC: 126 MMOL/L (ref 136–145)
SP GR UR REFRACTOMETRY: 1.01 (ref 1–1.03)
T4 FREE SERPL-MCNC: 1.9 NG/DL (ref 0.8–1.7)
TSI SER-ACNC: 0.21 UIU/ML (ref 0.55–4.78)
WBC # BLD AUTO: 13.4 X10(3) UL (ref 4–11)
WBC #/AREA URNS AUTO: >50 /HPF
WBC CLUMPS UR QL AUTO: PRESENT /HPF
YEAST UR QL: PRESENT /HPF

## 2025-07-22 PROCEDURE — 73560 X-RAY EXAM OF KNEE 1 OR 2: CPT | Performed by: EMERGENCY MEDICINE

## 2025-07-22 PROCEDURE — 96365 THER/PROPH/DIAG IV INF INIT: CPT

## 2025-07-22 PROCEDURE — 99285 EMERGENCY DEPT VISIT HI MDM: CPT

## 2025-07-22 PROCEDURE — 87493 C DIFF AMPLIFIED PROBE: CPT | Performed by: INTERNAL MEDICINE

## 2025-07-22 PROCEDURE — 87040 BLOOD CULTURE FOR BACTERIA: CPT | Performed by: EMERGENCY MEDICINE

## 2025-07-22 PROCEDURE — 87077 CULTURE AEROBIC IDENTIFY: CPT | Performed by: EMERGENCY MEDICINE

## 2025-07-22 PROCEDURE — 84443 ASSAY THYROID STIM HORMONE: CPT | Performed by: EMERGENCY MEDICINE

## 2025-07-22 PROCEDURE — 87086 URINE CULTURE/COLONY COUNT: CPT | Performed by: EMERGENCY MEDICINE

## 2025-07-22 PROCEDURE — 80053 COMPREHEN METABOLIC PANEL: CPT | Performed by: EMERGENCY MEDICINE

## 2025-07-22 PROCEDURE — 96361 HYDRATE IV INFUSION ADD-ON: CPT

## 2025-07-22 PROCEDURE — 84439 ASSAY OF FREE THYROXINE: CPT | Performed by: EMERGENCY MEDICINE

## 2025-07-22 PROCEDURE — 81001 URINALYSIS AUTO W/SCOPE: CPT | Performed by: EMERGENCY MEDICINE

## 2025-07-22 PROCEDURE — 85025 COMPLETE CBC W/AUTO DIFF WBC: CPT | Performed by: EMERGENCY MEDICINE

## 2025-07-22 PROCEDURE — 83605 ASSAY OF LACTIC ACID: CPT | Performed by: EMERGENCY MEDICINE

## 2025-07-22 RX ORDER — PHENYTOIN SODIUM 100 MG/1
100 CAPSULE, EXTENDED RELEASE ORAL DAILY
Status: CANCELLED | OUTPATIENT
Start: 2025-07-22

## 2025-07-22 RX ORDER — POLYVINYL ALCOHOL 14 MG/ML
1 SOLUTION/ DROPS OPHTHALMIC 4 TIMES DAILY PRN
COMMUNITY

## 2025-07-22 RX ORDER — PHENYTOIN 50 MG/1
50 TABLET, CHEWABLE ORAL DAILY
Status: DISCONTINUED | OUTPATIENT
Start: 2025-07-22 | End: 2025-07-23

## 2025-07-22 RX ORDER — FAMOTIDINE 20 MG/1
20 TABLET, FILM COATED ORAL DAILY
Status: CANCELLED | OUTPATIENT
Start: 2025-07-22

## 2025-07-22 RX ORDER — BETAMETHASONE DIPROPIONATE 0.5 MG/G
CREAM TOPICAL 2 TIMES DAILY
COMMUNITY

## 2025-07-22 RX ORDER — AMOXICILLIN 500 MG
CAPSULE ORAL
Status: ON HOLD | COMMUNITY
End: 2025-07-22

## 2025-07-22 RX ORDER — SODIUM CHLORIDE 9 MG/ML
INJECTION, SOLUTION INTRAVENOUS CONTINUOUS
Status: ACTIVE | OUTPATIENT
Start: 2025-07-22 | End: 2025-07-22

## 2025-07-22 RX ORDER — CLONAZEPAM 0.5 MG/1
1 TABLET ORAL NIGHTLY
Status: CANCELLED | OUTPATIENT
Start: 2025-07-22

## 2025-07-22 RX ORDER — LACTULOSE 10 G/15ML
10 SOLUTION ORAL DAILY PRN
Status: CANCELLED | OUTPATIENT
Start: 2025-07-22

## 2025-07-22 RX ORDER — ACETAMINOPHEN 500 MG
500 TABLET ORAL EVERY 6 HOURS PRN
COMMUNITY

## 2025-07-22 RX ORDER — POLYETHYLENE GLYCOL 3350 17 G/17G
17 POWDER, FOR SOLUTION ORAL DAILY PRN
Status: ON HOLD | COMMUNITY
End: 2025-07-22

## 2025-07-22 RX ORDER — HEPARIN SODIUM 5000 [USP'U]/ML
5000 INJECTION, SOLUTION INTRAVENOUS; SUBCUTANEOUS EVERY 12 HOURS SCHEDULED
Status: DISCONTINUED | OUTPATIENT
Start: 2025-07-22 | End: 2025-07-24

## 2025-07-22 RX ORDER — METOPROLOL SUCCINATE 25 MG/1
25 TABLET, EXTENDED RELEASE ORAL DAILY
COMMUNITY
End: 2025-08-07

## 2025-07-22 RX ORDER — ACETAMINOPHEN 500 MG
500 TABLET ORAL EVERY 4 HOURS PRN
Status: DISCONTINUED | OUTPATIENT
Start: 2025-07-22 | End: 2025-07-23

## 2025-07-22 RX ORDER — PHENYTOIN SODIUM 100 MG/1
200 CAPSULE, EXTENDED RELEASE ORAL EVERY EVENING
Status: CANCELLED | OUTPATIENT
Start: 2025-07-22

## 2025-07-22 RX ORDER — LACOSAMIDE 100 MG/1
100 TABLET ORAL 2 TIMES DAILY
Status: CANCELLED | OUTPATIENT
Start: 2025-07-22

## 2025-07-22 RX ORDER — HYDROCORTISONE 25 MG/G
CREAM TOPICAL DAILY PRN
COMMUNITY
End: 2025-07-24

## 2025-07-22 RX ORDER — PHENYTOIN SODIUM 100 MG/1
200 CAPSULE, EXTENDED RELEASE ORAL NIGHTLY
COMMUNITY

## 2025-07-22 RX ORDER — ASPIRIN 81 MG/1
81 TABLET ORAL DAILY
Status: CANCELLED | OUTPATIENT
Start: 2025-07-22

## 2025-07-22 RX ORDER — LEVOTHYROXINE SODIUM 75 UG/1
150 TABLET ORAL
Status: CANCELLED | OUTPATIENT
Start: 2025-07-22

## 2025-07-22 RX ORDER — ATORVASTATIN CALCIUM 20 MG/1
20 TABLET, FILM COATED ORAL NIGHTLY
Status: CANCELLED | OUTPATIENT
Start: 2025-07-22

## 2025-07-22 RX ORDER — PHENYTOIN SODIUM 100 MG/1
100 CAPSULE, EXTENDED RELEASE ORAL DAILY
COMMUNITY

## 2025-07-22 RX ORDER — METOPROLOL TARTRATE 50 MG
50 TABLET ORAL 2 TIMES DAILY
Status: CANCELLED | OUTPATIENT
Start: 2025-07-22

## 2025-07-22 RX ORDER — POTASSIUM CHLORIDE 1500 MG/1
40 TABLET, EXTENDED RELEASE ORAL ONCE
Status: COMPLETED | OUTPATIENT
Start: 2025-07-22 | End: 2025-07-22

## 2025-07-22 RX ORDER — DEXAMETHASONE SODIUM PHOSPHATE 4 MG/ML
1 INJECTION, SOLUTION INTRAMUSCULAR; INTRAVENOUS DAILY
Status: ON HOLD | COMMUNITY
End: 2025-07-22

## 2025-07-22 NOTE — ED PROVIDER NOTES
Patient Seen in: [facility] Emergency Department        History  Chief Complaint   Patient presents with    Trauma    Fall     Stated Complaint: multiple falls, from [facility].    Subjective:   HPI            85-year-old female sent from nursing home for evaluation of multiple falls.  Reportedly found next to her bed today.  Patient reports she has been feeling weaker than normal.  Reports she does not think she eats or drinks enough.  Reporting pain to bilateral knees.  She reports she has been having some diarrhea.  She also reports urinary urgency and dysuria.  No fevers.  Denies any head injury.      Objective:     Past Medical History:    Anxiety state    Atherosclerosis of coronary artery    Coronary atherosclerosis    Dementia (HCC)    Dementia without behavioral disturbance (HCC)    Disorder of thyroid    Esophageal reflux    Essential hypertension    GERD without esophagitis    Hearing impairment    left side partial hearing loss    High blood pressure    High cholesterol    History of blood clots    Hyperlipidemia    Hypertensive chronic kidney disease    Hypothyroidism    Incontinence    Muscle weakness    Personal history of venous thrombosis and embolism    Pneumonia due to organism    Presence of coronary angioplasty implant and graft    Psoriasis    Renal disorder    Seizure disorder (HCC)    Visual impairment    Vitamin D deficiency              Past Surgical History:   Procedure Laterality Date    Cath bare metal stent (bms)  05/03/2011    xience V    Colonoscopy      Hysterectomy      Total knee replacement      Left knee                Social History     Socioeconomic History    Marital status:    Tobacco Use    Smoking status: Never    Smokeless tobacco: Never   Vaping Use    Vaping status: Never Used   Substance and Sexual Activity    Alcohol use: Not Currently    Drug use: Never     Social Drivers of Health     Food Insecurity: No Food Insecurity (10/21/2024)    Food Insecurity      Food Insecurity: Never true   Transportation Needs: No Transportation Needs (1/29/2025)    Transportation Needs     Lack of Transportation: No   Housing Stability: Unknown (10/21/2024)    Housing Stability     Housing Instability: Patient unable to answer                                Physical Exam    ED Triage Vitals [07/22/25 0434]   /83   Pulse 107   Resp 18   Temp 97.8 °F (36.6 °C)   Temp src Oral   SpO2 100 %   O2 Device None (Room air)       Current Vitals:   Vital Signs  BP: 104/83  Pulse: 93  Resp: 11  Temp: 97.8 °F (36.6 °C)  Temp src: Oral    Oxygen Therapy  SpO2: 100 %  O2 Device: None (Room air)            Physical Exam  General:  Vitals as listed.  No acute distress   HEENT: Head atraumatic.  Sclerae anicteric.  Conjunctivae show no pallor.  Oropharynx clear, mucous membranes moist   Neck: supple, no rigidity   Lungs: good air exchange and clear   Heart: regular rate rhythm and no murmur   Abdomen: Soft and nontender.  No abdominal masses.  No peritoneal signs   Extremities: Contusions to bilateral knees.  No deformities.  No edema, normal peripheral pulses.  Scar over left knee consistent with previous total knee replacement.  Neuro: Awake and alert.  Moving all extremities.  Skin: no rashes or nodules        ED Course  Labs Reviewed   URINALYSIS WITH CULTURE REFLEX - Abnormal; Notable for the following components:       Result Value    Urine Color Orange (*)     Clarity Urine Ex.Turbid (*)     WBC Urine >50 (*)     RBC Urine >10 (*)     Bacteria Urine 1+ (*)     Yeast Urine Present (*)     WBC Clump Present (*)     All other components within normal limits   COMP METABOLIC PANEL (14) - Abnormal; Notable for the following components:    Glucose 120 (*)     Sodium 126 (*)     Chloride 93 (*)     Creatinine 1.87 (*)     Calculated Osmolality 265 (*)     eGFR-Cr 26 (*)     AST 34 (*)     All other components within normal limits   CBC WITH DIFFERENTIAL WITH PLATELET - Abnormal; Notable for the  following components:    WBC 13.4 (*)     RBC 3.75 (*)     HCT 33.4 (*)     MCHC 37.4 (*)     Neutrophil Absolute Prelim 11.17 (*)     Neutrophil Absolute 11.17 (*)     Lymphocyte Absolute 0.85 (*)     Monocyte Absolute 1.18 (*)     All other components within normal limits   TSH W REFLEX TO FREE T4 - Abnormal; Notable for the following components:    TSH 0.214 (*)     All other components within normal limits   T4, FREE (S) - Abnormal; Notable for the following components:    Free T4 1.9 (*)     All other components within normal limits   SPECIFIC GRAVITY, URINE   LACTIC ACID, PLASMA   URINE CULTURE, ROUTINE   BLOOD CULTURE   BLOOD CULTURE          X-ray left knee, 2 views  X-ray right knee, 2 views  IMPRESSION:  Left knee:  Total knee replacement without acute hardware complication.  No acute fracture or malalignment.    Right knee:  No acute fracture or malalignment.  Tricompartment degenerative changes of the knee joint.  Prepatellar and infrapatellar soft tissue edema                  MDM     85-year-old female sent from nursing home for evaluation of multiple falls.  Reporting poor p.o. intake and fatigue.  Also reporting some diarrhea.  Contusions to bilateral knees    Differential includes but is not limited to dehydration, metabolic disturbance, contusions, a life/function threat.    CBC, CMP, urinalysis, TSH ordered for further evaluation.  1 L NS bolus    My independent interpretation of x-ray of the knees is that there is no obvious displaced fracture.  Reviewed radiology documentation which reports the same    Laboratory evaluation shows evidence of a UTI.  Rocephin 1 g IV ordered.  Chemistry shows GOPI with a creatinine of 1.87 which is well above her baseline.  Hyponatremia at 126.  Hypochloremia at 93.  Mild leukocytosis at 13.4.  Blood cultures and lactic acid ordered.  Receiving IV fluids.  Will admit for further management.  Discussed with admitting physician        Admission disposition: 7/22/2025   7:11 AM           Medical Decision Making      Disposition and Plan     Clinical Impression:  1. GOPI (acute kidney injury)    2. Hyponatremia    3. Urinary tract infection with hematuria, site unspecified    4. Hypochloremia         Disposition:  Admit  7/22/2025  7:11 am    Follow-up:  No follow-up provider specified.        Medications Prescribed:  Current Discharge Medication List                Supplementary Documentation:         Hospital Problems       Present on Admission  Date Reviewed: 6/17/2025          ICD-10-CM Noted POA    * (Principal) GOPI (acute kidney injury) N17.9 7/22/2025 Unknown

## 2025-07-22 NOTE — RESPIRATORY THERAPY NOTE
History of OSMANY. Does not use CPAP at home.    OSMANY protocol in place .    Will continue to follow RT protocol.    Gissell Taylor, RRT

## 2025-07-22 NOTE — PROGRESS NOTES
Received patient from in stable condition  Diagnosis of UTI, culture in progress  Patient AOX4, room air, vital signs stable  SR on Tele  Saline locked  Noted wounds on coccyx and left foot 2nd toe, pictures taken  Med Rec done  Will monitor and continue plan of care

## 2025-07-22 NOTE — CM/SW NOTE
07/22/25 1530   CM/SW Referral Data   Reason for Referral Discharge planning   Medical Hx   Does patient have an established PCP? Yes   Patient Info   Patient's Current Mental Status at Time of Assessment Alert   Patient's Home Environment Assisted Living   Post Acute Care Provider Upon Admission [facility] Assisted   Number of Levels in Home 1   Patient lives with Alone   Patient Status Prior to Admission   Independent with ADLs and Mobility Yes     Met at bedside with pt to discuss discharge planning. Pt reports she has lived at [facility] for 7 years and hopes to return on discharge. She receives assistance with household chores but otherwise is relatively independent. Pt states things were going well at home before admission. She ambulates with use of a RW and is able to ambulate with her RW to the dining rosen at baseline. Pt reports she has been to the rehab portion of [facility] previously and would prefer this location if she needs BURAK on discharge. Pt's goal is to return home to her [facility] on discharge. She does attend PT 3x/week within the community. Discussed that discharge planning is an ongoing process and that well continue to evaluate her needs to formulate a plan.     Pt denied any further questions and was appreciative of visit.    CM/SW to remain available to assist with discharge planning.

## 2025-07-22 NOTE — ED QUICK NOTES
Orders for admission, patient is aware of plan and ready to go upstairs. Any questions, please call ED RN vidal at extension [phone].     Patient Covid vaccination status: Fully vaccinated     COVID Test Ordered in ED: None    COVID Suspicion at Admission: N/A    Running Infusions: Medication Infusions[1] None    Mental Status/LOC at time of transport: a/o x3    Other pertinent information:   CIWA score: N/A   NIH score:  N/A             [1]

## 2025-07-22 NOTE — ED INITIAL ASSESSMENT (HPI)
Pt arrived via ems from [facility], called for multiple falls, found next to her bed. Per pt, her bed is \"too high\".  C/O pain to mel knees. Also reports urinary urgency and burning w urination at times with vag dryness.

## 2025-07-23 LAB
ALBUMIN SERPL-MCNC: 3.9 G/DL (ref 3.2–4.8)
ALBUMIN/GLOB SERPL: 1.3 (ref 1–2)
ALP LIVER SERPL-CCNC: 85 U/L (ref 55–142)
ALT SERPL-CCNC: 18 U/L (ref 10–49)
ANION GAP SERPL CALC-SCNC: 14 MMOL/L (ref 0–18)
AST SERPL-CCNC: 29 U/L (ref ?–34)
BASOPHILS # BLD AUTO: 0.05 X10(3) UL (ref 0–0.2)
BASOPHILS NFR BLD AUTO: 0.5 %
BILIRUB SERPL-MCNC: 0.2 MG/DL (ref 0.2–1.1)
BUN BLD-MCNC: 16 MG/DL (ref 9–23)
CALCIUM BLD-MCNC: 9.5 MG/DL (ref 8.7–10.6)
CHLORIDE SERPL-SCNC: 102 MMOL/L (ref 98–112)
CO2 SERPL-SCNC: 19 MMOL/L (ref 21–32)
CREAT BLD-MCNC: 1.33 MG/DL (ref 0.55–1.02)
EGFRCR SERPLBLD CKD-EPI 2021: 40 ML/MIN/1.73M2 (ref 60–?)
EOSINOPHIL # BLD AUTO: 0.12 X10(3) UL (ref 0–0.7)
EOSINOPHIL NFR BLD AUTO: 1.2 %
ERYTHROCYTE [DISTWIDTH] IN BLOOD BY AUTOMATED COUNT: 12.1 %
GLOBULIN PLAS-MCNC: 3.1 G/DL (ref 2–3.5)
GLUCOSE BLD-MCNC: 115 MG/DL (ref 70–99)
HCT VFR BLD AUTO: 33.7 % (ref 35–48)
HGB BLD-MCNC: 12.1 G/DL (ref 12–16)
IMM GRANULOCYTES # BLD AUTO: 0.06 X10(3) UL (ref 0–1)
IMM GRANULOCYTES NFR BLD: 0.6 %
LYMPHOCYTES # BLD AUTO: 1 X10(3) UL (ref 1–4)
LYMPHOCYTES NFR BLD AUTO: 9.9 %
MCH RBC QN AUTO: 33 PG (ref 26–34)
MCHC RBC AUTO-ENTMCNC: 35.9 G/DL (ref 31–37)
MCV RBC AUTO: 91.8 FL (ref 80–100)
MONOCYTES # BLD AUTO: 1.28 X10(3) UL (ref 0.1–1)
MONOCYTES NFR BLD AUTO: 12.6 %
NEUTROPHILS # BLD AUTO: 7.62 X10 (3) UL (ref 1.5–7.7)
NEUTROPHILS # BLD AUTO: 7.62 X10(3) UL (ref 1.5–7.7)
NEUTROPHILS NFR BLD AUTO: 75.2 %
OSMOLALITY SERPL CALC.SUM OF ELEC: 282 MOSM/KG (ref 275–295)
PLATELET # BLD AUTO: 270 10(3)UL (ref 150–450)
POTASSIUM SERPL-SCNC: 4.4 MMOL/L (ref 3.5–5.1)
POTASSIUM SERPL-SCNC: 4.4 MMOL/L (ref 3.5–5.1)
PROT SERPL-MCNC: 7 G/DL (ref 5.7–8.2)
RBC # BLD AUTO: 3.67 X10(6)UL (ref 3.8–5.3)
SODIUM SERPL-SCNC: 135 MMOL/L (ref 136–145)
WBC # BLD AUTO: 10.1 X10(3) UL (ref 4–11)

## 2025-07-23 PROCEDURE — 85025 COMPLETE CBC W/AUTO DIFF WBC: CPT | Performed by: INTERNAL MEDICINE

## 2025-07-23 PROCEDURE — 84132 ASSAY OF SERUM POTASSIUM: CPT | Performed by: INTERNAL MEDICINE

## 2025-07-23 PROCEDURE — 97165 OT EVAL LOW COMPLEX 30 MIN: CPT

## 2025-07-23 PROCEDURE — 80053 COMPREHEN METABOLIC PANEL: CPT | Performed by: INTERNAL MEDICINE

## 2025-07-23 PROCEDURE — 97530 THERAPEUTIC ACTIVITIES: CPT

## 2025-07-23 RX ORDER — CLONAZEPAM 0.5 MG/1
1 TABLET ORAL NIGHTLY
Status: DISCONTINUED | OUTPATIENT
Start: 2025-07-23 | End: 2025-07-24

## 2025-07-23 RX ORDER — LACOSAMIDE 100 MG/1
100 TABLET ORAL 2 TIMES DAILY
Status: DISCONTINUED | OUTPATIENT
Start: 2025-07-23 | End: 2025-07-24

## 2025-07-23 RX ORDER — METOPROLOL SUCCINATE 25 MG/1
25 TABLET, EXTENDED RELEASE ORAL DAILY
Status: DISCONTINUED | OUTPATIENT
Start: 2025-07-23 | End: 2025-07-24

## 2025-07-23 RX ORDER — ACETAMINOPHEN 500 MG
500 TABLET ORAL EVERY 6 HOURS PRN
Status: DISCONTINUED | OUTPATIENT
Start: 2025-07-23 | End: 2025-07-24

## 2025-07-23 RX ORDER — POLYVINYL ALCOHOL 14 MG/ML
1 SOLUTION/ DROPS OPHTHALMIC 4 TIMES DAILY PRN
Status: DISCONTINUED | OUTPATIENT
Start: 2025-07-23 | End: 2025-07-24

## 2025-07-23 RX ORDER — AMOXICILLIN 500 MG/1
500 CAPSULE ORAL EVERY 8 HOURS SCHEDULED
Status: DISCONTINUED | OUTPATIENT
Start: 2025-07-24 | End: 2025-07-24

## 2025-07-23 RX ORDER — LACOSAMIDE 100 MG/1
100 TABLET ORAL ONCE
Status: DISCONTINUED | OUTPATIENT
Start: 2025-07-23 | End: 2025-07-23

## 2025-07-23 RX ORDER — AMOXICILLIN 500 MG
1200 CAPSULE ORAL DAILY
Status: DISCONTINUED | OUTPATIENT
Start: 2025-07-23 | End: 2025-07-23 | Stop reason: RX

## 2025-07-23 RX ORDER — LEVOTHYROXINE SODIUM 75 UG/1
150 TABLET ORAL
Status: DISCONTINUED | OUTPATIENT
Start: 2025-07-23 | End: 2025-07-24

## 2025-07-23 RX ORDER — POLYETHYLENE GLYCOL 3350 17 G/17G
17 POWDER, FOR SOLUTION ORAL DAILY
Status: DISCONTINUED | OUTPATIENT
Start: 2025-07-23 | End: 2025-07-24

## 2025-07-23 RX ORDER — LACTULOSE 10 G/15ML
10 SOLUTION ORAL DAILY PRN
Status: DISCONTINUED | OUTPATIENT
Start: 2025-07-23 | End: 2025-07-24

## 2025-07-23 RX ORDER — FAMOTIDINE 20 MG/1
20 TABLET, FILM COATED ORAL DAILY
Status: DISCONTINUED | OUTPATIENT
Start: 2025-07-23 | End: 2025-07-24

## 2025-07-23 RX ORDER — PHENYTOIN SODIUM 100 MG/1
100 CAPSULE, EXTENDED RELEASE ORAL DAILY
Status: DISCONTINUED | OUTPATIENT
Start: 2025-07-23 | End: 2025-07-23

## 2025-07-23 RX ORDER — PHENYTOIN SODIUM 100 MG/1
200 CAPSULE, EXTENDED RELEASE ORAL ONCE
Status: COMPLETED | OUTPATIENT
Start: 2025-07-23 | End: 2025-07-23

## 2025-07-23 RX ORDER — PHENYTOIN SODIUM 100 MG/1
200 CAPSULE, EXTENDED RELEASE ORAL NIGHTLY
Status: DISCONTINUED | OUTPATIENT
Start: 2025-07-23 | End: 2025-07-24

## 2025-07-23 RX ORDER — ATORVASTATIN CALCIUM 20 MG/1
20 TABLET, FILM COATED ORAL NIGHTLY
Status: DISCONTINUED | OUTPATIENT
Start: 2025-07-23 | End: 2025-07-24

## 2025-07-23 RX ORDER — SODIUM CHLORIDE 9 MG/ML
INJECTION, SOLUTION INTRAVENOUS CONTINUOUS
Status: ACTIVE | OUTPATIENT
Start: 2025-07-23 | End: 2025-07-23

## 2025-07-23 RX ORDER — PHENYTOIN SODIUM 100 MG/1
100 CAPSULE, EXTENDED RELEASE ORAL DAILY
Status: DISCONTINUED | OUTPATIENT
Start: 2025-07-24 | End: 2025-07-24

## 2025-07-23 NOTE — PLAN OF CARE
Received patient @ 0150, patient is comfortable in the bed  Vital stable   Incontinent of urine, purewick in place  Call light within reach, safety precaution in place     Poc:   IV Rocephin Q24  Pt/ot to see

## 2025-07-23 NOTE — H&P
[facility]  History & Physical    Alysia Norman Patient Status:  Inpatient    1940 MRN [id number]   Location [facility] 0SW-A Attending Alondra Burgess MD   Hosp Day # 1 PCP Alondra Burgess MD     History of Present Illness:  Alysia Norman is a(n) 85 year old female.     History:  Past Medical History[1]  Past Surgical History[2]  Family History[3]   reports that she has never smoked. She has never used smokeless tobacco. She reports that she does not currently use alcohol. She reports that she does not use drugs.    Allergies:  Allergies[4]    Home Medications:  Prescriptions Prior to Admission[5]    Review of Systems:  A comprehensive 10 point review of systems was completed.  Pertinent positives and negatives noted in the the HPI.    Physical Exam:  Vital signs: Blood pressure 122/75, pulse 105, temperature 98.1 °F (36.7 °C), temperature source Oral, resp. rate 18, height 5' 2\" (1.575 m), weight 147 lb (66.7 kg), SpO2 93%.  General: No acute distress. Alert and oriented x 3.  HEENT: Moist mucous membranes. EOM-I. PERRL  Neck: No lymphadenopathy.  No JVD. No carotid bruits.  Respiratory: Clear to auscultation bilaterally.  No wheezes. No rhonchi.  Cardiovascular: S1, S2.  Regular rate and rhythm.  No murmurs. Equal pulses   Abdomen: Soft, nontender, nondistended.  Positive bowel sounds. No rebound tenderness  Neurologic: No focal neurological deficits.  Musculoskeletal: Full range of motion of all extremities.  No swelling noted.  Integument: No lesions. No erythema.  Psychiatric: Appropriate mood and affect.    Laboratory Data:   Lab Results   Component Value Date    WBC 10.1 2025    HGB 12.1 2025    HCT 33.7 2025    .0 2025    CREATSERUM 1.33 2025    BUN 16 2025     2025    K 4.4 2025    K 4.4 2025     2025    CO2 19.0 2025     2025    CA 9.5 2025    ALB 3.9 2025    ALKPHO 85  07/23/2025    BILT 0.2 07/23/2025    TP 7.0 07/23/2025    AST 29 07/23/2025    ALT 18 07/23/2025       Imaging:  XR KNEE (1 OR 2 VIEWS), RIGHT (CPT=73560)  Result Date: 7/22/2025  PROCEDURE: XR KNEE (1 OR 2 VIEWS), LEFT (CPT=73560), XR KNEE (1 OR 2 VIEWS), RIGHT (CPT=73560) INDICATIONS: multiple falls, from [facility]. PATIENT STATED HISTORY: bilateral knee pain from multiple falls. (accession [id number]), bilateral knee pain from multiple falls (accession [id number]) COMPARISON: TECHNIQUE: Multiple views of the knee were obtained. FINDINGS: Total left knee arthroplasty. Osseous structures are intact. Tricompartmental joint space narrowing with small marginal osteophytes involve the right knee. No significant joint effusion. There is prepatellar and infrapatellar soft tissue swelling involving the right knee.     CONCLUSION: Total left knee arthroplasty. Osseous structures are intact involving the knees bilaterally. Please see details as above. Clinical service notified of these findings with preliminary radiology report from [facility] radiology. Electronically Verified and Signed by Attending Radiologist: Gema Mederos MD 7/22/2025 8:20 AM Workstation: [id number]    XR KNEE (1 OR 2 VIEWS), LEFT (CPT=73560)  Result Date: 7/22/2025  PROCEDURE: XR KNEE (1 OR 2 VIEWS), LEFT (CPT=73560), XR KNEE (1 OR 2 VIEWS), RIGHT (CPT=73560) INDICATIONS: multiple falls, from [facility]. PATIENT STATED HISTORY: bilateral knee pain from multiple falls. (accession [id number]), bilateral knee pain from multiple falls (accession [id number]) COMPARISON: TECHNIQUE: Multiple views of the knee were obtained. FINDINGS: Total left knee arthroplasty. Osseous structures are intact. Tricompartmental joint space narrowing with small marginal osteophytes involve the right knee. No significant joint effusion. There is prepatellar and infrapatellar soft tissue swelling involving the right knee.     CONCLUSION: Total left knee arthroplasty. Osseous  structures are intact involving the knees bilaterally. Please see details as above. Clinical service notified of these findings with preliminary radiology report from [facility]. Electronically Verified and Signed by Attending Radiologist: Gema Mederos MD 7/22/2025 8:20 AM Workstation: [id number]       Assessment & Plan:  Problem List[6]    ***    Total time spent with the patient exceeded 75 minutes, with more than half dedicated to counseling and coordination of care.    Alondra Burgess MD  7/23/2025  9:06 AM         [1]   Past Medical History:   Anxiety state    Atherosclerosis of coronary artery    Coronary atherosclerosis    Dementia (HCC)    Dementia without behavioral disturbance (HCC)    Disorder of thyroid    Esophageal reflux    Essential hypertension    GERD without esophagitis    Hearing impairment    left side partial hearing loss    High blood pressure    High cholesterol    History of blood clots    Hyperlipidemia    Hypertensive chronic kidney disease    Hypothyroidism    Incontinence    Muscle weakness    Personal history of venous thrombosis and embolism    Pneumonia due to organism    Presence of coronary angioplasty implant and graft    Psoriasis    Renal disorder    Seizure disorder (HCC)    Visual impairment    Vitamin D deficiency   [2]   Past Surgical History:  Procedure Laterality Date    Cath bare metal stent (bms)  05/03/2011    xience V    Colonoscopy      Hysterectomy      Total knee replacement      Left knee   [3] No family history on file.  [4]   Allergies  Allergen Reactions    Carbatrol [Equetro] UNKNOWN    Latex UNKNOWN    Oxcarbazepine UNKNOWN     myalgias    Sertraline UNKNOWN    Topamax [Topiramate] UNKNOWN    Zonisamide UNKNOWN    Hydrochlorothiazide UNKNOWN and RASH    Levetiracetam UNKNOWN and RASH    Valproic Acid RASH     (Depakote)    Valsartan UNKNOWN and RASH     (Diovan)   [5]   Medications Prior to Admission   Medication Sig Dispense Refill Last Dose/Taking     betamethasone dipropionate 0.05 % External Cream Apply topically in the morning and before bedtime.   7/21/2025    Calcium Carbonate Antacid 1000 MG Oral Chew Tab Chew 1,200 mg by mouth daily.   Taking    phenytoin  MG Oral Cap Take 1 capsule (100 mg total) by mouth in the morning.   7/21/2025    phenytoin  MG Oral Cap Take 2 capsules (200 mg total) by mouth at bedtime.   7/21/2025    metoprolol succinate ER 25 MG Oral Tablet 24 Hr Take 1 tablet (25 mg total) by mouth daily. Hold if SBP less than 100   7/21/2025    polyvinyl alcohol 1.4 % Ophthalmic Solution Place 0.05 mL (1 drop total) into both eyes 4 (four) times daily as needed.   Taking As Needed    hydrocortisone (PROCTOSOL HC) 2.5 % External Cream Place rectally daily as needed for Hemorrhoids.   Taking As Needed    acetaminophen 500 MG Oral Tab Take 1 tablet (500 mg total) by mouth every 6 (six) hours as needed for Pain.   Taking As Needed    levothyroxine 150 MCG Oral Tab TAKE ONE TABLET BY MOUTH EVERY MORNING BEFORE BREAKFAST 14 tablet 11 7/21/2025 Morning    GEMTESA 75 MG Oral Tab TAKE ONE TABLET BY MOUTH EVERY DAY 14 tablet 10 7/21/2025    CLONAZEPAM 1 MG Oral Tab TAKE ONE TABLET BY MOUTH AT BEDTIME 14 tablet 4 7/21/2025    FLUTICASONE PROPIONATE 50 MCG/ACT Nasal Suspension INSTILL ONE SPRAY INTO EACH NOSTRIL EVERY 12 HOURS 16 g 11 7/21/2025    ATORVASTATIN 20 MG Oral Tab TAKE ONE TABLET BY MOUTH NIGHTLY 14 tablet 10 7/21/2025    FAMOTIDINE 20 MG Oral Tab TAKE ONE TABLET BY MOUTH EVERY DAY, STOP PROTONIX 14 tablet 10 7/21/2025    lacosamide (VIMPAT) 100 MG Oral Tab Take 1 tablet (100 mg total) by mouth 2 (two) times daily. At 0900 am and 2000 60 tablet 0 7/21/2025    Omega-3 Fatty Acids (FISH OIL) 1200 MG Oral Cap Take 1,200 mg by mouth in the morning.   7/21/2025    Polyethylene Glycol 3350 (MIRALAX) 17 g Oral Powd Pack 1 packet Monday, Wednesday and Friday 30 each 11     hydrocortisone 2.5 % External Ointment Apply 1 Application topically 2  (two) times daily as needed (to scalp for psoriasis).       hydrocortisone (PROCTOSOL HC) 2.5 % External Cream Place 1 Application rectally 2 (two) times daily as needed (scalp).       lactulose 10 GM/15ML Oral Solution Take 15 mL (10 g total) by mouth daily as needed.       Calcium Carbonate Antacid (TUMS) 500 MG Oral Chew Tab Chew 1 tablet (500 mg total) by mouth 2 (two) times daily as needed for Heartburn. 28 tablet 1    [6]   Patient Active Problem List  Diagnosis    Seizure (HCC)    Recurrent seizures (HCC)    Hypothyroidism    Mixed hyperlipidemia    Essential hypertension    Convulsions (HCC)    Convulsions, unspecified convulsion type (HCC)    Dementia without behavioral disturbance (HCC)    Unsteady gait    Coronary artery disease involving native coronary artery of native heart without angina pectoris    Obesity due to excess calories    Scalp psoriasis    Bilateral leg edema    Vitamin D deficiency    Osteoarthritis of both knees    Psoriasis    Hearing loss of left ear    Mixed stress and urge incontinence    Prediabetes    Partial symptomatic epilepsy with complex partial seizures, intractable, without status epilepticus ([facility])    OAB (overactive bladder)    Chronic constipation    Peripheral vascular disease, unspecified    Gait disturbance    Syncope, near    Orthostasis    Mild cognitive impairment    Non-seasonal allergic rhinitis    Mixed conductive and sensorineural hearing loss of both ears    Speech disturbance, unspecified type    Seizures (HCC)    Dementia (HCC)    Hearing loss associated with syndrome of both ears    Eczema    Pedal edema    Hyponatremia    SIADH (syndrome of inappropriate ADH production) (HCC)    GOPI (acute kidney injury)    Urinary tract infection with hematuria, site unspecified    Hypochloremia      (overactive bladder)    Chronic constipation    Peripheral vascular disease, unspecified    Gait disturbance    Syncope, near    Orthostasis    Mild cognitive impairment    Non-seasonal allergic rhinitis    Mixed conductive and sensorineural hearing loss of both ears    Speech disturbance, unspecified type    Seizures ([facility])    Dementia ([facility])    Hearing loss associated with syndrome of both ears    Eczema    Pedal edema    Hyponatremia    SIADH (syndrome of inappropriate ADH production) ([facility])    GOPI (acute kidney injury)    Urinary tract infection with hematuria, site unspecified    Hypochloremia

## 2025-07-23 NOTE — PHYSICAL THERAPY NOTE
PHYSICAL THERAPY EVALUATION - INPATIENT     Room Number: 0025/0025-A  Evaluation Date: 7/23/2025  Type of Evaluation: Initial  Physician Order: PT Eval and Treat    Presenting Problem: Multiple falls, acute kidney injury  Co-Morbidities : Recurrent seizures, hypothyroidism, HTN, dementia, CAD, osteoarthitis of B knees,  Reason for Therapy: Mobility Dysfunction and Discharge Planning    PHYSICAL THERAPY ASSESSMENT   Patient is a 85 year old female admitted 7/22/2025 for multiple falls. Prior to admission, patient's baseline is Mod-I with rollator and assistance with ADLs at [facility].  Patient is currently functioning near baseline with bed mobility, transfers, gait, and standing prolonged periods.  Patient is requiring supervision and contact guard assist as a result of the following impairments: decreased functional strength, decreased endurance/aerobic capacity, impaired static and dynamic standing balance, decreased muscular endurance, and cognitive deficits (dementia).  Physical Therapy will continue to follow for duration of hospitalization.    Patient will benefit from continued skilled PT Services at discharge to promote prior level of function and safety with additional support and return home with home health PT.    PLAN DURING HOSPITALIZATION  Nursing Mobility Recommendation : 1 Assist (With chair follow)  PT Device Recommendation: Rolling walker  PT Treatment Plan: Endurance, Patient education, Gait training, Strengthening, Transfer training, Balance training  Rehab Potential : Fair  Frequency (Obs): 3-5x/week     CURRENT GOALS    Goal #1 Patient is able to demonstrate transfers EOB to/from Chair/Wheelchair at assistance level: supervision     Goal #2 Patient is able to ambulate 100 feet with assist device: walker - rolling at assistance level: supervision     Goal #3    Goal #4    Goal #5    Goal #6    Goal Comments: Goals established on 7/23/2025      PHYSICAL THERAPY MEDICAL/SOCIAL  HISTORY  History related to current admission: Patient is a 85 year old female admitted on 2025 from [facility] for multiple falls, found next to bed.  Pt diagnosed with GOPI, hyponatremia, and UTI.    Recent visits:  ER visit 25 fell, sitting on toilet reaching for items  ER visit 25 fell, head injury, lost balance reaching for medicine  ER visit 25 fell going to bathroom  ER visit 25 fell slipping at store and hit head    HOME SITUATION  Type of Home: Assisted living facility ([facility])  Home Layout: One level                     Lives With: Staff 24 hours        Patient Regularly Uses: Rolling walker      Prior Level of [place]: Pt is typically Mod-I with rollator at [facility]. Receives assistance with ADLs upon request, admits that she should ask for help more frequently. Has had meals brought to her room instead of going to [facility] since  due to recent fatigue.    SUBJECTIVE  \"I need to sit down a bit\"      OBJECTIVE  Precautions: Bed/chair alarm  Fall Risk: High fall risk    WEIGHT BEARING RESTRICTION     PAIN ASSESSMENT  Ratin          COGNITION  Overall Cognitive Status:  WFL - within functional limits    RANGE OF MOTION AND STRENGTH ASSESSMENT  Upper extremity ROM and strength are within functional limits     Lower extremity ROM is within functional limits     Lower extremity strength is within functional limits     BALANCE  Static Sitting: Fair +  Dynamic Sitting: Fair  Static Standing: Fair -  Dynamic Standing: Fair -    ADDITIONAL TESTS                                  ACTIVITY TOLERANCE  Pulse: 102  Heart Rate Source: Monitor     BP: 101/71  BP Location: Right arm  BP Method: Automatic  Patient Position: Sitting    O2 WALK  Oxygen Therapy  SPO2% on Room Air at Rest: 94    NEUROLOGICAL FINDINGS                        AM-PAC '6-Clicks' INPATIENT SHORT FORM - BASIC MOBILITY  How much difficulty does the patient currently have...  Patient Difficulty: Turning over  in bed (including adjusting bedclothes, sheets and blankets)?: A Little   Patient Difficulty: Sitting down on and standing up from a chair with arms (e.g., wheelchair, bedside commode, etc.): A Little   Patient Difficulty: Moving from lying on back to sitting on the side of the bed?: A Little   How much help from another person does the patient currently need...   Help from Another: Moving to and from a bed to a chair (including a wheelchair)?: A Little   Help from Another: Need to walk in hospital room?: A Little   Help from Another: Climbing 3-5 steps with a railing?: A Lot     AM-PAC Score:  Raw Score: 17   Approx Degree of Impairment: 50.57%   Standardized Score (AM-PAC Scale): 42.13   CMS Modifier (G-Code): CK    FUNCTIONAL ABILITY STATUS  Gait Assessment   Functional Mobility/Gait Assessment  Gait Assistance: Contact guard assist  Distance (ft): 50x1, 5x1  Assistive Device: Rolling walker  Pattern: Shuffle    Skilled Therapy Provided     Bed Mobility:  Rolling: Sup  Supine to sit: Sup   Sit to supine: NT - pt in bedside chair at end of session     Transfer Mobility:  Sit to stand: CGA - cues to push off from bed/chair   Stand to sit: CGA - cues to reach back prior to sitting  Gait = CGA ~50ftx1 and ~5ftx1 w/RW - chair follow during ambulation, pt required one sitting break due to fatigue.    Therapist's Comments: Pt agreeable to PT/OT session. /71 sitting EOB, attempted to re-assess in standing -> monitor unable to read. Pt able to ambulate with RW, required sitting break after ~50ft. Pt attempted further ambulation (~5ft), therapists ended session due to noticeable fatigue -> returned to room via bedside chair.     Exercise/Education Provided:  Body mechanics  Energy conservation  Functional activity tolerated  Gait training  Transfer training    Patient End of Session: Up in chair, Needs met, Call light within reach, RN aware of session/findings, All patient questions and concerns addressed, Hospital  anti-slip socks, Family present      Patient Evaluation Complexity Level:  History Moderate - 1 or 2 personal factors and/or co-morbidities   Examination of body systems Low -  addressing 1-2 elements   Clinical Presentation Low- Stable   Clinical Decision Making Low Complexity       PT Session Time: 20 minutes  Gait Training: 10 minutes

## 2025-07-23 NOTE — CM/SW NOTE
Department  notified of request for HHC, aidin referrals started. Assigned CM/SW to follow up with pt/family on further discharge planning.      Little Anders  DSC

## 2025-07-23 NOTE — OCCUPATIONAL THERAPY NOTE
OCCUPATIONAL THERAPY EVALUATION - INPATIENT     Room Number: 0025/0025-A  Evaluation Date: 7/23/2025  Type of Evaluation: Initial  Presenting Problem: Falls, found next to bed, GOPI, hyponatremia, UTI    Physician Order: IP Consult to Occupational Therapy  Reason for Therapy: ADL/IADL Dysfunction and Discharge Planning    OCCUPATIONAL THERAPY ASSESSMENT   Patient is currently functioning near baseline with toileting, lower body dressing, and transfers. Prior to admission, patient's baseline is Modified independent, supervision .  Patient is requiring CGA as a result of the following impairments: decreased endurance. Occupational Therapy will continue to follow for duration of hospitalization.    Patient will benefit from continued skilled OT Services at discharge to promote prior level of function and safety with additional support and return home with home health OT    History Related to Current Admission: Patient is a 85 year old female admitted on 7/22/2025 with Presenting Problem: Falls, found next to bed, GOPI, hyponatremia, UTI. Co-Morbidities : Recurrent seizures, hypothyroidism, HTN, dementia, CAD, osteoarthitis of B knees,    Recent visits:  ER visit 5/14/25 fell, sitting on toilet reaching for items  ER visit 5/12/25 fell, head injury, lost balance reaching for medicine  ER visit 4/30/25 fell going to bathroom  ER visit 1/28/25 fell slipping at store and hit head      EVALUATION SESSION:  Patient Start of Session: supine    FUNCTIONAL TRANSFER ASSESSMENT  Sit to Stand: Edge of Bed  Edge of Bed: Contact Guard Assist    BED MOBILITY  Supine to Sit : Supervision    COGNITION  Overall Cognitive Status:  WFL - within functional limits    Upper Extremity   ROM: within functional limits   Strength: within functional limits     EDUCATION PROVIDED  Patient Education : Role of Occupational Therapy; Plan of Care; Energy Conservation  Patient's Response to Education: Verbalized Understanding    Equipment used:  rw    Therapist comments: supine to sit supervision.  Seated , cga to stand attempted to take BP standing twice, but not reading it, cga with  RW to ambulate in hallway with chair follow. Reporting fatigue, cga to sit.  Pt was left with daughter.      Patient End of Session: Up in chair, Needs met, Call light within reach, RN aware of session/findings, All patient questions and concerns addressed, Family present    OCCUPATIONAL PROFILE    HOME SITUATION  Type of Home: Assisted living facility ([facility])  Home Layout: One level  Lives With: Staff 24 hours    Toilet and Equipment: Comfort height toilet, Grab bar  Shower/Tub and Equipment: Walk-in shower, Grab bar, Shower chair                Patient Regularly Uses: Rolling walker    Prior Level of Function: Pt lives at [facility] living [facility],  Mostly independent with ADL, but pt mentioned that she \"should\" ask for assistance with showering and some ADL tasks.  Uses rw to dining rosen.   Since last , pt had meals brought to her room.        PAIN ASSESSMENT  Ratin          OBJECTIVE  Precautions: Bed/chair alarm  Fall Risk: High fall risk    ASSESSMENTS    AM-PAC ‘6-Clicks’ Inpatient Daily Activity Short Form  -   Putting on and taking off regular lower body clothing?: A Little  -   Bathing (including washing, rinsing, drying)?: A Little  -   Toileting, which includes using toilet, bedpan or urinal? : A Little  -   Putting on and taking off regular upper body clothing?: None  -   Taking care of personal grooming such as brushing teeth?: None  -   Eating meals?: None    AM-PAC Score:  Score: 21  Approx Degree of Impairment: 32.79%  Standardized Score (AM-PAC Scale): 44.27       PLAN     OT Treatment Plan: Balance activities, Energy conservation/work simplification techniques, ADL training, UE strengthening/ROM, Patient/Family education  Rehab Potential : Fair  Frequency: 3x/week  Number of Visits to Meet Established Goals: 3    ADL Goals    Patient will perform lower body dressing:  with supervision  Patient will perform toileting: with supervision    Functional Transfer Goals  Patient will transfer to toilet:  with supervision    UE Exercise Program Goal  Patient will be independent with bilateral AROM HEP (home exercise program).    Additional Goals  Pt will incorporate 2 energy conservation techniques into ADL.    Patient Evaluation Complexity Level:   Occupational Profile/Medical History LOW - Brief history including review of medical or therapy records    Specific performance deficits impacting engagement in ADL/IADL LOW  1 - 3 performance deficits    Client Assessment/Performance Deficits MODERATE - Comorbidities and min to mod modifications of tasks    Clinical Decision Making LOW - Analysis of occupational profile, problem-focused assessments, limited treatment options    Overall Complexity LOW     OT Session Time: 20 minutes  Self-Care Home Management:  minutes  Therapeutic Activity: 10 minutes

## 2025-07-23 NOTE — PLAN OF CARE
Assumed patient care after handoff around 2300. VSS.  A/O x 4.  RA.  Tele - NSR.  VTE prophylaxis: heparin.  L FA piv: S/L.  Electrolyte protocol: cardiac.  Regular diet.  Incontinent of urine, purewick. Bedpan.  Per pt, baseline is walker.  All safety precautions in place.  Call light within reach.  Plan: IV Rocephin Q24. PT/OT to see.    Problem: PAIN - ADULT  Goal: Verbalizes/displays adequate comfort level or patient's stated pain goal  Description: INTERVENTIONS:  - Encourage pt to monitor pain and request assistance  - Assess pain using appropriate pain scale  - Administer analgesics based on type and severity of pain and evaluate response  - Implement non-pharmacological measures as appropriate and evaluate response  - Consider cultural and social influences on pain and pain management  - Manage/alleviate anxiety  - Utilize distraction and/or relaxation techniques  - Monitor for opioid side effects  - Notify MD/LIP if interventions unsuccessful or patient reports new pain  - Anticipate increased pain with activity and pre-medicate as appropriate  Outcome: Progressing     Problem: METABOLIC/FLUID AND ELECTROLYTES - ADULT  Goal: Electrolytes maintained within normal limits  Description: INTERVENTIONS:  - Monitor labs and rhythm and assess patient for signs and symptoms of electrolyte imbalances  - Administer electrolyte replacement as ordered  - Monitor response to electrolyte replacements, including rhythm and repeat lab results as appropriate  - Fluid restriction as ordered  - Instruct patient on fluid and nutrition restrictions as appropriate  Outcome: Progressing     Problem: SKIN/TISSUE INTEGRITY - ADULT  Goal: Incision(s), wounds(s) or drain site(s) healing without S/S of infection  Description: INTERVENTIONS:  - Assess and document risk factors for pressure ulcer development  - Assess and document skin integrity  - Assess and document dressing/incision, wound bed, drain sites and surrounding tissue  -  Implement wound care per orders  - Initiate isolation precautions as appropriate  - Initiate Pressure Ulcer prevention bundle as indicated  Outcome: Progressing

## 2025-07-23 NOTE — PROGRESS NOTES
Assumed care at 0730  Patient AOX3-4, forgetful, room air, ST on tele  IVF started   Urine culture in progress  On Rocephin  Anti-seizure medications given  PT/OT consult done  Up in chair  Will continue plan of care

## 2025-07-24 VITALS
SYSTOLIC BLOOD PRESSURE: 136 MMHG | HEART RATE: 88 BPM | WEIGHT: 147 LBS | OXYGEN SATURATION: 97 % | DIASTOLIC BLOOD PRESSURE: 83 MMHG | BODY MASS INDEX: 27.05 KG/M2 | RESPIRATION RATE: 24 BRPM | HEIGHT: 62 IN | TEMPERATURE: 97 F

## 2025-07-24 LAB
ANION GAP SERPL CALC-SCNC: 8 MMOL/L (ref 0–18)
BASOPHILS # BLD AUTO: 0.06 X10(3) UL (ref 0–0.2)
BASOPHILS NFR BLD AUTO: 0.7 %
BUN BLD-MCNC: 16 MG/DL (ref 9–23)
CALCIUM BLD-MCNC: 9 MG/DL (ref 8.7–10.6)
CHLORIDE SERPL-SCNC: 104 MMOL/L (ref 98–112)
CO2 SERPL-SCNC: 24 MMOL/L (ref 21–32)
CREAT BLD-MCNC: 1.15 MG/DL (ref 0.55–1.02)
EGFRCR SERPLBLD CKD-EPI 2021: 47 ML/MIN/1.73M2 (ref 60–?)
EOSINOPHIL # BLD AUTO: 0.36 X10(3) UL (ref 0–0.7)
EOSINOPHIL NFR BLD AUTO: 3.9 %
ERYTHROCYTE [DISTWIDTH] IN BLOOD BY AUTOMATED COUNT: 12.2 %
GLUCOSE BLD-MCNC: 111 MG/DL (ref 70–99)
HCT VFR BLD AUTO: 31.2 % (ref 35–48)
HGB BLD-MCNC: 11 G/DL (ref 12–16)
IMM GRANULOCYTES # BLD AUTO: 0.08 X10(3) UL (ref 0–1)
IMM GRANULOCYTES NFR BLD: 0.9 %
LYMPHOCYTES # BLD AUTO: 1.35 X10(3) UL (ref 1–4)
LYMPHOCYTES NFR BLD AUTO: 14.6 %
MCH RBC QN AUTO: 32.7 PG (ref 26–34)
MCHC RBC AUTO-ENTMCNC: 35.3 G/DL (ref 31–37)
MCV RBC AUTO: 92.9 FL (ref 80–100)
MONOCYTES # BLD AUTO: 1.11 X10(3) UL (ref 0.1–1)
MONOCYTES NFR BLD AUTO: 12 %
NEUTROPHILS # BLD AUTO: 6.27 X10 (3) UL (ref 1.5–7.7)
NEUTROPHILS # BLD AUTO: 6.27 X10(3) UL (ref 1.5–7.7)
NEUTROPHILS NFR BLD AUTO: 67.9 %
OSMOLALITY SERPL CALC.SUM OF ELEC: 284 MOSM/KG (ref 275–295)
PHENYTOIN SERPL-MCNC: 6.9 UG/ML (ref 10–20)
PLATELET # BLD AUTO: 244 10(3)UL (ref 150–450)
POTASSIUM SERPL-SCNC: 4.3 MMOL/L (ref 3.5–5.1)
RBC # BLD AUTO: 3.36 X10(6)UL (ref 3.8–5.3)
SODIUM SERPL-SCNC: 136 MMOL/L (ref 136–145)
WBC # BLD AUTO: 9.2 X10(3) UL (ref 4–11)

## 2025-07-24 PROCEDURE — 80185 ASSAY OF PHENYTOIN TOTAL: CPT | Performed by: INTERNAL MEDICINE

## 2025-07-24 PROCEDURE — 85025 COMPLETE CBC W/AUTO DIFF WBC: CPT | Performed by: INTERNAL MEDICINE

## 2025-07-24 PROCEDURE — 80048 BASIC METABOLIC PNL TOTAL CA: CPT | Performed by: INTERNAL MEDICINE

## 2025-07-24 RX ORDER — ERGOCALCIFEROL 1.25 MG/1
50000 CAPSULE, LIQUID FILLED ORAL WEEKLY
Status: ON HOLD | COMMUNITY
End: 2025-08-09

## 2025-07-24 RX ORDER — AMOXICILLIN 500 MG/1
500 CAPSULE ORAL EVERY 8 HOURS SCHEDULED
Qty: 15 CAPSULE | Refills: 0 | Status: SHIPPED | OUTPATIENT
Start: 2025-07-24 | End: 2025-08-14

## 2025-07-24 RX ORDER — CRANBERRY CONC/C/BACILL COAG 250-30-15
2 TABLET ORAL DAILY
Qty: 60 TABLET | Refills: 11 | Status: SHIPPED | OUTPATIENT
Start: 2025-07-24

## 2025-07-24 RX ORDER — ASCORBIC ACID 500 MG
500 TABLET ORAL DAILY
Qty: 30 TABLET | Refills: 11 | Status: SHIPPED | OUTPATIENT
Start: 2025-07-24

## 2025-07-24 NOTE — PLAN OF CARE
Pt is Ok to discharge per Dr Burgess. Pt will return to [facility] with [facility] for PT/OT. Has been ambulating close to baseline. Discharge instructions including medications and follow ups given and patient and daughter verbalize understanding. Report called to Christine at [facility]. IV removed, tele monitor discontinued. All belongings taken with pt. Pt transported off unit via wheelchair.

## 2025-07-24 NOTE — PLAN OF CARE
Received patient at 0730. Alert and Oriented x4, forgetful. Tele Rhythm NSR. O2 saturation 96% On room air. Breath sounds clear. Bed is locked and in low position. Call light and personal items within reach. No C/O chest pain or shortness of breath. Pt with stress incontinence in brief. Pt up with assist and walker. Mepilex to coccyx, see flowsheet for wounds. Extra dose dilantin given today for low level, Dr Burgess to monitor. Plan to go back to [facility] today with home care. Reviewed plan of care and patient verbalizes understanding.       Problem: PAIN - ADULT  Goal: Verbalizes/displays adequate comfort level or patient's stated pain goal  Description: INTERVENTIONS:  - Encourage pt to monitor pain and request assistance  - Assess pain using appropriate pain scale  - Administer analgesics based on type and severity of pain and evaluate response  - Implement non-pharmacological measures as appropriate and evaluate response  - Consider cultural and social influences on pain and pain management  - Manage/alleviate anxiety  - Utilize distraction and/or relaxation techniques  - Monitor for opioid side effects  - Notify MD/LIP if interventions unsuccessful or patient reports new pain  - Anticipate increased pain with activity and pre-medicate as appropriate  Outcome: Progressing     Problem: METABOLIC/FLUID AND ELECTROLYTES - ADULT  Goal: Electrolytes maintained within normal limits  Description: INTERVENTIONS:  - Monitor labs and rhythm and assess patient for signs and symptoms of electrolyte imbalances  - Administer electrolyte replacement as ordered  - Monitor response to electrolyte replacements, including rhythm and repeat lab results as appropriate  - Fluid restriction as ordered  - Instruct patient on fluid and nutrition restrictions as appropriate  Outcome: Progressing     Problem: SKIN/TISSUE INTEGRITY - ADULT  Goal: Incision(s), wounds(s) or drain site(s) healing without S/S of infection  Description:  INTERVENTIONS:  - Assess and document risk factors for pressure ulcer development  - Assess and document skin integrity  - Assess and document dressing/incision, wound bed, drain sites and surrounding tissue  - Implement wound care per orders  - Initiate isolation precautions as appropriate  - Initiate Pressure Ulcer prevention bundle as indicated  Outcome: Progressing

## 2025-07-24 NOTE — CM/SW NOTE
Met with pt and daughter at bedside to discuss discharge planning. Pt/dtr expressing concerns with pt returning to Supportive Living facility today given her weakness and therapy sessions yesterday. Discussed Medicare guidelines for [employer] coverage. Dtr shared pt has Medicaid as a secondary insurance coverage. Discussed [facility] SNF may be able to accept under pt's Medicaid if both felt SNF was appropriate level of care on discharge. CM spoke with Nadine in admissions at [facility] who informs they do not have any Medicaid beds available. Offered to look into whether any other SNF may be able to accept under Medicaid and dtr agreed but indicating they are likely planning for pt to return to Supportive Living Facility today on discharge with [facility]. Recommended to dtr that pt/family potentially stay with pt on initial discharge back or check in with pt more frequently.     Call placed to [facility] and spoke with ISAIAS Blanco who will have DON call back to speak with this writer about pt's return.     Addendum: Discussed with attending and pt remains cleared for discharge today. Spoke with ANGEL Layton at [facility] and she approves pt to return. Kinjal shared that pt is a high fall risk at their facility and she's advised more assist to family in the past, however they do not have any additional levels of support at facility pt is in. Kinjal requests for AVS to be sent with pt and meds to be sent to [facility] pharmacy as listed.     Will discuss with dtr.     1240: Discussed with Kristina and she is in agreement with pt to return to [facility] Facility today with [facility] as pt previously had. CM will notify [facility]. Dtr will transport pt. Will update pt at bedside.     RN to call report to [facility] #[phone].  AVS to be sent with pt.    CM will send AVS to [facility] and request they see pt asap (hopefully Friday).    1300: Spoke with Callie  at [facility] who confirms pt is current with RN/PT/OT services through their agency. They can resume care at discharge. Informed of DC today and requested  they try to arrange first visit for tomorrow if possible. Callie states they should be able to do this. Expressed clinicals were sent via [employer] and AVS will be sent when available.    SHAUN FoxN, RN-BC

## 2025-07-24 NOTE — PLAN OF CARE
Received patient awake  and alert x 3, forgetful.  Normal sinus on Telemetry monitoring  O2 protocol, , sats 99%.   Fall precaution, bed alarm on, non-skid socks on,  call light and bedside table within reach.  Monitor intake and output.  Electrolyte protocol.  Heparin and SCD for VTE   Oral antibiotics for UTI.     Problem: PAIN - ADULT  Goal: Verbalizes/displays adequate comfort level or patient's stated pain goal  Description: INTERVENTIONS:  - Encourage pt to monitor pain and request assistance  - Assess pain using appropriate pain scale  - Administer analgesics based on type and severity of pain and evaluate response  - Implement non-pharmacological measures as appropriate and evaluate response  - Consider cultural and social influences on pain and pain management  - Manage/alleviate anxiety  - Utilize distraction and/or relaxation techniques  - Monitor for opioid side effects  - Notify MD/LIP if interventions unsuccessful or patient reports new pain  - Anticipate increased pain with activity and pre-medicate as appropriate  Outcome: Progressing     Problem: METABOLIC/FLUID AND ELECTROLYTES - ADULT  Goal: Electrolytes maintained within normal limits  Description: INTERVENTIONS:  - Monitor labs and rhythm and assess patient for signs and symptoms of electrolyte imbalances  - Administer electrolyte replacement as ordered  - Monitor response to electrolyte replacements, including rhythm and repeat lab results as appropriate  - Fluid restriction as ordered  - Instruct patient on fluid and nutrition restrictions as appropriate  Outcome: Progressing     Problem: SKIN/TISSUE INTEGRITY - ADULT  Goal: Incision(s), wounds(s) or drain site(s) healing without S/S of infection  Description: INTERVENTIONS:  - Assess and document risk factors for pressure ulcer development  - Assess and document skin integrity  - Assess and document dressing/incision, wound bed, drain sites and surrounding tissue  - Implement wound care  per orders  - Initiate isolation precautions as appropriate  - Initiate Pressure Ulcer prevention bundle as indicated  Outcome: Progressing

## 2025-08-05 ENCOUNTER — LAB REQUISITION (OUTPATIENT)
Dept: LAB | Facility: HOSPITAL | Age: 85
End: 2025-08-05

## 2025-08-05 DIAGNOSIS — R56.9 UNSPECIFIED CONVULSIONS (HCC): ICD-10-CM

## 2025-08-05 DIAGNOSIS — G40.219 LOCALIZATION-RELATED (FOCAL) (PARTIAL) SYMPTOMATIC EPILEPSY AND EPILEPTIC SYNDROMES WITH COMPLEX PARTIAL SEIZURES, INTRACTABLE, WITHOUT STATUS EPILEPTICUS (HCC): ICD-10-CM

## 2025-08-05 LAB
ALBUMIN SERPL-MCNC: 3.6 G/DL (ref 3.2–4.8)
ALBUMIN/GLOB SERPL: 1.3 (ref 1–2)
ALP LIVER SERPL-CCNC: 76 U/L (ref 55–142)
ALT SERPL-CCNC: 18 U/L (ref 10–49)
ANION GAP SERPL CALC-SCNC: 9 MMOL/L (ref 0–18)
AST SERPL-CCNC: 23 U/L (ref ?–34)
BILIRUB SERPL-MCNC: 0.2 MG/DL (ref 0.2–1.1)
BUN BLD-MCNC: 18 MG/DL (ref 9–23)
CALCIUM BLD-MCNC: 8.9 MG/DL (ref 8.7–10.6)
CHLORIDE SERPL-SCNC: 104 MMOL/L (ref 98–112)
CO2 SERPL-SCNC: 28 MMOL/L (ref 21–32)
CREAT BLD-MCNC: 1.43 MG/DL (ref 0.55–1.02)
EGFRCR SERPLBLD CKD-EPI 2021: 36 ML/MIN/1.73M2 (ref 60–?)
ERYTHROCYTE [DISTWIDTH] IN BLOOD BY AUTOMATED COUNT: 12.7 %
FASTING STATUS PATIENT QL REPORTED: YES
GLOBULIN PLAS-MCNC: 2.8 G/DL (ref 2–3.5)
GLUCOSE BLD-MCNC: 101 MG/DL (ref 70–99)
HCT VFR BLD AUTO: 31.2 % (ref 35–48)
HGB BLD-MCNC: 10.8 G/DL (ref 12–16)
MCH RBC QN AUTO: 32.7 PG (ref 26–34)
MCHC RBC AUTO-ENTMCNC: 34.6 G/DL (ref 31–37)
MCV RBC AUTO: 94.5 FL (ref 80–100)
OSMOLALITY SERPL CALC.SUM OF ELEC: 294 MOSM/KG (ref 275–295)
PHENYTOIN SERPL-MCNC: 9 UG/ML (ref 10–20)
PLATELET # BLD AUTO: 240 10(3)UL (ref 150–450)
POTASSIUM SERPL-SCNC: 4.1 MMOL/L (ref 3.5–5.1)
PROT SERPL-MCNC: 6.4 G/DL (ref 5.7–8.2)
RBC # BLD AUTO: 3.3 X10(6)UL (ref 3.8–5.3)
SODIUM SERPL-SCNC: 141 MMOL/L (ref 136–145)
WBC # BLD AUTO: 9.9 X10(3) UL (ref 4–11)

## 2025-08-05 PROCEDURE — 85027 COMPLETE CBC AUTOMATED: CPT

## 2025-08-05 PROCEDURE — 80185 ASSAY OF PHENYTOIN TOTAL: CPT

## 2025-08-05 PROCEDURE — 80053 COMPREHEN METABOLIC PANEL: CPT

## 2025-08-06 ENCOUNTER — TELEPHONE (OUTPATIENT)
Dept: UROLOGY | Facility: CLINIC | Age: 85
End: 2025-08-06

## 2025-08-09 ENCOUNTER — APPOINTMENT (OUTPATIENT)
Dept: CT IMAGING | Facility: HOSPITAL | Age: 85
End: 2025-08-09
Attending: EMERGENCY MEDICINE

## 2025-08-09 ENCOUNTER — HOSPITAL ENCOUNTER (INPATIENT)
Facility: HOSPITAL | Age: 85
LOS: 3 days | Discharge: NURSING, REHAB OR LONG-TERM CARE | End: 2025-08-14
Attending: EMERGENCY MEDICINE | Admitting: INTERNAL MEDICINE

## 2025-08-09 DIAGNOSIS — F41.9 ANXIETY: ICD-10-CM

## 2025-08-09 DIAGNOSIS — G40.909 SEIZURE DISORDER (HCC): ICD-10-CM

## 2025-08-09 DIAGNOSIS — R56.9 CONVULSIONS, UNSPECIFIED CONVULSION TYPE (HCC): ICD-10-CM

## 2025-08-09 DIAGNOSIS — R41.0 CONFUSION: ICD-10-CM

## 2025-08-09 DIAGNOSIS — S09.90XA CLOSED HEAD INJURY, INITIAL ENCOUNTER: ICD-10-CM

## 2025-08-09 DIAGNOSIS — N17.9 AKI (ACUTE KIDNEY INJURY): Primary | ICD-10-CM

## 2025-08-09 LAB
ANION GAP SERPL CALC-SCNC: 13 MMOL/L (ref 0–18)
BASOPHILS # BLD AUTO: 0.05 X10(3) UL (ref 0–0.2)
BASOPHILS NFR BLD AUTO: 0.5 %
BILIRUB UR QL STRIP.AUTO: NEGATIVE
BUN BLD-MCNC: 16 MG/DL (ref 9–23)
CALCIUM BLD-MCNC: 9.5 MG/DL (ref 8.7–10.6)
CHLORIDE SERPL-SCNC: 101 MMOL/L (ref 98–112)
CO2 SERPL-SCNC: 20 MMOL/L (ref 21–32)
CREAT BLD-MCNC: 1.45 MG/DL (ref 0.55–1.02)
EGFRCR SERPLBLD CKD-EPI 2021: 36 ML/MIN/1.73M2 (ref 60–?)
EOSINOPHIL # BLD AUTO: 0.13 X10(3) UL (ref 0–0.7)
EOSINOPHIL NFR BLD AUTO: 1.4 %
ERYTHROCYTE [DISTWIDTH] IN BLOOD BY AUTOMATED COUNT: 12.6 %
GLUCOSE BLD-MCNC: 108 MG/DL (ref 70–99)
GLUCOSE UR STRIP.AUTO-MCNC: NORMAL MG/DL
HCT VFR BLD AUTO: 31.7 % (ref 35–48)
HGB BLD-MCNC: 11 G/DL (ref 12–16)
IMM GRANULOCYTES # BLD AUTO: 0.05 X10(3) UL (ref 0–1)
IMM GRANULOCYTES NFR BLD: 0.5 %
KETONES UR STRIP.AUTO-MCNC: NEGATIVE MG/DL
LEUKOCYTE ESTERASE UR QL STRIP.AUTO: 500
LYMPHOCYTES # BLD AUTO: 1.53 X10(3) UL (ref 1–4)
LYMPHOCYTES NFR BLD AUTO: 16.3 %
MCH RBC QN AUTO: 32.4 PG (ref 26–34)
MCHC RBC AUTO-ENTMCNC: 34.7 G/DL (ref 31–37)
MCV RBC AUTO: 93.2 FL (ref 80–100)
MONOCYTES # BLD AUTO: 1.39 X10(3) UL (ref 0.1–1)
MONOCYTES NFR BLD AUTO: 14.8 %
NEUTROPHILS # BLD AUTO: 6.24 X10 (3) UL (ref 1.5–7.7)
NEUTROPHILS # BLD AUTO: 6.24 X10(3) UL (ref 1.5–7.7)
NEUTROPHILS NFR BLD AUTO: 66.5 %
NITRITE UR QL STRIP.AUTO: NEGATIVE
OSMOLALITY SERPL CALC.SUM OF ELEC: 280 MOSM/KG (ref 275–295)
PH UR STRIP.AUTO: 6 (ref 5–8)
PLATELET # BLD AUTO: 242 10(3)UL (ref 150–450)
POTASSIUM SERPL-SCNC: 4.5 MMOL/L (ref 3.5–5.1)
PROT UR STRIP.AUTO-MCNC: 30 MG/DL
RBC # BLD AUTO: 3.4 X10(6)UL (ref 3.8–5.3)
RBC #/AREA URNS AUTO: >10 /HPF
SODIUM SERPL-SCNC: 134 MMOL/L (ref 136–145)
SP GR UR STRIP.AUTO: 1.01 (ref 1–1.03)
UROBILINOGEN UR STRIP.AUTO-MCNC: NORMAL MG/DL
WBC # BLD AUTO: 9.4 X10(3) UL (ref 4–11)
WBC #/AREA URNS AUTO: >50 /HPF

## 2025-08-09 PROCEDURE — 99285 EMERGENCY DEPT VISIT HI MDM: CPT

## 2025-08-09 PROCEDURE — 0HQ0XZZ REPAIR SCALP SKIN, EXTERNAL APPROACH: ICD-10-PCS | Performed by: EMERGENCY MEDICINE

## 2025-08-09 PROCEDURE — 81001 URINALYSIS AUTO W/SCOPE: CPT | Performed by: EMERGENCY MEDICINE

## 2025-08-09 PROCEDURE — 87077 CULTURE AEROBIC IDENTIFY: CPT | Performed by: EMERGENCY MEDICINE

## 2025-08-09 PROCEDURE — 87086 URINE CULTURE/COLONY COUNT: CPT | Performed by: EMERGENCY MEDICINE

## 2025-08-09 PROCEDURE — 80048 BASIC METABOLIC PNL TOTAL CA: CPT | Performed by: EMERGENCY MEDICINE

## 2025-08-09 PROCEDURE — 85025 COMPLETE CBC W/AUTO DIFF WBC: CPT | Performed by: EMERGENCY MEDICINE

## 2025-08-09 PROCEDURE — 12001 RPR S/N/AX/GEN/TRNK 2.5CM/<: CPT

## 2025-08-09 PROCEDURE — 72125 CT NECK SPINE W/O DYE: CPT | Performed by: EMERGENCY MEDICINE

## 2025-08-09 PROCEDURE — 93005 ELECTROCARDIOGRAM TRACING: CPT

## 2025-08-09 PROCEDURE — 93010 ELECTROCARDIOGRAM REPORT: CPT

## 2025-08-09 PROCEDURE — 87186 SC STD MICRODIL/AGAR DIL: CPT | Performed by: EMERGENCY MEDICINE

## 2025-08-09 PROCEDURE — 70450 CT HEAD/BRAIN W/O DYE: CPT | Performed by: EMERGENCY MEDICINE

## 2025-08-09 PROCEDURE — 96360 HYDRATION IV INFUSION INIT: CPT

## 2025-08-09 RX ORDER — METOPROLOL SUCCINATE 25 MG/1
25 TABLET, EXTENDED RELEASE ORAL
Status: DISCONTINUED | OUTPATIENT
Start: 2025-08-10 | End: 2025-08-11

## 2025-08-09 RX ORDER — SENNOSIDES 8.6 MG
8.6 TABLET ORAL NIGHTLY
COMMUNITY
Start: 2025-07-30

## 2025-08-09 RX ORDER — LEVOTHYROXINE SODIUM 150 UG/1
150 TABLET ORAL
Status: DISCONTINUED | OUTPATIENT
Start: 2025-08-10 | End: 2025-08-14

## 2025-08-09 RX ORDER — MEMANTINE HYDROCHLORIDE 5 MG/1
5 TABLET ORAL 2 TIMES DAILY
COMMUNITY
Start: 2025-08-02

## 2025-08-09 RX ORDER — PHENYTOIN SODIUM 100 MG/1
200 CAPSULE, EXTENDED RELEASE ORAL NIGHTLY
Status: DISCONTINUED | OUTPATIENT
Start: 2025-08-10 | End: 2025-08-14

## 2025-08-09 RX ORDER — ENOXAPARIN SODIUM 100 MG/ML
30 INJECTION SUBCUTANEOUS DAILY
Status: DISCONTINUED | OUTPATIENT
Start: 2025-08-10 | End: 2025-08-14

## 2025-08-09 RX ORDER — FAMOTIDINE 20 MG/1
20 TABLET, FILM COATED ORAL DAILY
Status: DISCONTINUED | OUTPATIENT
Start: 2025-08-10 | End: 2025-08-14

## 2025-08-09 RX ORDER — LACOSAMIDE 50 MG/1
100 TABLET ORAL 2 TIMES DAILY
Status: DISCONTINUED | OUTPATIENT
Start: 2025-08-10 | End: 2025-08-14

## 2025-08-09 RX ORDER — POLYETHYLENE GLYCOL 3350 17 G/17G
17 POWDER, FOR SOLUTION ORAL DAILY
Status: DISCONTINUED | OUTPATIENT
Start: 2025-08-10 | End: 2025-08-14

## 2025-08-09 RX ORDER — ASCORBIC ACID 500 MG
500 TABLET ORAL DAILY
Status: DISCONTINUED | OUTPATIENT
Start: 2025-08-10 | End: 2025-08-14

## 2025-08-09 RX ORDER — POLYETHYLENE GLYCOL 3350 17 G/17G
17 POWDER, FOR SOLUTION ORAL DAILY PRN
Status: DISCONTINUED | OUTPATIENT
Start: 2025-08-09 | End: 2025-08-14

## 2025-08-09 RX ORDER — PIMECROLIMUS 10 MG/G
1 CREAM TOPICAL 2 TIMES DAILY PRN
COMMUNITY
Start: 2025-06-30 | End: 2025-08-14

## 2025-08-09 RX ORDER — ACETAMINOPHEN 500 MG
500 TABLET ORAL EVERY 6 HOURS PRN
Status: DISCONTINUED | OUTPATIENT
Start: 2025-08-09 | End: 2025-08-14

## 2025-08-09 RX ORDER — MIRTAZAPINE 7.5 MG/1
7.5 TABLET, FILM COATED ORAL NIGHTLY
COMMUNITY
Start: 2025-07-30 | End: 2025-08-14

## 2025-08-09 RX ORDER — PHENYTOIN 50 MG/1
50 TABLET, CHEWABLE ORAL DAILY
COMMUNITY
Start: 2025-08-06 | End: 2025-08-14

## 2025-08-09 RX ORDER — POLYVINYL ALCOHOL 14 MG/ML
1 SOLUTION/ DROPS OPHTHALMIC 4 TIMES DAILY PRN
Status: DISCONTINUED | OUTPATIENT
Start: 2025-08-09 | End: 2025-08-14

## 2025-08-09 RX ORDER — ASPIRIN 81 MG/1
81 TABLET, COATED ORAL DAILY
COMMUNITY
Start: 2025-07-30

## 2025-08-09 RX ORDER — ATORVASTATIN CALCIUM 20 MG/1
20 TABLET, FILM COATED ORAL NIGHTLY
Status: DISCONTINUED | OUTPATIENT
Start: 2025-08-09 | End: 2025-08-14

## 2025-08-09 RX ORDER — SODIUM CHLORIDE 9 MG/ML
1000 INJECTION, SOLUTION INTRAVENOUS ONCE
Status: COMPLETED | OUTPATIENT
Start: 2025-08-09 | End: 2025-08-10

## 2025-08-09 RX ORDER — PHENYTOIN SODIUM 100 MG/1
100 CAPSULE, EXTENDED RELEASE ORAL DAILY
Status: DISCONTINUED | OUTPATIENT
Start: 2025-08-10 | End: 2025-08-14

## 2025-08-09 RX ORDER — CLONAZEPAM 1 MG/1
1 TABLET ORAL NIGHTLY
Status: DISCONTINUED | OUTPATIENT
Start: 2025-08-10 | End: 2025-08-14

## 2025-08-09 RX ORDER — ESTRADIOL 0.1 MG/G
1 CREAM VAGINAL DAILY PRN
COMMUNITY
Start: 2025-07-22

## 2025-08-09 RX ORDER — SENNOSIDES 8.6 MG
17.2 TABLET ORAL NIGHTLY PRN
Status: DISCONTINUED | OUTPATIENT
Start: 2025-08-09 | End: 2025-08-14

## 2025-08-09 RX ORDER — BISACODYL 10 MG
10 SUPPOSITORY, RECTAL RECTAL
Status: DISCONTINUED | OUTPATIENT
Start: 2025-08-09 | End: 2025-08-14

## 2025-08-09 RX ORDER — ENOXAPARIN SODIUM 100 MG/ML
40 INJECTION SUBCUTANEOUS DAILY
Status: DISCONTINUED | OUTPATIENT
Start: 2025-08-10 | End: 2025-08-09

## 2025-08-10 LAB
ANION GAP SERPL CALC-SCNC: 11 MMOL/L (ref 0–18)
ATRIAL RATE: 101 BPM
BASOPHILS # BLD AUTO: 0.03 X10(3) UL (ref 0–0.2)
BASOPHILS NFR BLD AUTO: 0.5 %
BUN BLD-MCNC: 17 MG/DL (ref 9–23)
CALCIUM BLD-MCNC: 8.5 MG/DL (ref 8.7–10.6)
CHLORIDE SERPL-SCNC: 105 MMOL/L (ref 98–112)
CO2 SERPL-SCNC: 22 MMOL/L (ref 21–32)
CREAT BLD-MCNC: 1.27 MG/DL (ref 0.55–1.02)
EGFRCR SERPLBLD CKD-EPI 2021: 42 ML/MIN/1.73M2 (ref 60–?)
EOSINOPHIL # BLD AUTO: 0.1 X10(3) UL (ref 0–0.7)
EOSINOPHIL NFR BLD AUTO: 1.7 %
ERYTHROCYTE [DISTWIDTH] IN BLOOD BY AUTOMATED COUNT: 12.7 %
GLUCOSE BLD-MCNC: 101 MG/DL (ref 70–99)
HCT VFR BLD AUTO: 26.6 % (ref 35–48)
HGB BLD-MCNC: 9.2 G/DL (ref 12–16)
IMM GRANULOCYTES # BLD AUTO: 0.02 X10(3) UL (ref 0–1)
IMM GRANULOCYTES NFR BLD: 0.3 %
LYMPHOCYTES # BLD AUTO: 1.02 X10(3) UL (ref 1–4)
LYMPHOCYTES NFR BLD AUTO: 17.7 %
MCH RBC QN AUTO: 33 PG (ref 26–34)
MCHC RBC AUTO-ENTMCNC: 34.6 G/DL (ref 31–37)
MCV RBC AUTO: 95.3 FL (ref 80–100)
MONOCYTES # BLD AUTO: 0.96 X10(3) UL (ref 0.1–1)
MONOCYTES NFR BLD AUTO: 16.7 %
NEUTROPHILS # BLD AUTO: 3.63 X10 (3) UL (ref 1.5–7.7)
NEUTROPHILS # BLD AUTO: 3.63 X10(3) UL (ref 1.5–7.7)
NEUTROPHILS NFR BLD AUTO: 63.1 %
OSMOLALITY SERPL CALC.SUM OF ELEC: 288 MOSM/KG (ref 275–295)
P AXIS: 53 DEGREES
P-R INTERVAL: 134 MS
PLATELET # BLD AUTO: 199 10(3)UL (ref 150–450)
POTASSIUM SERPL-SCNC: 3.8 MMOL/L (ref 3.5–5.1)
Q-T INTERVAL: 330 MS
QRS DURATION: 84 MS
QTC CALCULATION (BEZET): 427 MS
R AXIS: -3 DEGREES
RBC # BLD AUTO: 2.79 X10(6)UL (ref 3.8–5.3)
SODIUM SERPL-SCNC: 138 MMOL/L (ref 136–145)
T AXIS: 53 DEGREES
VENTRICULAR RATE: 101 BPM
WBC # BLD AUTO: 5.8 X10(3) UL (ref 4–11)

## 2025-08-10 PROCEDURE — 85025 COMPLETE CBC W/AUTO DIFF WBC: CPT | Performed by: INTERNAL MEDICINE

## 2025-08-10 PROCEDURE — 82272 OCCULT BLD FECES 1-3 TESTS: CPT | Performed by: INTERNAL MEDICINE

## 2025-08-10 PROCEDURE — 80048 BASIC METABOLIC PNL TOTAL CA: CPT | Performed by: INTERNAL MEDICINE

## 2025-08-10 RX ORDER — PHENYTOIN 50 MG/1
50 TABLET, CHEWABLE ORAL DAILY
Status: DISCONTINUED | OUTPATIENT
Start: 2025-08-10 | End: 2025-08-14

## 2025-08-10 RX ORDER — POTASSIUM CHLORIDE 1.5 G/1.58G
40 POWDER, FOR SOLUTION ORAL ONCE
Status: COMPLETED | OUTPATIENT
Start: 2025-08-10 | End: 2025-08-10

## 2025-08-11 ENCOUNTER — TELEPHONE (OUTPATIENT)
Dept: UROLOGY | Facility: CLINIC | Age: 85
End: 2025-08-11

## 2025-08-11 LAB
ANION GAP SERPL CALC-SCNC: 12 MMOL/L (ref 0–18)
BASOPHILS # BLD AUTO: 0.04 X10(3) UL (ref 0–0.2)
BASOPHILS NFR BLD AUTO: 0.6 %
BUN BLD-MCNC: 14 MG/DL (ref 9–23)
CALCIUM BLD-MCNC: 8.9 MG/DL (ref 8.7–10.6)
CHLORIDE SERPL-SCNC: 104 MMOL/L (ref 98–112)
CO2 SERPL-SCNC: 21 MMOL/L (ref 21–32)
CREAT BLD-MCNC: 1.25 MG/DL (ref 0.55–1.02)
DEPRECATED HBV CORE AB SER IA-ACNC: 95 NG/ML (ref 50–306)
EGFRCR SERPLBLD CKD-EPI 2021: 43 ML/MIN/1.73M2 (ref 60–?)
EOSINOPHIL # BLD AUTO: 0.25 X10(3) UL (ref 0–0.7)
EOSINOPHIL NFR BLD AUTO: 3.6 %
ERYTHROCYTE [DISTWIDTH] IN BLOOD BY AUTOMATED COUNT: 12.9 %
GLUCOSE BLD-MCNC: 100 MG/DL (ref 70–99)
HCT VFR BLD AUTO: 29 % (ref 35–48)
HGB BLD-MCNC: 9.8 G/DL (ref 12–16)
IMM GRANULOCYTES # BLD AUTO: 0.05 X10(3) UL (ref 0–1)
IMM GRANULOCYTES NFR BLD: 0.7 %
IRON SATN MFR SERPL: 4 % (ref 15–50)
IRON SERPL-MCNC: 10 UG/DL (ref 50–170)
LYMPHOCYTES # BLD AUTO: 0.84 X10(3) UL (ref 1–4)
LYMPHOCYTES NFR BLD AUTO: 12 %
MCH RBC QN AUTO: 32.5 PG (ref 26–34)
MCHC RBC AUTO-ENTMCNC: 33.8 G/DL (ref 31–37)
MCV RBC AUTO: 96 FL (ref 80–100)
MONOCYTES # BLD AUTO: 1.22 X10(3) UL (ref 0.1–1)
MONOCYTES NFR BLD AUTO: 17.4 %
NEUTROPHILS # BLD AUTO: 4.62 X10 (3) UL (ref 1.5–7.7)
NEUTROPHILS # BLD AUTO: 4.62 X10(3) UL (ref 1.5–7.7)
NEUTROPHILS NFR BLD AUTO: 65.7 %
OSMOLALITY SERPL CALC.SUM OF ELEC: 285 MOSM/KG (ref 275–295)
PHENYTOIN SERPL-MCNC: 12.1 UG/ML (ref 10–20)
PLATELET # BLD AUTO: 215 10(3)UL (ref 150–450)
POTASSIUM SERPL-SCNC: 4.1 MMOL/L (ref 3.5–5.1)
POTASSIUM SERPL-SCNC: 4.1 MMOL/L (ref 3.5–5.1)
RBC # BLD AUTO: 3.02 X10(6)UL (ref 3.8–5.3)
SODIUM SERPL-SCNC: 137 MMOL/L (ref 136–145)
TOTAL IRON BINDING CAPACITY: 230 UG/DL (ref 250–425)
TRANSFERRIN SERPL-MCNC: 169 MG/DL (ref 250–380)
WBC # BLD AUTO: 7 X10(3) UL (ref 4–11)

## 2025-08-11 PROCEDURE — 82728 ASSAY OF FERRITIN: CPT | Performed by: INTERNAL MEDICINE

## 2025-08-11 PROCEDURE — 80185 ASSAY OF PHENYTOIN TOTAL: CPT | Performed by: INTERNAL MEDICINE

## 2025-08-11 PROCEDURE — 83550 IRON BINDING TEST: CPT | Performed by: INTERNAL MEDICINE

## 2025-08-11 PROCEDURE — 97530 THERAPEUTIC ACTIVITIES: CPT

## 2025-08-11 PROCEDURE — 97535 SELF CARE MNGMENT TRAINING: CPT

## 2025-08-11 PROCEDURE — 80048 BASIC METABOLIC PNL TOTAL CA: CPT | Performed by: INTERNAL MEDICINE

## 2025-08-11 PROCEDURE — 85025 COMPLETE CBC W/AUTO DIFF WBC: CPT | Performed by: INTERNAL MEDICINE

## 2025-08-11 PROCEDURE — 97166 OT EVAL MOD COMPLEX 45 MIN: CPT

## 2025-08-11 PROCEDURE — 84132 ASSAY OF SERUM POTASSIUM: CPT | Performed by: INTERNAL MEDICINE

## 2025-08-11 PROCEDURE — 83540 ASSAY OF IRON: CPT | Performed by: INTERNAL MEDICINE

## 2025-08-11 PROCEDURE — 97161 PT EVAL LOW COMPLEX 20 MIN: CPT

## 2025-08-12 ENCOUNTER — APPOINTMENT (OUTPATIENT)
Dept: CT IMAGING | Facility: HOSPITAL | Age: 85
End: 2025-08-12
Attending: PHYSICIAN ASSISTANT

## 2025-08-12 ENCOUNTER — APPOINTMENT (OUTPATIENT)
Facility: HOSPITAL | Age: 85
End: 2025-08-12
Attending: INTERNAL MEDICINE

## 2025-08-12 ENCOUNTER — APPOINTMENT (OUTPATIENT)
Dept: ULTRASOUND IMAGING | Facility: HOSPITAL | Age: 85
End: 2025-08-12
Attending: INTERNAL MEDICINE

## 2025-08-12 LAB
ANION GAP SERPL CALC-SCNC: 13 MMOL/L (ref 0–18)
BASOPHILS # BLD AUTO: 0.04 X10(3) UL (ref 0–0.2)
BASOPHILS NFR BLD AUTO: 0.7 %
BUN BLD-MCNC: 13 MG/DL (ref 9–23)
CALCIUM BLD-MCNC: 8.7 MG/DL (ref 8.7–10.6)
CHLORIDE SERPL-SCNC: 103 MMOL/L (ref 98–112)
CO2 SERPL-SCNC: 21 MMOL/L (ref 21–32)
CREAT BLD-MCNC: 1.1 MG/DL (ref 0.55–1.02)
EGFRCR SERPLBLD CKD-EPI 2021: 50 ML/MIN/1.73M2 (ref 60–?)
EOSINOPHIL # BLD AUTO: 0.39 X10(3) UL (ref 0–0.7)
EOSINOPHIL NFR BLD AUTO: 6.5 %
ERYTHROCYTE [DISTWIDTH] IN BLOOD BY AUTOMATED COUNT: 12.8 %
GLUCOSE BLD-MCNC: 101 MG/DL (ref 70–99)
HCT VFR BLD AUTO: 30.1 % (ref 35–48)
HGB BLD-MCNC: 10.2 G/DL (ref 12–16)
IMM GRANULOCYTES # BLD AUTO: 0.05 X10(3) UL (ref 0–1)
IMM GRANULOCYTES NFR BLD: 0.8 %
LYMPHOCYTES # BLD AUTO: 0.89 X10(3) UL (ref 1–4)
LYMPHOCYTES NFR BLD AUTO: 14.7 %
MCH RBC QN AUTO: 33 PG (ref 26–34)
MCHC RBC AUTO-ENTMCNC: 33.9 G/DL (ref 31–37)
MCV RBC AUTO: 97.4 FL (ref 80–100)
MONOCYTES # BLD AUTO: 0.91 X10(3) UL (ref 0.1–1)
MONOCYTES NFR BLD AUTO: 15.1 %
NEUTROPHILS # BLD AUTO: 3.76 X10 (3) UL (ref 1.5–7.7)
NEUTROPHILS # BLD AUTO: 3.76 X10(3) UL (ref 1.5–7.7)
NEUTROPHILS NFR BLD AUTO: 62.2 %
OSMOLALITY SERPL CALC.SUM OF ELEC: 284 MOSM/KG (ref 275–295)
PLATELET # BLD AUTO: 204 10(3)UL (ref 150–450)
POTASSIUM SERPL-SCNC: 3.8 MMOL/L (ref 3.5–5.1)
RBC # BLD AUTO: 3.09 X10(6)UL (ref 3.8–5.3)
SODIUM SERPL-SCNC: 137 MMOL/L (ref 136–145)
WBC # BLD AUTO: 6 X10(3) UL (ref 4–11)

## 2025-08-12 PROCEDURE — 76770 US EXAM ABDO BACK WALL COMP: CPT | Performed by: INTERNAL MEDICINE

## 2025-08-12 PROCEDURE — 74176 CT ABD & PELVIS W/O CONTRAST: CPT | Performed by: PHYSICIAN ASSISTANT

## 2025-08-12 PROCEDURE — 85025 COMPLETE CBC W/AUTO DIFF WBC: CPT | Performed by: INTERNAL MEDICINE

## 2025-08-12 PROCEDURE — 80048 BASIC METABOLIC PNL TOTAL CA: CPT | Performed by: INTERNAL MEDICINE

## 2025-08-12 RX ORDER — CIPROFLOXACIN 500 MG/1
500 TABLET, FILM COATED ORAL
Status: DISCONTINUED | OUTPATIENT
Start: 2025-08-12 | End: 2025-08-14

## 2025-08-12 RX ORDER — POTASSIUM CHLORIDE 1500 MG/1
40 TABLET, EXTENDED RELEASE ORAL ONCE
Status: COMPLETED | OUTPATIENT
Start: 2025-08-12 | End: 2025-08-12

## 2025-08-13 LAB — POTASSIUM SERPL-SCNC: 4.1 MMOL/L (ref 3.5–5.1)

## 2025-08-13 PROCEDURE — 84132 ASSAY OF SERUM POTASSIUM: CPT | Performed by: INTERNAL MEDICINE

## 2025-08-13 PROCEDURE — 97116 GAIT TRAINING THERAPY: CPT

## 2025-08-13 RX ORDER — FLUDROCORTISONE ACETATE 0.1 MG/1
0.1 TABLET ORAL DAILY
Status: DISCONTINUED | OUTPATIENT
Start: 2025-08-13 | End: 2025-08-14

## 2025-08-14 VITALS
HEIGHT: 62 IN | WEIGHT: 157 LBS | DIASTOLIC BLOOD PRESSURE: 47 MMHG | BODY MASS INDEX: 28.89 KG/M2 | HEART RATE: 86 BPM | RESPIRATION RATE: 20 BRPM | SYSTOLIC BLOOD PRESSURE: 87 MMHG | TEMPERATURE: 98 F | OXYGEN SATURATION: 93 %

## 2025-08-14 RX ORDER — CLONAZEPAM 0.5 MG/1
0.5 TABLET ORAL NIGHTLY
Qty: 14 TABLET | Refills: 0 | Status: SHIPPED | OUTPATIENT
Start: 2025-08-14 | End: 2025-08-21

## 2025-08-14 RX ORDER — FLUTICASONE PROPIONATE 50 MCG
1 SPRAY, SUSPENSION (ML) NASAL 2 TIMES DAILY PRN
Qty: 16 G | Refills: 11 | Status: SHIPPED | OUTPATIENT
Start: 2025-08-14

## 2025-08-14 RX ORDER — CLONAZEPAM 1 MG/1
1 TABLET ORAL NIGHTLY
Qty: 14 TABLET | Refills: 4 | Status: SHIPPED | OUTPATIENT
Start: 2025-08-14 | End: 2025-08-14

## 2025-08-14 RX ORDER — CIPROFLOXACIN 500 MG/1
500 TABLET, FILM COATED ORAL 2 TIMES DAILY
Qty: 14 TABLET | Refills: 0 | Status: SHIPPED | OUTPATIENT
Start: 2025-08-14 | End: 2025-08-21

## 2025-08-24 NOTE — DISCHARGE SUMMARY
[facility]  Discharge Summary    Alysia Norman Patient Status:  Inpatient    1940 MRN [id number]   Location [facility] 0SW-A Attending No att. providers found   Hosp Day # 2 PCP Alondra Burgess MD     Date of Admission: 2025    Date of Discharge: 2025  3:27 PM    Admitting Diagnosis: Hypochloremia [E87.8]  Hyponatremia [E87.1]  GOPI (acute kidney injury) [N17.9]  Urinary tract infection with hematuria, site unspecified [N39.0, R31.9]    Discharge Diagnosis:   1. change in mental status likely related to dehydration and underlying dementia  Urinary tract infection ruled out  2.  Osteoarthritis of multiple joints  3.  Seizure disorder on Dilantin and lacosamide    Problem List[1]    Reason for Admission: Recurrent fall    Physical Exam: See progress note        Hospital Course: Alysia Norman is a(n) 85 year old female.  Was sent to emergency room from assisted living because of the recurrent fall.  She is feeling weaker and also having repeat UTI.  Her workup in the ER was unremarkable  She is mainly wheelchair-bound walks minimally at the facility  Her medical history is significant for dementia with cognitive dysfunction, hypertension, hyperlipidemia, hypothyroidism and history of seizures  Her workup was negative.  Her urine culture came back negative, antibiotic were discontinued her mental status improved with IV fluid    Disposition: Home    Discharge Condition: Stable    Discharge Medications:   Discharge Medication List as of 2025  2:49 PM        START taking these medications    Details   amoxicillin 500 MG Oral Cap Take 1 capsule (500 mg total) by mouth every 8 (eight) hours for 5 days., Normal, Disp-15 capsule, R-0      ascorbic acid 500 MG Oral Tab Take 1 tablet (500 mg total) by mouth in the morning., Normal, Disp-30 tablet, R-11      Cranberry-Vitamin C-Probiotic (AZO CRANBERRY) 250-30 MG Oral Tab Take 2 tablets by mouth daily., Normal, Disp-60 tablet, R-11            CONTINUE these medications which have NOT CHANGED    Details   ergocalciferol 1.25 MG (93164 UT) Oral Cap Take 1 capsule (50,000 Units total) by mouth once a week., Historical      betamethasone dipropionate 0.05 % External Cream Apply topically in the morning and before bedtime., Historical      Calcium Carbonate Antacid 1000 MG Oral Chew Tab Chew 1,200 mg by mouth daily., Historical      !! phenytoin  MG Oral Cap Take 1 capsule (100 mg total) by mouth in the morning., Historical      !! phenytoin  MG Oral Cap Take 2 capsules (200 mg total) by mouth at bedtime., Historical      metoprolol succinate ER 25 MG Oral Tablet 24 Hr Take 1 tablet (25 mg total) by mouth daily. Hold if SBP less than 100, Historical      polyvinyl alcohol 1.4 % Ophthalmic Solution Place 0.05 mL (1 drop total) into both eyes 4 (four) times daily as needed., Historical      acetaminophen 500 MG Oral Tab Take 1 tablet (500 mg total) by mouth every 6 (six) hours as needed for Pain., Historical      levothyroxine 150 MCG Oral Tab TAKE ONE TABLET BY MOUTH EVERY MORNING BEFORE BREAKFAST, Normal, Disp-14 tablet, R-11      GEMTESA 75 MG Oral Tab TAKE ONE TABLET BY MOUTH EVERY DAY, Normal, Disp-14 tablet, R-10      CLONAZEPAM 1 MG Oral Tab TAKE ONE TABLET BY MOUTH AT BEDTIME, Normal, Disp-14 tablet, R-4      FLUTICASONE PROPIONATE 50 MCG/ACT Nasal Suspension INSTILL ONE SPRAY INTO EACH NOSTRIL EVERY 12 HOURS, Normal, Disp-16 g, R-11      ATORVASTATIN 20 MG Oral Tab TAKE ONE TABLET BY MOUTH NIGHTLY, Normal, Disp-14 tablet, R-10      FAMOTIDINE 20 MG Oral Tab TAKE ONE TABLET BY MOUTH EVERY DAY, STOP PROTONIX, Normal, Disp-14 tablet, R-10      lacosamide (VIMPAT) 100 MG Oral Tab Take 1 tablet (100 mg total) by mouth 2 (two) times daily. At 0900 am and 2000, Normal, Disp-60 tablet, R-0      Omega-3 Fatty Acids (FISH OIL) 1200 MG Oral Cap Take 1,200 mg by mouth in the morning., Historical      Polyethylene Glycol 3350 (MIRALAX) 17 g Oral  Powd Pack 1 packet Monday, Wednesday and Friday, Normal, Disp-30 each, R-11      hydrocortisone (PROCTOSOL HC) 2.5 % External Cream Place 1 Application rectally 2 (two) times daily as needed (scalp)., Historical       !! - Potential duplicate medications found. Please discuss with provider.        STOP taking these medications       hydrocortisone 2.5 % External Ointment        lactulose 10 GM/15ML Oral Solution              Follow up Visits: Follow-up with Physicians as directed.    Total time spent with the patient exceeded 40 minutes, with more than half dedicated to counseling and coordination of care.    Alondra Burgess MD  8/24/2025  5:47 PM       [1]   Patient Active Problem List  Diagnosis    Seizure (HCC)    Recurrent seizures (HCC)    Hypothyroidism    Mixed hyperlipidemia    Essential hypertension    Convulsions (HCC)    Convulsions, unspecified convulsion type (HCC)    Dementia without behavioral disturbance (HCC)    Unsteady gait    Coronary artery disease involving native coronary artery of native heart without angina pectoris    Obesity due to excess calories    Scalp psoriasis    Bilateral leg edema    Vitamin D deficiency    Osteoarthritis of both knees    Psoriasis    Hearing loss of left ear    Mixed stress and urge incontinence    Prediabetes    Partial symptomatic epilepsy with complex partial seizures, intractable, without status epilepticus (HCC)    OAB (overactive bladder)    Chronic constipation    Peripheral vascular disease, unspecified    Gait disturbance    Syncope, near    Orthostasis    Mild cognitive impairment    Non-seasonal allergic rhinitis    Mixed conductive and sensorineural hearing loss of both ears    Speech disturbance, unspecified type    Seizures (HCC)    Dementia (HCC)    Hearing loss associated with syndrome of both ears    Eczema    Pedal edema    Hyponatremia    SIADH (syndrome of inappropriate ADH production) (HCC)    GOPI (acute kidney injury)    Urinary tract infection  with hematuria, site unspecified    Hypochloremia    Closed head injury, initial encounter    Confusion

## (undated) NOTE — IP AVS SNAPSHOT
[address]            (For Outpatient Use Only) Initial Admit Date: 6/19/2018   Inpt/Obs Admit Date: Inpt: 6/19/18 / Obs: 06/19/18   Discharge Date:    Fili Stone:  [de-identified]   MRN: [de-identified]   CSN: [id number]        [facility] Subscriber Name:  Lulu Ghosh :    Subscriber ID:  Pt Rel to Subscriber:    Hospital Account Financial Class: Medicare    2018

## (undated) NOTE — IP AVS SNAPSHOT
[address]            (For Outpatient Use Only) Initial Admit Date: 10/19/2022   Inpt/Obs Admit Date: Inpt: 10/21/22 / Obs: 10/19/22   Discharge Date:    Hospital Acct:  [de-identified]   MRN: [de-identified]   CSN: [id number]   CEID: [id number]        ENCOUNTER  Patient Class: Inpatient Admitting Provider: Kelly Eason MD Unit: [address] Service: Medical Attending Provider: Kelly Eason MD   Bed: 3601-A   Visit Type:   Referring Physician: No ref. provider found Billing Flag:    Admit Diagnosis: Orthostasis [I95.1]      PATIENT  Legal Name:   Merlin Martinez   Legal Sex: Female  Gender ID:              [address] [facility] Name:    PCP:  Zan Valdez MD Home: [phone]   Address:  [address] [place] [address] : 1940 (82 yrs) Mobile: [phone]         City/State/Zip: [address] [place] [address] Marital:  Language: Anne park: Julia SSN4: [id number] Restorationist: [address] Not Duaine Canavan*     Race: White Ethnicity: Non  Or [address] [place] [address]   Name Relationship Legal Guardian? Home Phone Work Phone Mobile Phone   1. Kristina Torrez  2.  [facility] Daughter  Daughter    [phone]     GUARANTOR  Guarantor: Lobo Vasquez : 1940 Home Phone: [phone]   Address: [address] [place] [address]  Sex: Female Work Phone:    City/State/Zip: [address] [place] [address]   Rel. to Patient: Self Guarantor ID: [id number]   GUARANTOR EMPLOYER   Employer:  Status: RETIRED     COVERAGE  PRIMARY INSURANCE   Payor: MEDICARE Plan: MEDICARE PART A&B   Group Number:  Insurance Type: INDEMNITY   Subscriber Name: Richardson Iraheta : 1940   Subscriber ID: [id number] Pt Rel to Subscriber: Self   SECONDARY INSURANCE   Payor: COMMERCIAL Plan: [facility]   Group Number:  Insurance Type: INDEMNITY   Subscriber Name: Richardson Iraheta : 1940   Subscriber ID: [id number] Pt Rel to Subscriber: SELF   TERTIARY INSURANCE   Payor: MEDICAID Plan: MEDICAID   Group Number:  Insurance Type: INDEMNITY   Subscriber Name: Alpesh Vidal : 1940   Subscriber ID: [id number] Pt Rel to Subscriber: [facility][1] Account Financial Class: Medicare    2022

## (undated) NOTE — ED AVS SNAPSHOT
Laurita Resendiz   MRN: [id number]    Department:  [facility] Emergency Department   Date of Visit:  9/8/2018           Disclosure     Insurance plans vary and the physician(s) referred by the ER may not be covered by your plan.  Please contact your tell this physician (or your personal doctor if your instructions are to return to your personal doctor) about any new or lasting problems. The primary care or specialist physician will see patients referred from the [facility] Emergency Department.  Alma Ortiz

## (undated) NOTE — LETTER
November 21, 2018          Elsa Cohen  [address]          Dear Livier Espinosa:  the tests show no significant abnormalities         Dr. Kelsi Salazar

## (undated) NOTE — Clinical Note
Spoke with pt for Transitions of care call after emergency room visit . Patient does not have a scheduled appointment. Thank you!

## (undated) NOTE — IP AVS SNAPSHOT
Patient Demographics     Address  [address] [place] [address] Phone  [phone] (Home) *Preferred*      Emergency Contact(s)     Name Relation Home Work Mary Daughter [phone]      [facility] Daughter [phone] 312-2 Take 25 mg by mouth every 6 (six) hours as needed for Itching. DRISDOL 02986 units Caps  Generic drug:  ergocalciferol      Take 50,000 Units by mouth once a week.           Fluticasone Propionate 50 MCG/ACT Susp  Commonly known as:  FLONASE Phenytoin Sodium Extended 100 MG Caps  Commonly known as:  DILANTIN      Take 100 mg by mouth every other day. Takes Phenytoin  mg capsule with Phenytoin 50 mg chewable tablet to equal dose of Phenytoin 150 mg every other morning.  Alternating with Ph [id number] metoprolol Tartrate (LOPRESSOR) tab 25 mg 06/21/18 0603 Given      [id number] phenytoin (DILANTIN INFATABS) tab 50 mg (And Linked Group #1) 06/21/18 0836 Given                    Recent Vital Signs    Flowsheet Row Most Recent Value   Vitals  126 History of Present Illness: The patient is a[BR.3 70year old[BR.2] female for whom a neurology consultation has been requested because of uncontrolled seizures. The patient is right-handed.   She is under the care of Dr. Gordy Carlson  I reviewed the electro Current Facility-Administered Medications:  lacosamide (VIMPAT) solution SOLN 100 mg 100 mg Oral BID   aspirin chewable tab 81 mg 81 mg Oral Daily   atorvastatin (LIPITOR) tab 20 mg 20 mg Oral Nightly   bumetanide (BUMEX) tab 0.5 mg 0.5 mg Oral Daily   Leeanne phenytoin 50 MG Oral Chew Tab Chew 1 tablet (50 mg total) by mouth every other day. Phenytoin Sodium Extended 100 MG Oral Cap Take 1 capsule (100 mg total) by mouth every other day.    Phenytoin Sodium Extended 200 MG Oral Cap Take 1 capsule (200 mg total Neck: supple, normal range of motion, no cervical bruit  Lymph Nodes:  No adenopathy  Cardiovascular: S1, S2, no murmur. Regular rate and rhythm.   Lungs: Clear to auscultation  Skin: No evidence for rash    NEUROLOGICAL EXAMINATION  Mental status exam: Th Seizure disorder (HCC)  Phenytoin and  lacosamide will be continued. EEG testing to follow. MRI brain looking for focal source of seizures.       Oxygen desaturation  PA and lateral chest x-ray, further care per the hospitalist team      Diminished vibr Reason for Therapy:  ADL/IADL Dysfunction and Discharge Planning    History related to current admission: Pt was admitted on 6/19 with possible seizure. Brain MRI pending. History of seizure 2003, dementia, HTN.       Problem List  Principal Problem:    Se -   Toileting, which includes using toilet, bedpan or urinal? : None  -   Putting on and taking off regular upper body clothing?: None  -   Taking care of personal grooming such as brushing teeth?: None  -   Eating meals?: None    AM-PAC Score:  Score: 24 Patient able to toilet transfer: safely and independently  Patient able to dress lower extremities: at previous functional level  Patient/Caregiver able to demonstrate safety with ADLS: safely and independently[MS. 1]     Attribution Key    MS. 1 - Maryana Li